# Patient Record
Sex: MALE | Race: BLACK OR AFRICAN AMERICAN | NOT HISPANIC OR LATINO | Employment: FULL TIME | URBAN - METROPOLITAN AREA
[De-identification: names, ages, dates, MRNs, and addresses within clinical notes are randomized per-mention and may not be internally consistent; named-entity substitution may affect disease eponyms.]

---

## 2017-04-18 ENCOUNTER — ALLSCRIPTS OFFICE VISIT (OUTPATIENT)
Dept: OTHER | Facility: OTHER | Age: 37
End: 2017-04-18

## 2017-04-18 DIAGNOSIS — I10 ESSENTIAL (PRIMARY) HYPERTENSION: ICD-10-CM

## 2017-04-18 DIAGNOSIS — E66.9 OBESITY: ICD-10-CM

## 2017-08-12 ENCOUNTER — APPOINTMENT (OUTPATIENT)
Dept: LAB | Facility: CLINIC | Age: 37
End: 2017-08-12
Payer: COMMERCIAL

## 2017-08-12 ENCOUNTER — TRANSCRIBE ORDERS (OUTPATIENT)
Dept: LAB | Facility: CLINIC | Age: 37
End: 2017-08-12

## 2017-08-12 DIAGNOSIS — E66.9 OBESITY: ICD-10-CM

## 2017-08-12 DIAGNOSIS — I10 ESSENTIAL (PRIMARY) HYPERTENSION: ICD-10-CM

## 2017-08-12 LAB
ALBUMIN SERPL BCP-MCNC: 3.5 G/DL (ref 3.5–5)
ALP SERPL-CCNC: 71 U/L (ref 46–116)
ALT SERPL W P-5'-P-CCNC: 37 U/L (ref 12–78)
ANION GAP SERPL CALCULATED.3IONS-SCNC: 7 MMOL/L (ref 4–13)
AST SERPL W P-5'-P-CCNC: 24 U/L (ref 5–45)
BILIRUB SERPL-MCNC: 0.6 MG/DL (ref 0.2–1)
BUN SERPL-MCNC: 15 MG/DL (ref 5–25)
CALCIUM SERPL-MCNC: 9 MG/DL (ref 8.3–10.1)
CHLORIDE SERPL-SCNC: 99 MMOL/L (ref 100–108)
CHOLEST SERPL-MCNC: 166 MG/DL (ref 50–200)
CO2 SERPL-SCNC: 31 MMOL/L (ref 21–32)
CREAT SERPL-MCNC: 1.36 MG/DL (ref 0.6–1.3)
EST. AVERAGE GLUCOSE BLD GHB EST-MCNC: 126 MG/DL
GFR SERPL CREATININE-BSD FRML MDRD: 76 ML/MIN/1.73SQ M
GLUCOSE P FAST SERPL-MCNC: 95 MG/DL (ref 65–99)
HBA1C MFR BLD: 6 % (ref 4.2–6.3)
HDLC SERPL-MCNC: 38 MG/DL (ref 40–60)
LDLC SERPL CALC-MCNC: 107 MG/DL (ref 0–100)
POTASSIUM SERPL-SCNC: 3.8 MMOL/L (ref 3.5–5.3)
PROT SERPL-MCNC: 8.1 G/DL (ref 6.4–8.2)
SODIUM SERPL-SCNC: 137 MMOL/L (ref 136–145)
TRIGL SERPL-MCNC: 107 MG/DL
TSH SERPL DL<=0.05 MIU/L-ACNC: 1.35 UIU/ML (ref 0.36–3.74)

## 2017-08-12 PROCEDURE — 84443 ASSAY THYROID STIM HORMONE: CPT

## 2017-08-12 PROCEDURE — 83036 HEMOGLOBIN GLYCOSYLATED A1C: CPT

## 2017-08-12 PROCEDURE — 80053 COMPREHEN METABOLIC PANEL: CPT

## 2017-08-12 PROCEDURE — 80061 LIPID PANEL: CPT

## 2017-08-12 PROCEDURE — 36415 COLL VENOUS BLD VENIPUNCTURE: CPT
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
## 2018-01-13 NOTE — RESULT NOTES
Verified Results  (1) COMPREHENSIVE METABOLIC PANEL [id number] 55:56RM [employer]     Test Name Result Flag Reference   GLUCOSE 87 mg/dL  65-99   Fasting reference interval   UREA NITROGEN (BUN) 13 mg/dL  7-25   CREATININE 1 14 mg/dL  0 60-1 35   eGFR NON-AFR   AMERICAN 82 mL/min/1 73m2  > OR = 60   eGFR AFRICAN AMERICAN 95 mL/min/1 73m2  > OR = 60   BUN/CREATININE RATIO   2-01   NOT APPLICABLE (calc)   SODIUM 139 mmol/L  135-146   POTASSIUM 4 0 mmol/L  3 5-5 3   CHLORIDE 102 mmol/L     CARBON DIOXIDE 30 mmol/L  19-30   CALCIUM 9 5 mg/dL  8 6-10 3   PROTEIN, TOTAL 7 6 g/dL  6 1-8 1   ALBUMIN 4 2 g/dL  3 6-5 1   GLOBULIN 3 4 g/dL (calc)  1 9-3 7   ALBUMIN/GLOBULIN RATIO 1 2 (calc)  1 0-2 5   BILIRUBIN, TOTAL 0 8 mg/dL  0 2-1 2   ALKALINE PHOSPHATASE 61 U/L     AST 17 U/L  10-40   ALT 17 U/L  9-46     (1) CBC/PLT/DIFF [id number] 10:44AM [employer]     Test Name Result Flag Reference   WHITE BLOOD CELL COUNT 7 7 Thousand/uL  3 8-10 8   RED BLOOD CELL COUNT 5 47 Million/uL  4 20-5 80   HEMOGLOBIN 14 9 g/dL  13 2-17 1   HEMATOCRIT 46 6 %  38 5-50 0   MCV 85 2 fL  80 0-100 0   MCH 27 3 pg  27 0-33 0   MCHC 32 0 g/dL  32 0-36 0   RDW 14 9 %  11 0-15 0   PLATELET COUNT 694 Thousand/uL  140-400   MPV 9 2 fL  7 5-11 5   ABSOLUTE NEUTROPHILS 4127 cells/uL  7833-6199   ABSOLUTE LYMPHOCYTES 3011 cells/uL  850-3900   ABSOLUTE MONOCYTES 424 cells/uL  200-950   ABSOLUTE EOSINOPHILS 100 cells/uL     ABSOLUTE BASOPHILS 39 cells/uL  0-200   NEUTROPHILS 53 6 %     LYMPHOCYTES 39 1 %     MONOCYTES 5 5 %     EOSINOPHILS 1 3 %     BASOPHILS 0 5 %       (Q) LIPID PANEL WITH REFLEX TO DIRECT LDL [id number] 10:44AM [employer]     Test Name Result Flag Reference   CHOLESTEROL, TOTAL 177 mg/dL  125-200   HDL CHOLESTEROL 43 mg/dL  > OR = 40   TRIGLICERIDES 205 mg/dL  <150   LDL-CHOLESTEROL 104 mg/dL (calc)  <130   Desirable range <100 mg/dL for patients with CHD or  diabetes and <70 mg/dL for diabetic patients with  known heart disease  CHOL/HDLC RATIO 4 1 (calc)  < OR = 5 0   NON HDL CHOLESTEROL 134 mg/dL (calc)     Target for non-HDL cholesterol is 30 mg/dL higher than   LDL cholesterol target       (Q) TSH, 3RD GENERATION W/REFLEX TO FT4 31RNY1367 10:44AM Monika Alex   REPORT COMMENT:  FASTING:YES     Test Name Result Flag Reference   TSH W/REFLEX TO FT4 1 50 mIU/L  0 40-4 50       Discussion/Summary   OVERALL OK      [facility]

## 2018-01-14 VITALS
DIASTOLIC BLOOD PRESSURE: 98 MMHG | RESPIRATION RATE: 18 BRPM | BODY MASS INDEX: 46.76 KG/M2 | WEIGHT: 315 LBS | HEART RATE: 72 BPM | SYSTOLIC BLOOD PRESSURE: 152 MMHG

## 2018-01-16 NOTE — RESULT NOTES
Verified Results  US ABDOMEN LIMITED [id number] 07:02AM Tyesha Kerrie Order Number: [id number]   Performing Comments: LUMP IN UPPER LEFT ABDOMEN- NEAR SCAR   - Patient Instructions: To schedule this appointment, please contact Central Scheduling at [phone]  Test Name Result Flag Reference   US ABDOMEN LIMITED (Report)     Left upper quadrant ULTRASOUND     INDICATION: Palpable abnormality in the left upper quadrant  COMPARISON: None  TECHNIQUE:  Real-time ultrasound of the left upper quadrant in the area of palpable abnormality was performed with a linear transducer with both volumetric sweeps and still imaging techniques  FINDINGS: Palpable abnormality corresponds to a subcutaneous echogenic structure measuring 1 mm with shadowing  IMPRESSION:     Palpable abnormality corresponds to a subcutaneous echogenic structure measuring 1 mm with shadowing  Nonspecific could represent a small foreign body versus dystrophic calcium  Clinical correlation recommended  Workstation performed: [id number]     Signed by: Magaly Jacob MD   2/15/16     * XR SPINE LUMBAR 2 OR 3 VIEWS [id number] 07:01AM Tyesha Cohens Order Number: [id number]     Test Name Result Flag Reference   XR SPINE LUMBAR 2 OR 3 VIEWS (Report)     LUMBAR SPINE     INDICATION: Left-sided low back pain     COMPARISON: None     VIEWS: AP, lateral and coned-down projections; 3 images     FINDINGS:     Alignment is unremarkable  There is no radiographic evidence of acute fracture or destructive osseous lesion  No significant lumbar degenerative change noted  Visualized soft tissues appear unremarkable  IMPRESSION:     No acute bony abnormality, lumbar spine         Workstation performed: [id number]     Signed by:   Burt Paulino MD   2/15/16

## 2018-02-02 DIAGNOSIS — K21.9 GASTROESOPHAGEAL REFLUX DISEASE WITHOUT ESOPHAGITIS: ICD-10-CM

## 2018-02-02 DIAGNOSIS — I10 ESSENTIAL HYPERTENSION: Primary | ICD-10-CM

## 2018-02-02 RX ORDER — LOSARTAN POTASSIUM AND HYDROCHLOROTHIAZIDE 25; 100 MG/1; MG/1
TABLET ORAL
Qty: 90 TABLET | Refills: 0 | Status: SHIPPED | OUTPATIENT
Start: 2018-02-02 | End: 2018-02-06 | Stop reason: SDUPTHER

## 2018-02-02 RX ORDER — OMEPRAZOLE 40 MG/1
CAPSULE, DELAYED RELEASE ORAL
Qty: 90 CAPSULE | Refills: 0 | Status: SHIPPED | OUTPATIENT
Start: 2018-02-02 | End: 2018-02-06 | Stop reason: SDUPTHER

## 2018-02-06 ENCOUNTER — OFFICE VISIT (OUTPATIENT)
Dept: URGENT CARE | Age: 38
End: 2018-02-06
Payer: COMMERCIAL

## 2018-02-06 VITALS
RESPIRATION RATE: 18 BRPM | BODY MASS INDEX: 42.66 KG/M2 | TEMPERATURE: 98.4 F | HEART RATE: 90 BPM | HEIGHT: 72 IN | DIASTOLIC BLOOD PRESSURE: 90 MMHG | WEIGHT: 315 LBS | SYSTOLIC BLOOD PRESSURE: 142 MMHG | OXYGEN SATURATION: 96 %

## 2018-02-06 DIAGNOSIS — J11.1 INFLUENZA-LIKE ILLNESS: Primary | ICD-10-CM

## 2018-02-06 PROCEDURE — 99203 OFFICE O/P NEW LOW 30 MIN: CPT | Performed by: FAMILY MEDICINE

## 2018-02-06 PROCEDURE — S9088 SERVICES PROVIDED IN URGENT: HCPCS | Performed by: FAMILY MEDICINE

## 2018-02-06 RX ORDER — OMEPRAZOLE 40 MG/1
1 CAPSULE, DELAYED RELEASE ORAL DAILY
COMMUNITY
Start: 2011-12-01 | End: 2018-04-24 | Stop reason: SDUPTHER

## 2018-02-06 RX ORDER — LOSARTAN POTASSIUM AND HYDROCHLOROTHIAZIDE 25; 100 MG/1; MG/1
1 TABLET ORAL DAILY
COMMUNITY
Start: 2012-09-20 | End: 2018-04-24 | Stop reason: SDUPTHER

## 2018-02-06 RX ORDER — OSELTAMIVIR PHOSPHATE 75 MG/1
75 CAPSULE ORAL EVERY 12 HOURS SCHEDULED
Qty: 10 CAPSULE | Refills: 0 | Status: SHIPPED | OUTPATIENT
Start: 2018-02-06 | End: 2018-02-11

## 2018-02-06 NOTE — LETTER
February 6, 2018     Patient: Diaz Champagne   YOB: 1980   Date of Visit: 2/6/2018       To Whom It May Concern:    Patient seen in office today for acute illness  Please excuse from work until flu like symptoms have resolved           Sincerely,        Siomara Jung PA-C    CC: No Recipients

## 2018-02-07 NOTE — PROGRESS NOTES
[facility]        NAME: Jennifer Weathers is a 44 y o  male  : 1980    MRN: [id number]  DATE: 2018  TIME: 8:01 PM    Assessment and Plan   Influenza-like illness [R69]  1  Influenza-like illness  oseltamivir (TAMIFLU) 75 mg capsule         Patient Instructions     Patient Instructions     Influenza, Ambulatory Care   GENERAL INFORMATION:   Influenza (the flu) is an infection caused by the influenza virus  The flu is easily spread when an infected person coughs, sneezes, or has close contact with others  You may be able to spread the flu to others for 1 week or longer after signs or symptoms appear  Common symptoms include the following:   · Fever and chills    · Headaches, body aches, and muscle or joint pain    · Cough, runny nose, and sore throat    · Loss of appetite, nausea, vomiting, or diarrhea    · Tiredness    · Trouble breathing  Seek immediate care for the following symptoms:   · Dizziness    · Urinating less or not at all    · A seizure    · A headache, stiff neck, and confusion    · Trouble breathing with blue or purple lips    · New pain or pressure in your chest  Treatment for influenza  may include any of the following:  · Acetaminophen  decreases pain and fever  It is available without a doctor's order  Ask your healthcare provider how much to take and how often to take it  Follow directions  Acetaminophen can cause liver damage if not taken correctly  · NSAIDs  help decrease swelling and pain or fever  This medicine is available with or without a doctor's order  NSAIDs can cause stomach bleeding or kidney problems in certain people  If you take blood thinner medicine, always ask your healthcare provider if NSAIDs are safe for you  Always read the medicine label and follow directions  · Antivirals  are given to fight an infection caused by a virus  Manage your symptoms:   · Get more rest and sleep  to help you get better faster when you have the flu       · Drink more liquids as directed  Ask your healthcare provider how much liquid to drink each day and which liquids are best for you  Drinking liquids helps prevent dehydration  Prevent the spread of influenza:   · Wash your hands often  Use soap and water  Use gel hand cleanser when there is no soap and water available  Do not touch your eyes, nose, or mouth unless you have washed your hands first            · Cover your mouth and nose with a tissue when you sneeze or cough  Throw the tissue in the trash right away  · Clean shared items  Clean table surfaces, doorknobs, and light switches with a germ-killing   Do not share towels, silverware, or dishes with people who are sick  Wash bed sheets, towels, silverware, and dishes with soap and water  · Wear a face mask  over your mouth and nose if you have the flu or are near anyone who has the flu  Ask where to buy single-use masks  · Stay home if you are sick  Stay away from others as much as possible while you recover  · Get an influenza vaccine  to help prevent the flu  Everyone older than age 7 months should get a yearly influenza vaccine  Get the vaccine as soon as it is available, usually in October or November each year  Follow up with your healthcare provider as directed:  Write down your questions so you remember to ask them during your visits  CARE AGREEMENT:   You have the right to help plan your care  Learn about your health condition and how it may be treated  Discuss treatment options with your caregivers to decide what care you want to receive  You always have the right to refuse treatment  The above information is an  only  It is not intended as medical advice for individual conditions or treatments  Talk to your doctor, nurse or pharmacist before following any medical regimen to see if it is safe and effective for you    © 2014 [address] [employer] is for End User's use only and may not be sold, redistributed or otherwise used for commercial purposes  All illustrations and images included in CareNotes® are the copyrighted property of A D A M [employer] Inc  or [employer]  Chief Complaint     Chief Complaint   Patient presents with    Cold Like Symptoms     Yesterday - sore throat, congested cough, body aches and sl  nasal congestion  No Flu vaccine    Cough    Sore Throat         History of Present Illness   Dudley Duffy presents to the clinic c/o    39-year-old male that started feeling poorly yesterday  Sore throat, postnasal drainage and cough with slight nasal congestion  Body aches and pains across his shoulders and in his low back  No nausea vomiting or diarrhea  Took some Motrin this afternoon without much relief  Denies chest pain or shortness of breath at this time  Did not have flu vaccine this year  No history of asthma  Review of Systems   Review of Systems   Constitutional: Positive for activity change, appetite change and fatigue  Negative for chills and fever  HENT: Positive for rhinorrhea and sore throat  Negative for congestion, ear discharge, ear pain and postnasal drip  Eyes: Negative  Respiratory: Positive for cough  Negative for chest tightness, shortness of breath and wheezing  Cardiovascular: Negative  Gastrointestinal: Negative  Musculoskeletal: Positive for arthralgias and myalgias  Skin: Negative for rash  Neurological: Negative for dizziness  Hematological: Negative for adenopathy           Current Medications     Long-Term Prescriptions   Medication Sig Dispense Refill    losartan-hydrochlorothiazide (HYZAAR) 100-25 MG per tablet Take 1 tablet by mouth daily      omeprazole (PriLOSEC) 40 MG capsule Take 1 capsule by mouth daily      [DISCONTINUED] losartan-hydrochlorothiazide (HYZAAR) 100-25 MG per tablet TAKE 1 TABLET BY MOUTH EVERY MORNING 90 tablet 0    [DISCONTINUED] omeprazole (PriLOSEC) 40 MG capsule TAKE ONE CAPSULE BY MOUTH DAILY 90 capsule 0       Current Allergies     Allergies as of 02/06/2018 - Reviewed 02/06/2018   Allergen Reaction Noted    Ace inhibitors Cough 09/20/2012            The following portions of the patient's history were reviewed and updated as appropriate: allergies, current medications, past family history, past medical history, past social history, past surgical history and problem list     Objective   /90 (BP Location: Right arm, Patient Position: Sitting, Cuff Size: Large)   Pulse 90   Temp 98 4 °F (36 9 °C) (Oral)   Resp 18   Ht 6' (1 829 m)   Wt (!) 151 kg (333 lb 9 6 oz)   SpO2 96%   BMI 45 24 kg/m²        Physical Exam     Physical Exam   Constitutional: He is oriented to person, place, and time  He appears well-developed and well-nourished  No distress  HENT:   Right Ear: External ear normal    Left Ear: External ear normal    Mouth/Throat: No oropharyngeal exudate  Mild redness posterior pharynx without exudate or fetid breath   Eyes: Conjunctivae are normal  Pupils are equal, round, and reactive to light  Right eye exhibits no discharge  Left eye exhibits no discharge  No scleral icterus  Neck: Normal range of motion  Neck supple  Cardiovascular: Normal rate, regular rhythm and normal heart sounds  Exam reveals no gallop and no friction rub  No murmur heard  Pulmonary/Chest: Effort normal and breath sounds normal  No respiratory distress  He has no wheezes  He has no rales  Lymphadenopathy:     He has no cervical adenopathy  Neurological: He is alert and oriented to person, place, and time  Skin: Skin is warm and dry  No rash noted  Psychiatric: He has a normal mood and affect

## 2018-02-07 NOTE — PATIENT INSTRUCTIONS
Influenza, Ambulatory Care   GENERAL INFORMATION:   Influenza (the flu) is an infection caused by the influenza virus  The flu is easily spread when an infected person coughs, sneezes, or has close contact with others  You may be able to spread the flu to others for 1 week or longer after signs or symptoms appear  Common symptoms include the following:   · Fever and chills    · Headaches, body aches, and muscle or joint pain    · Cough, runny nose, and sore throat    · Loss of appetite, nausea, vomiting, or diarrhea    · Tiredness    · Trouble breathing  Seek immediate care for the following symptoms:   · Dizziness    · Urinating less or not at all    · A seizure    · A headache, stiff neck, and confusion    · Trouble breathing with blue or purple lips    · New pain or pressure in your chest  Treatment for influenza  may include any of the following:  · Acetaminophen  decreases pain and fever  It is available without a doctor's order  Ask your healthcare provider how much to take and how often to take it  Follow directions  Acetaminophen can cause liver damage if not taken correctly  · NSAIDs  help decrease swelling and pain or fever  This medicine is available with or without a doctor's order  NSAIDs can cause stomach bleeding or kidney problems in certain people  If you take blood thinner medicine, always ask your healthcare provider if NSAIDs are safe for you  Always read the medicine label and follow directions  · Antivirals  are given to fight an infection caused by a virus  Manage your symptoms:   · Get more rest and sleep  to help you get better faster when you have the flu  · Drink more liquids as directed  Ask your healthcare provider how much liquid to drink each day and which liquids are best for you  Drinking liquids helps prevent dehydration  Prevent the spread of influenza:   · Wash your hands often  Use soap and water  Use gel hand cleanser when there is no soap and water available   Do not touch your eyes, nose, or mouth unless you have washed your hands first            · Cover your mouth and nose with a tissue when you sneeze or cough  Throw the tissue in the trash right away  · Clean shared items  Clean table surfaces, doorknobs, and light switches with a germ-killing   Do not share towels, silverware, or dishes with people who are sick  Wash bed sheets, towels, silverware, and dishes with soap and water  · Wear a face mask  over your mouth and nose if you have the flu or are near anyone who has the flu  Ask where to buy single-use masks  · Stay home if you are sick  Stay away from others as much as possible while you recover  · Get an influenza vaccine  to help prevent the flu  Everyone older than age 7 months should get a yearly influenza vaccine  Get the vaccine as soon as it is available, usually in October or November each year  Follow up with your healthcare provider as directed:  Write down your questions so you remember to ask them during your visits  CARE AGREEMENT:   You have the right to help plan your care  Learn about your health condition and how it may be treated  Discuss treatment options with your caregivers to decide what care you want to receive  You always have the right to refuse treatment  The above information is an  only  It is not intended as medical advice for individual conditions or treatments  Talk to your doctor, nurse or pharmacist before following any medical regimen to see if it is safe and effective for you  © 2014 [address] is for End User's use only and may not be sold, redistributed or otherwise used for commercial purposes  All illustrations and images included in CareNotes® are the copyrighted property of [employer]  or [employer]

## 2018-04-24 DIAGNOSIS — I10 ESSENTIAL HYPERTENSION: Primary | ICD-10-CM

## 2018-04-24 DIAGNOSIS — K21.9 GERD WITHOUT ESOPHAGITIS: ICD-10-CM

## 2018-04-24 RX ORDER — LOSARTAN POTASSIUM AND HYDROCHLOROTHIAZIDE 25; 100 MG/1; MG/1
TABLET ORAL
Qty: 90 TABLET | Refills: 0 | Status: SHIPPED | OUTPATIENT
Start: 2018-04-24 | End: 2018-05-01 | Stop reason: SDUPTHER

## 2018-04-24 RX ORDER — OMEPRAZOLE 40 MG/1
CAPSULE, DELAYED RELEASE ORAL
Qty: 90 CAPSULE | Refills: 0 | Status: SHIPPED | OUTPATIENT
Start: 2018-04-24 | End: 2018-05-01 | Stop reason: SDUPTHER

## 2018-05-01 ENCOUNTER — OFFICE VISIT (OUTPATIENT)
Dept: FAMILY MEDICINE CLINIC | Facility: CLINIC | Age: 38
End: 2018-05-01
Payer: COMMERCIAL

## 2018-05-01 VITALS
WEIGHT: 315 LBS | HEART RATE: 84 BPM | HEIGHT: 71 IN | BODY MASS INDEX: 44.1 KG/M2 | SYSTOLIC BLOOD PRESSURE: 144 MMHG | DIASTOLIC BLOOD PRESSURE: 90 MMHG | RESPIRATION RATE: 16 BRPM

## 2018-05-01 DIAGNOSIS — E66.01 CLASS 3 SEVERE OBESITY DUE TO EXCESS CALORIES WITHOUT SERIOUS COMORBIDITY WITH BODY MASS INDEX (BMI) OF 45.0 TO 49.9 IN ADULT (HCC): ICD-10-CM

## 2018-05-01 DIAGNOSIS — G47.33 OBSTRUCTIVE SLEEP APNEA: ICD-10-CM

## 2018-05-01 DIAGNOSIS — Z00.00 WELL ADULT EXAM: Primary | ICD-10-CM

## 2018-05-01 DIAGNOSIS — I10 ESSENTIAL HYPERTENSION: ICD-10-CM

## 2018-05-01 DIAGNOSIS — Z13.1 SCREENING FOR DIABETES MELLITUS: ICD-10-CM

## 2018-05-01 DIAGNOSIS — K21.9 GERD WITHOUT ESOPHAGITIS: ICD-10-CM

## 2018-05-01 PROCEDURE — 99395 PREV VISIT EST AGE 18-39: CPT | Performed by: FAMILY MEDICINE

## 2018-05-01 RX ORDER — OMEPRAZOLE 40 MG/1
40 CAPSULE, DELAYED RELEASE ORAL DAILY
Qty: 90 CAPSULE | Refills: 3 | Status: SHIPPED | OUTPATIENT
Start: 2018-05-01 | End: 2019-08-08 | Stop reason: SDUPTHER

## 2018-05-01 RX ORDER — LOSARTAN POTASSIUM AND HYDROCHLOROTHIAZIDE 25; 100 MG/1; MG/1
1 TABLET ORAL EVERY MORNING
Qty: 90 TABLET | Refills: 3 | Status: SHIPPED | OUTPATIENT
Start: 2018-05-01 | End: 2019-08-08 | Stop reason: SDUPTHER

## 2018-05-01 RX ORDER — AMLODIPINE BESYLATE 5 MG/1
5 TABLET ORAL DAILY
Qty: 90 TABLET | Refills: 3 | Status: SHIPPED | OUTPATIENT
Start: 2018-05-01 | End: 2018-11-01

## 2018-05-01 NOTE — PROGRESS NOTES
FAMILY PRACTICE HEALTH MAINTENANCE OFFICE VISIT  [facility] - [facility]    NAME: Kristi Pedroza  AGE: 44 y o  SEX: male  : 1980     DATE: 2018    Assessment and Plan     Problem List Items Addressed This Visit     GERD without esophagitis    Relevant Medications    omeprazole (PriLOSEC) 40 MG capsule    Hypertension    Relevant Medications    amLODIPine (NORVASC) 5 mg tablet    losartan-hydrochlorothiazide (HYZAAR) 100-25 MG per tablet    Other Relevant Orders    Comprehensive metabolic panel    Lipid Panel with Direct LDL reflex    TSH, 3rd generation with T4 reflex    Obesity     Diet and exercise discussed         Obstructive sleep apnea     Using cpap         Well adult exam - Primary      Other Visit Diagnoses     Screening for diabetes mellitus        Relevant Orders    HEMOGLOBIN A1C W/ EAG ESTIMATION            · Patient Counseling:   · Nutrition: Stressed importance of a well balanced diet, moderation of sodium/saturated fat, caloric balance and sufficient intake of fiber  · Exercise: Stressed the importance of regular exercise with a goal of 150 minutes per week  · Dental Health: Discussed daily flossing and brushing and regular dental visits   · Alcohol Use:  Recommended moderation of alcohol intake  · Injury Prevention: Discussed Safety Belts, Safety Helmets, and Smoke Detectors    · Immunizations reviewed  Up to date  · Discussed benefits of screening screening up to date  · Discussed the patient's BMI with him  The BMI is above average; no BMI management plan is appropriate     Return in 1 year (on 2019)          Chief Complaint     Chief Complaint   Patient presents with    Well Check     Patient here for Annual wellness exam        History of Present Illness     Here for wellness        Well Adult Physical   Patient here for a comprehensive physical exam       Diet and Physical Activity  Diet: poor diet  Weight concerns: Patient has class 3 obesity (BMI >40)  Exercise: never      Depression Screen  PHQ-9 Depression Screening    PHQ-9:    Frequency of the following problems over the past two weeks:               General Health  Hearing: Normal:  bilateral  Vision: wears glasses and contacts  Dental: regular dental visits and brushes teeth twice daily    Reproductive Health  Up to date      The following portions of the patient's history were reviewed and updated as appropriate: allergies, current medications, past family history, past medical history, past social history, past surgical history and problem list     Review of Systems     Review of Systems   Constitutional: Negative  HENT: Negative  Eyes: Negative  Respiratory: Negative  Cardiovascular: Negative  Gastrointestinal: Negative  Endocrine: Negative  Genitourinary: Negative  Musculoskeletal: Negative  Skin: Negative  Allergic/Immunologic: Negative  Neurological: Positive for headaches  Hematological: Negative  Psychiatric/Behavioral: Negative          Past Medical History     Past Medical History:   Diagnosis Date    Chronic low back pain     Last Assessed: 2/10/2016     GERD (gastroesophageal reflux disease)     Hypertension     Lump of skin     Last Assessed: 2/10/2016     Obesity 5/22/2013    Somatic dysfunction of lumbar region     Last Assessed: 2/18/2016     Whiplash injury to neck     Last Assessed: 2/18/2016        Past Surgical History     Past Surgical History:   Procedure Laterality Date    APPENDECTOMY      REDUCTION OF TORSION OF TESTIS  1994    SURGERY SCROTAL / TESTICULAR         Social History     Social History     Social History    Marital status: /Civil Union     Spouse name: N/A    Number of children: N/A    Years of education: N/A     Social History Main Topics    Smoking status: Current Some Day Smoker     Types: Cigars    Smokeless tobacco: Never Used      Comment: 1 cigar weekly/ Per allscripts: occasional tobacco smoker     Alcohol use No      Comment: Per allscripts: Social alcohol use     Drug use: No    Sexual activity: Not on file     Other Topics Concern    Not on file     Social History Narrative    No narrative on file       Family History     Family History   Problem Relation Age of Onset   Vena Fabiola Glaucoma Mother     Hypertension Mother     Hypertension Father        Current Medications       Current Outpatient Prescriptions:     losartan-hydrochlorothiazide (HYZAAR) 100-25 MG per tablet, Take 1 tablet by mouth every morning, Disp: 90 tablet, Rfl: 3    omeprazole (PriLOSEC) 40 MG capsule, Take 1 capsule (40 mg total) by mouth daily, Disp: 90 capsule, Rfl: 3    amLODIPine (NORVASC) 5 mg tablet, Take 1 tablet (5 mg total) by mouth daily, Disp: 90 tablet, Rfl: 3     Allergies     Allergies   Allergen Reactions    Ace Inhibitors Cough       Objective     /90   Pulse 84   Resp 16   Ht 5' 10 83" (1 799 m)   Wt (!) 150 kg (331 lb 9 6 oz)   BMI 46 48 kg/m²      Physical Exam   Constitutional: He is oriented to person, place, and time  Vital signs are normal  He appears well-developed and well-nourished  HENT:   Head: Normocephalic and atraumatic  Right Ear: External ear normal    Left Ear: External ear normal    Nose: Nose normal    Mouth/Throat: Oropharynx is clear and moist    Eyes: Conjunctivae, EOM and lids are normal  Pupils are equal, round, and reactive to light  Neck: Normal range of motion  Neck supple  Cardiovascular: Normal rate, regular rhythm, S1 normal, S2 normal, normal heart sounds, intact distal pulses and normal pulses  Pulmonary/Chest: Effort normal and breath sounds normal    Abdominal: Soft  Normal appearance and bowel sounds are normal    Musculoskeletal: Normal range of motion  Neurological: He is alert and oriented to person, place, and time  He has normal strength and normal reflexes  Skin: Skin is warm and dry  Psychiatric: He has a normal mood and affect   His speech is normal and behavior is normal  Judgment and thought content normal  Cognition and memory are normal    Nursing note and vitals reviewed          No exam data present    Health Maintenance     Health Maintenance   Topic Date Due    HIV SCREENING  1980    PNEUMOCOCCAL POLYSACCHARIDE VACCINE AGE 2-64 HIGH RISK  07/06/1981    INFLUENZA VACCINE  09/01/2018    Depression Screening PHQ-9  02/06/2019    DTaP,Tdap,and Td Vaccines (2 - Td) 09/20/2022     Immunization History   Administered Date(s) Administered    Influenza Quadrivalent Preservative Free 3 years and older IM 09/22/2014    Influenza Quadrivalent, 6-35 Months IM 09/25/2015    Influenza TIV (IM) 01/21/2013    Tdap 09/20/2012       DO [facility]

## 2018-05-02 ENCOUNTER — TRANSCRIBE ORDERS (OUTPATIENT)
Dept: LAB | Facility: CLINIC | Age: 38
End: 2018-05-02

## 2018-05-02 ENCOUNTER — APPOINTMENT (OUTPATIENT)
Dept: LAB | Facility: CLINIC | Age: 38
End: 2018-05-02
Payer: COMMERCIAL

## 2018-05-02 DIAGNOSIS — I10 ESSENTIAL HYPERTENSION: ICD-10-CM

## 2018-05-02 DIAGNOSIS — Z13.1 SCREENING FOR DIABETES MELLITUS: ICD-10-CM

## 2018-05-02 LAB
ALBUMIN SERPL BCP-MCNC: 3.6 G/DL (ref 3.5–5)
ALP SERPL-CCNC: 69 U/L (ref 46–116)
ALT SERPL W P-5'-P-CCNC: 38 U/L (ref 12–78)
ANION GAP SERPL CALCULATED.3IONS-SCNC: 7 MMOL/L (ref 4–13)
AST SERPL W P-5'-P-CCNC: 19 U/L (ref 5–45)
BILIRUB SERPL-MCNC: 0.5 MG/DL (ref 0.2–1)
BUN SERPL-MCNC: 12 MG/DL (ref 5–25)
CALCIUM SERPL-MCNC: 9.1 MG/DL (ref 8.3–10.1)
CHLORIDE SERPL-SCNC: 100 MMOL/L (ref 100–108)
CHOLEST SERPL-MCNC: 184 MG/DL (ref 50–200)
CO2 SERPL-SCNC: 30 MMOL/L (ref 21–32)
CREAT SERPL-MCNC: 1.32 MG/DL (ref 0.6–1.3)
EST. AVERAGE GLUCOSE BLD GHB EST-MCNC: 123 MG/DL
GFR SERPL CREATININE-BSD FRML MDRD: 79 ML/MIN/1.73SQ M
GLUCOSE P FAST SERPL-MCNC: 100 MG/DL (ref 65–99)
HBA1C MFR BLD: 5.9 % (ref 4.2–6.3)
HDLC SERPL-MCNC: 34 MG/DL (ref 40–60)
LDLC SERPL CALC-MCNC: 122 MG/DL (ref 0–100)
POTASSIUM SERPL-SCNC: 3.5 MMOL/L (ref 3.5–5.3)
PROT SERPL-MCNC: 8.1 G/DL (ref 6.4–8.2)
SODIUM SERPL-SCNC: 137 MMOL/L (ref 136–145)
TRIGL SERPL-MCNC: 139 MG/DL
TSH SERPL DL<=0.05 MIU/L-ACNC: 1.61 UIU/ML (ref 0.36–3.74)

## 2018-05-02 PROCEDURE — 80053 COMPREHEN METABOLIC PANEL: CPT

## 2018-05-02 PROCEDURE — 83036 HEMOGLOBIN GLYCOSYLATED A1C: CPT

## 2018-05-02 PROCEDURE — 36415 COLL VENOUS BLD VENIPUNCTURE: CPT

## 2018-05-02 PROCEDURE — 84443 ASSAY THYROID STIM HORMONE: CPT

## 2018-05-02 PROCEDURE — 80061 LIPID PANEL: CPT

## 2018-11-01 ENCOUNTER — OFFICE VISIT (OUTPATIENT)
Dept: FAMILY MEDICINE CLINIC | Facility: CLINIC | Age: 38
End: 2018-11-01
Payer: COMMERCIAL

## 2018-11-01 VITALS
DIASTOLIC BLOOD PRESSURE: 90 MMHG | TEMPERATURE: 97.3 F | RESPIRATION RATE: 20 BRPM | OXYGEN SATURATION: 96 % | HEART RATE: 81 BPM | WEIGHT: 310.6 LBS | SYSTOLIC BLOOD PRESSURE: 126 MMHG | BODY MASS INDEX: 43.48 KG/M2 | HEIGHT: 71 IN

## 2018-11-01 DIAGNOSIS — Z23 NEED FOR INFLUENZA VACCINATION: ICD-10-CM

## 2018-11-01 DIAGNOSIS — R73.01 IFG (IMPAIRED FASTING GLUCOSE): ICD-10-CM

## 2018-11-01 DIAGNOSIS — E66.01 CLASS 3 SEVERE OBESITY DUE TO EXCESS CALORIES WITH SERIOUS COMORBIDITY AND BODY MASS INDEX (BMI) OF 40.0 TO 44.9 IN ADULT (HCC): ICD-10-CM

## 2018-11-01 DIAGNOSIS — I10 ESSENTIAL HYPERTENSION: Primary | ICD-10-CM

## 2018-11-01 DIAGNOSIS — K21.9 GERD WITHOUT ESOPHAGITIS: ICD-10-CM

## 2018-11-01 PROCEDURE — 90471 IMMUNIZATION ADMIN: CPT

## 2018-11-01 PROCEDURE — 3008F BODY MASS INDEX DOCD: CPT | Performed by: FAMILY MEDICINE

## 2018-11-01 PROCEDURE — 90686 IIV4 VACC NO PRSV 0.5 ML IM: CPT

## 2018-11-01 PROCEDURE — 99214 OFFICE O/P EST MOD 30 MIN: CPT | Performed by: FAMILY MEDICINE

## 2018-11-01 RX ORDER — MULTIVIT-MIN/IRON FUM/FOLIC AC 7.5 MG-4
1 TABLET ORAL DAILY
COMMUNITY
End: 2020-06-09 | Stop reason: HOSPADM

## 2018-11-01 NOTE — PROGRESS NOTES
Assessment/Plan:    Problem List Items Addressed This Visit     GERD without esophagitis     Improved slightly with weight loss         Hypertension - Primary     Stable on hyzaar         Relevant Orders    Comprehensive metabolic panel    Lipid Panel with Direct LDL reflex    TSH, 3rd generation with Free T4 reflex    Class 3 severe obesity due to excess calories with serious comorbidity and body mass index (BMI) of 40 0 to 44 9 in adult (HCC)     Started diet plan  Minimal exercise  Lost 50 pounds  Started weight loss program with chiropractor         IFG (impaired fasting glucose)     Check labs         Relevant Orders    Hemoglobin A1C      Other Visit Diagnoses     Need for influenza vaccination        Relevant Orders    SYRINGE/SINGLE-DOSE VIAL: influenza vaccine, [id number], quadrivalent, 0 5 mL, preservative-free, for patients 3+ yr (FLUZONE)          There are no Patient Instructions on file for this visit  No Follow-up on file  Subjective:      Patient ID: Evelia Graham is a 43 y o  male  Chief Complaint   Patient presents with    Follow-up     Patient here for 6 month follow up on Hypertension, Obesity        Here for follow up  Started with weigh loss program  Lost total 50 pounds      Hypertension   This is a chronic problem  The current episode started more than 1 year ago  The problem is unchanged  The problem is controlled  Pertinent negatives include no anxiety, blurred vision, chest pain, headaches, malaise/fatigue, neck pain, orthopnea, palpitations, peripheral edema, PND, shortness of breath or sweats  There are no associated agents to hypertension  Risk factors for coronary artery disease include obesity and male gender  Past treatments include angiotensin blockers  The current treatment provides moderate improvement  There are no compliance problems          The following portions of the patient's history were reviewed and updated as appropriate: allergies, current medications, past family history, past medical history, past social history, past surgical history and problem list     Review of Systems   Constitutional: Negative  Negative for malaise/fatigue  HENT: Negative  Eyes: Negative  Negative for blurred vision  Respiratory: Negative for shortness of breath  Cardiovascular: Negative for chest pain, palpitations, orthopnea and PND  Gastrointestinal: Negative  Endocrine: Negative  Genitourinary: Negative  Musculoskeletal: Negative for neck pain  Skin: Negative  Allergic/Immunologic: Negative  Neurological: Negative for headaches  Hematological: Negative  Psychiatric/Behavioral: Negative  Current Outpatient Prescriptions   Medication Sig Dispense Refill    losartan-hydrochlorothiazide (HYZAAR) 100-25 MG per tablet Take 1 tablet by mouth every morning 90 tablet 3    Multiple Vitamins-Minerals (MULTIVITAMIN WITH MINERALS) tablet Take 1 tablet by mouth daily      omeprazole (PriLOSEC) 40 MG capsule Take 1 capsule (40 mg total) by mouth daily 90 capsule 3     No current facility-administered medications for this visit  Objective:    /90   Pulse 81   Temp (!) 97 3 °F (36 3 °C)   Resp 20   Ht 5' 10 83" (1 799 m)   Wt (!) 141 kg (310 lb 9 6 oz)   SpO2 96%   BMI 43 53 kg/m²        Physical Exam   Constitutional: He appears well-developed and well-nourished  HENT:   Head: Normocephalic and atraumatic  Eyes: Pupils are equal, round, and reactive to light  EOM are normal    Neck: Normal range of motion  Neck supple  Cardiovascular: Normal rate, regular rhythm, normal heart sounds and intact distal pulses  Pulmonary/Chest: Effort normal and breath sounds normal    Abdominal: Soft  Bowel sounds are normal    Musculoskeletal: He exhibits tenderness (left post scapular area)  Neurological: He is alert  Skin: Skin is warm and dry  Psychiatric: He has a normal mood and affect   His behavior is normal  Judgment and thought content normal    Nursing note and vitals reviewed               Hoa Brooke DO

## 2019-04-01 ENCOUNTER — TELEPHONE (OUTPATIENT)
Dept: SLEEP CENTER | Facility: CLINIC | Age: 39
End: 2019-04-01

## 2019-04-01 ENCOUNTER — OFFICE VISIT (OUTPATIENT)
Dept: SLEEP CENTER | Facility: CLINIC | Age: 39
End: 2019-04-01
Payer: COMMERCIAL

## 2019-04-01 VITALS
WEIGHT: 315 LBS | SYSTOLIC BLOOD PRESSURE: 118 MMHG | BODY MASS INDEX: 44.1 KG/M2 | DIASTOLIC BLOOD PRESSURE: 86 MMHG | HEIGHT: 71 IN

## 2019-04-01 DIAGNOSIS — K21.9 GERD WITHOUT ESOPHAGITIS: ICD-10-CM

## 2019-04-01 DIAGNOSIS — I10 ESSENTIAL HYPERTENSION: ICD-10-CM

## 2019-04-01 DIAGNOSIS — E66.01 MORBID OBESITY WITH BMI OF 40.0-44.9, ADULT (HCC): ICD-10-CM

## 2019-04-01 DIAGNOSIS — G47.33 OBSTRUCTIVE SLEEP APNEA: Primary | ICD-10-CM

## 2019-04-01 PROCEDURE — 99204 OFFICE O/P NEW MOD 45 MIN: CPT | Performed by: INTERNAL MEDICINE

## 2019-08-08 DIAGNOSIS — I10 ESSENTIAL HYPERTENSION: ICD-10-CM

## 2019-08-08 DIAGNOSIS — K21.9 GERD WITHOUT ESOPHAGITIS: ICD-10-CM

## 2019-08-08 RX ORDER — OMEPRAZOLE 40 MG/1
CAPSULE, DELAYED RELEASE ORAL
Qty: 90 CAPSULE | Refills: 3 | Status: SHIPPED | OUTPATIENT
Start: 2019-08-08 | End: 2020-03-11 | Stop reason: SDUPTHER

## 2019-08-08 RX ORDER — LOSARTAN POTASSIUM AND HYDROCHLOROTHIAZIDE 25; 100 MG/1; MG/1
TABLET ORAL
Qty: 90 TABLET | Refills: 3 | Status: SHIPPED | OUTPATIENT
Start: 2019-08-08 | End: 2020-03-11 | Stop reason: SDUPTHER

## 2019-10-22 ENCOUNTER — TELEPHONE (OUTPATIENT)
Dept: FAMILY MEDICINE CLINIC | Facility: CLINIC | Age: 39
End: 2019-10-22

## 2019-10-22 NOTE — TELEPHONE ENCOUNTER
Patient called to schedule his physical that was cancelled in May  He is scheduled for 1/23/20, however, work needs him to have it done by 11/15/19  Is there anywhere he can be placed? Please advise

## 2019-10-24 ENCOUNTER — OFFICE VISIT (OUTPATIENT)
Dept: FAMILY MEDICINE CLINIC | Facility: CLINIC | Age: 39
End: 2019-10-24
Payer: COMMERCIAL

## 2019-10-24 VITALS
SYSTOLIC BLOOD PRESSURE: 126 MMHG | DIASTOLIC BLOOD PRESSURE: 80 MMHG | OXYGEN SATURATION: 96 % | HEIGHT: 71 IN | HEART RATE: 70 BPM | WEIGHT: 315 LBS | BODY MASS INDEX: 44.1 KG/M2 | RESPIRATION RATE: 18 BRPM

## 2019-10-24 DIAGNOSIS — D22.9 ATYPICAL NEVI: ICD-10-CM

## 2019-10-24 DIAGNOSIS — E66.01 CLASS 3 SEVERE OBESITY DUE TO EXCESS CALORIES WITH SERIOUS COMORBIDITY AND BODY MASS INDEX (BMI) OF 40.0 TO 44.9 IN ADULT (HCC): ICD-10-CM

## 2019-10-24 DIAGNOSIS — Z00.00 ANNUAL PHYSICAL EXAM: Primary | ICD-10-CM

## 2019-10-24 DIAGNOSIS — Z23 ENCOUNTER FOR IMMUNIZATION: ICD-10-CM

## 2019-10-24 DIAGNOSIS — R73.01 IFG (IMPAIRED FASTING GLUCOSE): ICD-10-CM

## 2019-10-24 DIAGNOSIS — I10 ESSENTIAL HYPERTENSION: ICD-10-CM

## 2019-10-24 PROCEDURE — 99396 PREV VISIT EST AGE 40-64: CPT | Performed by: FAMILY MEDICINE

## 2019-10-24 PROCEDURE — 90471 IMMUNIZATION ADMIN: CPT

## 2019-10-24 PROCEDURE — 90686 IIV4 VACC NO PRSV 0.5 ML IM: CPT

## 2019-10-24 NOTE — PATIENT INSTRUCTIONS

## 2019-10-24 NOTE — PROGRESS NOTES
[address]    NAME: Remigio Johansen  AGE: 35 y o  SEX: male  : 1980     DATE: 10/24/2019     Assessment and Plan:     Problem List Items Addressed This Visit        Endocrine    IFG (impaired fasting glucose)    Relevant Orders    Hemoglobin A1C       Cardiovascular and Mediastinum    Hypertension    Relevant Orders    CBC    Comprehensive metabolic panel    Lipid Panel with Direct LDL reflex    TSH, 3rd generation with Free T4 reflex       Other    Class 3 severe obesity due to excess calories with serious comorbidity and body mass index (BMI) of 40 0 to 44 9 in adult [facility])     Back to exercise         Annual physical exam - Primary     Flu shot today         Encounter for immunization    Relevant Orders    influenza vaccine, [id number], quadrivalent, 0 5 mL, preservative-free, for adult and pediatric patients 6 mos+ (AFLURIA, FLUARIX, FLULAVAL, FLUZONE)      Other Visit Diagnoses     Atypical nevi        Relevant Orders    Ambulatory referral to Dermatology        BMI Counseling: Body mass index is 45 64 kg/m²  The BMI is above normal  Nutrition recommendations include reducing portion sizes and 3-5 servings of fruits/vegetables daily  Exercise recommendations include exercising 3-5 times per week  Immunizations and preventive care screenings were discussed with patient today  Appropriate education was printed on patient's after visit summary  Counseling:  · Dental Health: discussed importance of regular tooth brushing, flossing, and dental visits  BMI Counseling: Body mass index is 45 64 kg/m²  The BMI is above normal  Nutrition recommendations include decreasing portion sizes and encouraging healthy choices of fruits and vegetables  Exercise recommendations include exercising 3-5 times per week  No pharmacotherapy was ordered  Return in 1 year (on 10/24/2020)       Chief Complaint:     Chief Complaint   Patient presents with    Physical Exam     Annual Physical Exam      History of Present Illness:     Adult Annual Physical   Patient here for a comprehensive physical exam  The patient reports no problems  Diet and Physical Activity  · Diet/Nutrition: well balanced diet  · Exercise: 3-4 times a week on average  Depression Screening  PHQ-9 Depression Screening    PHQ-9:    Frequency of the following problems over the past two weeks:       Little interest or pleasure in doing things:  0 - not at all  Feeling down, depressed, or hopeless:  0 - not at all  PHQ-2 Score:  0       General Health  · Sleep: sleeps well  · Hearing: normal - bilateral   · Vision: no vision problems and most recent eye exam <1 year ago  · Dental: regular dental visits and brushes teeth twice daily   Health  · Symptoms include: none     Review of Systems:     Review of Systems   Constitutional: Negative  HENT: Negative  Eyes: Negative  Respiratory: Negative  Cardiovascular: Negative  Gastrointestinal: Negative  Endocrine: Negative  Genitourinary: Negative  Musculoskeletal: Negative  Skin: Negative  Allergic/Immunologic: Negative  Neurological: Negative  Hematological: Negative  Psychiatric/Behavioral: Negative         Past Medical History:     Past Medical History:   Diagnosis Date    Chronic low back pain     Last Assessed: 2/10/2016     GERD (gastroesophageal reflux disease)     Hypertension     Lump of skin     Last Assessed: 2/10/2016     Obesity 5/22/2013    Somatic dysfunction of lumbar region     Last Assessed: 2/18/2016     Whiplash injury to neck     Last Assessed: 2/18/2016       Past Surgical History:     Past Surgical History:   Procedure Laterality Date    APPENDECTOMY      REDUCTION OF TORSION OF TESTIS  1994    SURGERY SCROTAL / TESTICULAR        Family History:     Family History   Problem Relation Age of Onset    Glaucoma Mother     Hypertension Mother    [facility] Hypertension Father       Social History:     Social History     Socioeconomic History    Marital status: /Civil Union     Spouse name: None    Number of children: None    Years of education: None    Highest education level: None   Occupational History    None   Social Needs    Financial resource strain: None    Food insecurity:     Worry: None     Inability: None    Transportation needs:     Medical: None     Non-medical: None   Tobacco Use    Smoking status: Current Some Day Smoker     Types: Cigars    Smokeless tobacco: Current User    Tobacco comment: 1 cigar weekly/ Per allscripts: occasional tobacco smoker    Substance and Sexual Activity    Alcohol use: No     Comment: Per allscripts: Social alcohol use     Drug use: No    Sexual activity: Yes     Partners: Male   Lifestyle    Physical activity:     Days per week: None     Minutes per session: None    Stress: None   Relationships    Social connections:     Talks on phone: None     Gets together: None     Attends Muslim service: None     Active member of club or organization: None     Attends meetings of clubs or organizations: None     Relationship status: None    Intimate partner violence:     Fear of current or ex partner: None     Emotionally abused: None     Physically abused: None     Forced sexual activity: None   Other Topics Concern    None   Social History Narrative    None      Current Medications:     Current Outpatient Medications   Medication Sig Dispense Refill    losartan-hydrochlorothiazide (HYZAAR) 100-25 MG per tablet TAKE ONE TABLET BY MOUTH EVERY MORNING 90 tablet 3    Multiple Vitamins-Minerals (MULTIVITAMIN WITH MINERALS) tablet Take 1 tablet by mouth daily      omeprazole (PriLOSEC) 40 MG capsule TAKE ONE CAPSULE BY MOUTH EVERY DAY 90 capsule 3     No current facility-administered medications for this visit  Allergies:      Allergies   Allergen Reactions    Ace Inhibitors Cough      Physical Exam: /80 (BP Location: Left arm, Patient Position: Sitting, Cuff Size: Large)   Pulse 70   Resp 18   Ht 5' 11" (1 803 m)   Wt (!) 148 kg (327 lb 3 2 oz)   SpO2 96%   BMI 45 64 kg/m²     Physical Exam   Constitutional: He is oriented to person, place, and time  Vital signs are normal  He appears well-developed and well-nourished  HENT:   Head: Normocephalic and atraumatic  Right Ear: External ear normal    Left Ear: External ear normal    Nose: Nose normal    Mouth/Throat: Oropharynx is clear and moist    Eyes: Pupils are equal, round, and reactive to light  Conjunctivae, EOM and lids are normal    Neck: Normal range of motion  Neck supple  Cardiovascular: Normal rate, regular rhythm, S1 normal, S2 normal, normal heart sounds, intact distal pulses and normal pulses  Pulmonary/Chest: Effort normal and breath sounds normal    Abdominal: Soft  Normal appearance and bowel sounds are normal    Musculoskeletal: Normal range of motion  Neurological: He is alert and oriented to person, place, and time  He has normal strength and normal reflexes  Skin: Skin is warm and dry  Psychiatric: He has a normal mood and affect  His speech is normal and behavior is normal  Judgment and thought content normal  Cognition and memory are normal    Nursing note and vitals reviewed        Penny Santos DO  [address] [facility]

## 2019-12-03 DIAGNOSIS — I10 ESSENTIAL HYPERTENSION: Primary | ICD-10-CM

## 2019-12-03 RX ORDER — HYDROCHLOROTHIAZIDE 25 MG/1
25 TABLET ORAL DAILY
Qty: 90 TABLET | Refills: 3 | Status: SHIPPED | OUTPATIENT
Start: 2019-12-03 | End: 2020-06-09 | Stop reason: SDUPTHER

## 2019-12-03 RX ORDER — LOSARTAN POTASSIUM 100 MG/1
100 TABLET ORAL DAILY
Qty: 90 TABLET | Refills: 3 | Status: SHIPPED | OUTPATIENT
Start: 2019-12-03 | End: 2020-06-09 | Stop reason: SDUPTHER

## 2019-12-03 NOTE — TELEPHONE ENCOUNTER
PHARMACY CALLED AND THE LOSARTAN HCTZ 100-25 IS ON BACK ORDER HOWEVER THEY CAN DO THEM SEPARATE   PLS ADVISE

## 2019-12-23 ENCOUNTER — TELEPHONE (OUTPATIENT)
Dept: SLEEP CENTER | Facility: CLINIC | Age: 39
End: 2019-12-23

## 2020-02-17 ENCOUNTER — OFFICE VISIT (OUTPATIENT)
Dept: SLEEP CENTER | Facility: CLINIC | Age: 40
End: 2020-02-17
Payer: COMMERCIAL

## 2020-02-17 VITALS
DIASTOLIC BLOOD PRESSURE: 92 MMHG | WEIGHT: 315 LBS | SYSTOLIC BLOOD PRESSURE: 160 MMHG | HEIGHT: 71 IN | BODY MASS INDEX: 44.1 KG/M2 | HEART RATE: 78 BPM

## 2020-02-17 DIAGNOSIS — K21.9 GERD WITHOUT ESOPHAGITIS: ICD-10-CM

## 2020-02-17 DIAGNOSIS — E66.01 MORBID OBESITY WITH BMI OF 40.0-44.9, ADULT (HCC): ICD-10-CM

## 2020-02-17 DIAGNOSIS — G47.33 OBSTRUCTIVE SLEEP APNEA: Primary | ICD-10-CM

## 2020-02-17 DIAGNOSIS — I10 ESSENTIAL HYPERTENSION: ICD-10-CM

## 2020-02-17 PROCEDURE — 99214 OFFICE O/P EST MOD 30 MIN: CPT | Performed by: INTERNAL MEDICINE

## 2020-02-17 PROCEDURE — 3008F BODY MASS INDEX DOCD: CPT | Performed by: INTERNAL MEDICINE

## 2020-02-17 PROCEDURE — 3077F SYST BP >= 140 MM HG: CPT | Performed by: INTERNAL MEDICINE

## 2020-02-17 PROCEDURE — 3080F DIAST BP >= 90 MM HG: CPT | Performed by: INTERNAL MEDICINE

## 2020-02-17 NOTE — PROGRESS NOTES
Review of Systems      Genitourinary none   Cardiology none   Gastrointestinal none   Neurology awaken with headache   Constitutional weight change   Integumentary none   Psychiatry none   Musculoskeletal none   Pulmonary none   ENT none   Endocrine none   Hematological none
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
## 2020-02-17 NOTE — PATIENT INSTRUCTIONS

## 2020-02-18 ENCOUNTER — TELEPHONE (OUTPATIENT)
Dept: SLEEP CENTER | Facility: CLINIC | Age: 40
End: 2020-02-18

## 2020-03-05 PROBLEM — R73.03 PREDIABETES: Status: ACTIVE | Noted: 2018-11-01

## 2020-03-05 NOTE — ASSESSMENT & PLAN NOTE
-last A1c 5 9 %  -may improve with weight loss and dietary changes  -can consider metformin or Saxenda  -Recommend the patient follow a low fat, low cholesterol diet, limiting refined carbohydrates like white bread, white rice, white pasta, chips/pretzels, sweets/desserts, and sugary beverages  Increase fruits/vegetables  Increase exercise as tolerated
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
## 2020-03-05 NOTE — ASSESSMENT & PLAN NOTE
-Discussed options of HealthyCORE-Intensive Lifestyle Intervention Program, Very Low Calorie Diet-VLCD, Conservative Program, Jack-En-Y Gastric Bypass and Vertical Sleeve Gastrectomy and the role of weight loss medications   -Initial weight loss goal of 5-10% weight loss for improved health  -Screening labs: reviewed  -Patient is interested in pursuing Very Low Calorie Diet-VLCD  Samples provided     Contraindications: no  Labs ordered: reviewed  HTN meds addressed: no adjustments   Monitor blood pressure and notify the provider if consistently around 100/60 or you have symptoms of low blood pressure (lightheadedness/dizziness)  DM2 meds addressed: n/a  VLCD time restriction based on BMI: no

## 2020-03-05 NOTE — ASSESSMENT & PLAN NOTE
-taking losartan-HCTZ  -may improve with weight loss and dietary changes  -Recommend f/u with PCP due to elevated BP  -Risks of untreated/uncontrolled BP reviewed with the pt, including MI, CVA, damage to the blood vessels, eyes/kidneys  -Recommended 911 eval with CP, SOB, headache, blurred vision, weakness, or numbness, or any new sx

## 2020-03-07 ENCOUNTER — APPOINTMENT (OUTPATIENT)
Dept: LAB | Facility: CLINIC | Age: 40
End: 2020-03-07
Payer: COMMERCIAL

## 2020-03-07 ENCOUNTER — TRANSCRIBE ORDERS (OUTPATIENT)
Dept: LAB | Facility: CLINIC | Age: 40
End: 2020-03-07

## 2020-03-07 DIAGNOSIS — R73.01 IFG (IMPAIRED FASTING GLUCOSE): ICD-10-CM

## 2020-03-07 DIAGNOSIS — I10 ESSENTIAL HYPERTENSION: ICD-10-CM

## 2020-03-07 LAB
ALBUMIN SERPL BCP-MCNC: 3.3 G/DL (ref 3.5–5)
ALP SERPL-CCNC: 64 U/L (ref 46–116)
ALT SERPL W P-5'-P-CCNC: 45 U/L (ref 12–78)
ANION GAP SERPL CALCULATED.3IONS-SCNC: 7 MMOL/L (ref 4–13)
AST SERPL W P-5'-P-CCNC: 35 U/L (ref 5–45)
BILIRUB SERPL-MCNC: 0.57 MG/DL (ref 0.2–1)
BUN SERPL-MCNC: 12 MG/DL (ref 5–25)
CALCIUM SERPL-MCNC: 8.9 MG/DL (ref 8.3–10.1)
CHLORIDE SERPL-SCNC: 101 MMOL/L (ref 100–108)
CHOLEST SERPL-MCNC: 184 MG/DL (ref 50–200)
CO2 SERPL-SCNC: 29 MMOL/L (ref 21–32)
CREAT SERPL-MCNC: 1.14 MG/DL (ref 0.6–1.3)
ERYTHROCYTE [DISTWIDTH] IN BLOOD BY AUTOMATED COUNT: 14.6 % (ref 11.6–15.1)
EST. AVERAGE GLUCOSE BLD GHB EST-MCNC: 123 MG/DL
GFR SERPL CREATININE-BSD FRML MDRD: 92 ML/MIN/1.73SQ M
GLUCOSE P FAST SERPL-MCNC: 99 MG/DL (ref 65–99)
HBA1C MFR BLD: 5.9 %
HCT VFR BLD AUTO: 47 % (ref 36.5–49.3)
HDLC SERPL-MCNC: 41 MG/DL
HGB BLD-MCNC: 15.2 G/DL (ref 12–17)
LDLC SERPL CALC-MCNC: 116 MG/DL (ref 0–100)
MCH RBC QN AUTO: 27.3 PG (ref 26.8–34.3)
MCHC RBC AUTO-ENTMCNC: 32.3 G/DL (ref 31.4–37.4)
MCV RBC AUTO: 84 FL (ref 82–98)
PLATELET # BLD AUTO: 249 THOUSANDS/UL (ref 149–390)
PMV BLD AUTO: 10.4 FL (ref 8.9–12.7)
POTASSIUM SERPL-SCNC: 4.1 MMOL/L (ref 3.5–5.3)
PROT SERPL-MCNC: 7.9 G/DL (ref 6.4–8.2)
RBC # BLD AUTO: 5.57 MILLION/UL (ref 3.88–5.62)
SODIUM SERPL-SCNC: 137 MMOL/L (ref 136–145)
TRIGL SERPL-MCNC: 134 MG/DL
TSH SERPL DL<=0.05 MIU/L-ACNC: 1.02 UIU/ML (ref 0.36–3.74)
WBC # BLD AUTO: 7.81 THOUSAND/UL (ref 4.31–10.16)

## 2020-03-07 PROCEDURE — 80061 LIPID PANEL: CPT

## 2020-03-07 PROCEDURE — 36415 COLL VENOUS BLD VENIPUNCTURE: CPT

## 2020-03-07 PROCEDURE — 84443 ASSAY THYROID STIM HORMONE: CPT

## 2020-03-07 PROCEDURE — 83036 HEMOGLOBIN GLYCOSYLATED A1C: CPT

## 2020-03-07 PROCEDURE — 85027 COMPLETE CBC AUTOMATED: CPT

## 2020-03-07 PROCEDURE — 80053 COMPREHEN METABOLIC PANEL: CPT

## 2020-03-10 ENCOUNTER — OFFICE VISIT (OUTPATIENT)
Dept: BARIATRICS | Facility: CLINIC | Age: 40
End: 2020-03-10
Payer: COMMERCIAL

## 2020-03-10 VITALS
WEIGHT: 315 LBS | DIASTOLIC BLOOD PRESSURE: 104 MMHG | HEART RATE: 84 BPM | SYSTOLIC BLOOD PRESSURE: 166 MMHG | BODY MASS INDEX: 44.1 KG/M2 | HEIGHT: 71 IN | TEMPERATURE: 96.9 F

## 2020-03-10 DIAGNOSIS — R73.03 PREDIABETES: ICD-10-CM

## 2020-03-10 DIAGNOSIS — K21.9 GASTROESOPHAGEAL REFLUX DISEASE, ESOPHAGITIS PRESENCE NOT SPECIFIED: ICD-10-CM

## 2020-03-10 DIAGNOSIS — E66.01 MORBID OBESITY WITH BMI OF 45.0-49.9, ADULT (HCC): Primary | ICD-10-CM

## 2020-03-10 DIAGNOSIS — I10 ESSENTIAL HYPERTENSION: ICD-10-CM

## 2020-03-10 DIAGNOSIS — G47.33 OBSTRUCTIVE SLEEP APNEA: ICD-10-CM

## 2020-03-10 PROCEDURE — 99204 OFFICE O/P NEW MOD 45 MIN: CPT | Performed by: PHYSICIAN ASSISTANT

## 2020-03-10 NOTE — PATIENT INSTRUCTIONS
Goals: Food log (ie ) www myfitnesspal com,sparkpeople  com,loseit com,calorieking  com,etc  baritastic  No sugary beverages  At least 64oz of water daily  80 oz while on VLCD   Recommend purchasing a wrist cuff and monitor blood pressure  Notify PCP if persistently elevated (140/90 or higher)  Monitor blood pressure for VLCD and notify the provider if consistently around 100/60 or you have symptoms of low blood pressure (lightheadedness/dizziness)  Recommend the patient follow a low fat, low cholesterol diet, limiting refined carbohydrates like white bread, white rice, white pasta, chips/pretzels, sweets/desserts, and sugary beverages  Increase fruits/vegetables

## 2020-03-11 ENCOUNTER — OFFICE VISIT (OUTPATIENT)
Dept: BARIATRICS | Facility: CLINIC | Age: 40
End: 2020-03-11

## 2020-03-11 VITALS — HEIGHT: 71 IN | BODY MASS INDEX: 44.1 KG/M2 | WEIGHT: 315 LBS

## 2020-03-11 DIAGNOSIS — R63.5 ABNORMAL WEIGHT GAIN: ICD-10-CM

## 2020-03-11 DIAGNOSIS — K21.9 GERD WITHOUT ESOPHAGITIS: ICD-10-CM

## 2020-03-11 DIAGNOSIS — I10 ESSENTIAL HYPERTENSION: ICD-10-CM

## 2020-03-11 PROCEDURE — RECHECK

## 2020-03-11 PROCEDURE — VLCD

## 2020-03-11 RX ORDER — OMEPRAZOLE 40 MG/1
40 CAPSULE, DELAYED RELEASE ORAL DAILY
Qty: 90 CAPSULE | Refills: 3 | Status: SHIPPED | OUTPATIENT
Start: 2020-03-11 | End: 2021-01-25 | Stop reason: SDUPTHER

## 2020-03-11 RX ORDER — LOSARTAN POTASSIUM AND HYDROCHLOROTHIAZIDE 25; 100 MG/1; MG/1
1 TABLET ORAL DAILY
Qty: 90 TABLET | Refills: 3 | Status: SHIPPED | OUTPATIENT
Start: 2020-03-11 | End: 2021-01-25 | Stop reason: SDUPTHER

## 2020-03-12 NOTE — PROGRESS NOTES
Initial RD session for VLCD completed  Components of diet discussed including ketosis, hydration, possible side effects  2 weeks of product ordered and received  Will f/u 3/18/20 via email

## 2020-03-18 ENCOUNTER — TELEPHONE (OUTPATIENT)
Dept: BARIATRICS | Facility: CLINIC | Age: 40
End: 2020-03-18

## 2020-03-30 ENCOUNTER — OFFICE VISIT (OUTPATIENT)
Dept: BARIATRICS | Facility: CLINIC | Age: 40
End: 2020-03-30

## 2020-03-30 VITALS — HEIGHT: 71 IN | BODY MASS INDEX: 44.1 KG/M2 | WEIGHT: 315 LBS

## 2020-03-30 DIAGNOSIS — R63.5 ABNORMAL WEIGHT GAIN: ICD-10-CM

## 2020-03-30 PROCEDURE — VLCD

## 2020-03-30 PROCEDURE — RECHECK

## 2020-03-30 NOTE — PROGRESS NOTES
Weight Management Medical Nutrition Assessment  Anjali Gee is here for 2 wk VLCD f/u with transition to partial replacement keto diet due to COVID  Current wt 326 3 lbs, loss of 14 4 lbs x 2 wks  Meal plan and resources for low calorie keto diet provided  Reports some difficulty now that he is working from home  Trying to keep with normal routine  Options for f/u discussed  He will f/u in 2 wks  Patient seen by Medical Provider in past 6 months:  yes  Requested to schedule appointment with Medical Provider: No      Anthropometric Measurements  Start Weight (#): 340 7 lbs  Current Weight (#): 326 3 lbs  TBW % Change from start weight: 4 4%  Ideal Body Weight (#): 169 lbs  Goal Weight (#): -235 lbs    Weight Loss History  Previous weight loss attempts: Counseling with  MD  Exercise  Self Created Diets (Portion Control, Healthy Food Choices, etc )    Food and Nutrition Related History  Wake up: 6:00   Bed Time: 10:30    Food Recall  Breakfast:replacement   Snack: bar or skip  Lunch: replacement  Snack: bar if didn't have in the morning  Dinner: replacement  Snack: skip    Beverages: water and coffee/tea  Volume of beverage intake: 80 oz+    Weekends: Same  Cravings:  n/a  Trouble area of day: dinner currently due to having to cook for family    Frequency of Eating out: none  Food restrictions:carbs <50 gm while on VLCD  Cooking: self   Food Shopping: self    Physical Activity Intake  Activity:none currently  Frequency:n/a  Physical limitations/barriers to exercise: n/a    Estimated Needs  Energy  [employer] [place] [employer] Needs: BMR : 9803   1-2# loss weekly sedentary:  5282-5803             1-2# loss weekly lightly active: 2938-8755  Protein:  gm     (1 2-1 5g/kg IBW)  Fluid: 90 oz    (35mL/kg IBW)    Nutrition Diagnosis  Yes;     Overweight/obesity  related to Excess energy intake as evidenced by  BMI more than normative standard for age and sex (obesity-grade III 36+)       Nutrition Intervention    Nutrition Prescription  Calories:4590-5848  Protein: 121-131 gm  Carb: 43-49 gm net     Meal Plan (Jace/Pro/Carb)  Breakfast: 200, 27, 10  Snack: 150, 5-10, 0-3  Lunch: 200, 27, 10  Snack: 160, 15, 13  Dinner: 450-600, 42, 10  Snack: 150, 5-10, 0-3    Nutrition Education:    Calorie controlled keto menu  Lean protein food choices  Healthy low carb snack options  Food journaling tips  Keto recipes    Nutrition Counseling:  Strategies:as above    Monitoring and Evaluation:  Evaluation criteria:  Energy Intake  Meet protein needs  Maintain adequate hydration  Monitor weekly weight  Meal planning/preparation  Food journal   Decreased portions at mealtimes and snacks  Physical activity     Barriers to learning:none  Readiness to change: Action:  (Changing behavior)  Comprehension: very good  Expected Compliance: very good

## 2020-06-09 ENCOUNTER — OFFICE VISIT (OUTPATIENT)
Dept: FAMILY MEDICINE CLINIC | Facility: CLINIC | Age: 40
End: 2020-06-09
Payer: COMMERCIAL

## 2020-06-09 VITALS
HEART RATE: 82 BPM | RESPIRATION RATE: 18 BRPM | DIASTOLIC BLOOD PRESSURE: 80 MMHG | OXYGEN SATURATION: 97 % | SYSTOLIC BLOOD PRESSURE: 144 MMHG | BODY MASS INDEX: 42.22 KG/M2 | HEIGHT: 71 IN | TEMPERATURE: 98.7 F | WEIGHT: 301.6 LBS

## 2020-06-09 DIAGNOSIS — I10 ESSENTIAL HYPERTENSION: ICD-10-CM

## 2020-06-09 DIAGNOSIS — M62.838 MUSCLE SPASM OF LEFT SHOULDER: Primary | ICD-10-CM

## 2020-06-09 DIAGNOSIS — E66.01 MORBID OBESITY WITH BMI OF 45.0-49.9, ADULT (HCC): ICD-10-CM

## 2020-06-09 PROCEDURE — 3077F SYST BP >= 140 MM HG: CPT | Performed by: FAMILY MEDICINE

## 2020-06-09 PROCEDURE — 99213 OFFICE O/P EST LOW 20 MIN: CPT | Performed by: FAMILY MEDICINE

## 2020-06-09 PROCEDURE — 3079F DIAST BP 80-89 MM HG: CPT | Performed by: FAMILY MEDICINE

## 2020-06-09 PROCEDURE — 3008F BODY MASS INDEX DOCD: CPT | Performed by: FAMILY MEDICINE

## 2020-06-09 RX ORDER — METHOCARBAMOL 500 MG/1
500 TABLET, FILM COATED ORAL 3 TIMES DAILY
Qty: 30 TABLET | Refills: 0 | Status: SHIPPED | OUTPATIENT
Start: 2020-06-09 | End: 2021-01-25 | Stop reason: ALTCHOICE

## 2020-06-23 ENCOUNTER — EVALUATION (OUTPATIENT)
Dept: PHYSICAL THERAPY | Facility: REHABILITATION | Age: 40
End: 2020-06-23
Payer: COMMERCIAL

## 2020-06-23 DIAGNOSIS — M62.838 MUSCLE SPASM OF LEFT SHOULDER: ICD-10-CM

## 2020-06-23 PROCEDURE — 97110 THERAPEUTIC EXERCISES: CPT | Performed by: PHYSICAL THERAPIST

## 2020-06-23 PROCEDURE — 97161 PT EVAL LOW COMPLEX 20 MIN: CPT | Performed by: PHYSICAL THERAPIST

## 2020-06-23 PROCEDURE — 97140 MANUAL THERAPY 1/> REGIONS: CPT | Performed by: PHYSICAL THERAPIST

## 2020-06-30 ENCOUNTER — OFFICE VISIT (OUTPATIENT)
Dept: PHYSICAL THERAPY | Facility: REHABILITATION | Age: 40
End: 2020-06-30
Payer: COMMERCIAL

## 2020-06-30 DIAGNOSIS — M62.838 MUSCLE SPASM OF LEFT SHOULDER: Primary | ICD-10-CM

## 2020-06-30 PROCEDURE — 97112 NEUROMUSCULAR REEDUCATION: CPT | Performed by: PHYSICAL THERAPIST

## 2020-06-30 PROCEDURE — 97140 MANUAL THERAPY 1/> REGIONS: CPT | Performed by: PHYSICAL THERAPIST

## 2020-06-30 PROCEDURE — 97110 THERAPEUTIC EXERCISES: CPT | Performed by: PHYSICAL THERAPIST

## 2020-07-08 ENCOUNTER — APPOINTMENT (OUTPATIENT)
Dept: PHYSICAL THERAPY | Facility: REHABILITATION | Age: 40
End: 2020-07-08
Payer: COMMERCIAL

## 2020-07-13 ENCOUNTER — OFFICE VISIT (OUTPATIENT)
Dept: PHYSICAL THERAPY | Facility: REHABILITATION | Age: 40
End: 2020-07-13
Payer: COMMERCIAL

## 2020-07-13 DIAGNOSIS — M62.838 MUSCLE SPASM OF LEFT SHOULDER: Primary | ICD-10-CM

## 2020-07-13 PROCEDURE — 97112 NEUROMUSCULAR REEDUCATION: CPT | Performed by: PHYSICAL THERAPIST

## 2020-07-13 PROCEDURE — 97140 MANUAL THERAPY 1/> REGIONS: CPT | Performed by: PHYSICAL THERAPIST

## 2020-07-13 PROCEDURE — 97110 THERAPEUTIC EXERCISES: CPT | Performed by: PHYSICAL THERAPIST

## 2020-07-13 NOTE — PROGRESS NOTES
Daily Note     Today's date: 2020  Patient name: Glendy Villa  : 1980  MRN: [id number]  Referring provider: Linda De La Fuente DO  Dx:   Encounter Diagnosis     ICD-10-CM    1  Muscle spasm of left shoulder M62 838                   Subjective: Reports overall improvement, still does have pain at times, but not as intense as previously       Objective: See treatment diary below      Assessment: Tolerated treatment well  Patient would benefit from continued PT, will monitor symptoms and return in 2 weeks, no complaints throughout today's session  Plan: Continue per plan of care        Precautions: HTN      Manuals            Foam roll posterolateral shoulder CW 5'  CW 5'  CW 5'           IASTM lateral scapular border CW 5'  CW 5'  CW 5'                                     Neuro Re-Ed             Supine scap punches  2x15x5" 5# 2x20x5" 5#           Prone I,Y,T  20x ea 3# 20x ea 3#           Prone shoulder extensions   2x15 30x5"                                                               Ther Ex             UBE   3/3'  3/3'           Sleeper stretch   3x30"  3x30"           TB rows, extensions   2x15 ea MTB np                                     Ther Activity                                       Gait Training                                       Modalities

## 2020-07-27 ENCOUNTER — OFFICE VISIT (OUTPATIENT)
Dept: PHYSICAL THERAPY | Facility: REHABILITATION | Age: 40
End: 2020-07-27
Payer: COMMERCIAL

## 2020-07-27 DIAGNOSIS — M62.838 MUSCLE SPASM OF LEFT SHOULDER: Primary | ICD-10-CM

## 2020-07-27 PROCEDURE — 97530 THERAPEUTIC ACTIVITIES: CPT | Performed by: PHYSICAL THERAPIST

## 2020-07-27 PROCEDURE — 97112 NEUROMUSCULAR REEDUCATION: CPT | Performed by: PHYSICAL THERAPIST

## 2020-07-27 PROCEDURE — 97110 THERAPEUTIC EXERCISES: CPT | Performed by: PHYSICAL THERAPIST

## 2020-07-27 NOTE — PROGRESS NOTES
PT Discharge     Today's date: 2020  Patient name: Samantha Hunt  : 1980  MRN: [id number]  Referring provider: Suze Umana DO  Dx:   Encounter Diagnosis     ICD-10-CM    1  Muscle spasm of left shoulder M62 [id number]                   Assessment  Assessment details:  Patient reports decreased complaints of posterior L shoulder pain  Reports his pain is less constant, but still has some moments of discomfort with sitting or sleeping in certain positions  Patient will be D/C to HEP  Given patient education in proper lifting and carrying mechanics  Patient will be D/C to HEP  Impairments: abnormal or restricted ROM, activity intolerance, impaired physical strength, lacks appropriate home exercise program and pain with function     Prognosis: good    Goals  ST- demonstrate compliancy with HEP in 1 week  Met   Decrease reported pain to 4/10 at worst and with activity, to improve functional capacity, within 3 weeks met   Decrease complaints of posterolateral shoulder point tenderness, within 3 weeks met     LT- Improve FOTO score to specified value to improve patients perceived benefit of therapy, in 8 weeks met   Complete full day of desk work with no complaints of pain, within 8 weeks partially met     Plan  D/C to HEP focusing on improving L shoulder range of motion and strength         Subjective Evaluation    History of Present Illness  Date of onset: 3/2/2020  Mechanism of injury: Chronic onset   Pain  Current pain ratin  At best pain ratin  At worst pain ratin  Location: L posterolateral shoulder   Quality: sharp  Relieving factors: heat and medications    Treatments  Current treatment: medication  Patient Goals  Patient goals for therapy: decreased pain and independence with ADLs/IADLs        Objective     General Comments:      Shoulder Comments   rom  Shoulder flexion WFL B/L  Shoulder abduction WFL B/L  Shoulder ER scratch T4 B/L  Shoulder IR scratch T7 B/L    mmt  Shoulder shrug  B/L  Shoulder flexion 5/5 B/L  Shoulder abduction 5/5 B/L  Shoulder ER 5/5 B/L  Shoulder IR 5/5 B/L       Precautions: HTN      Manuals 6/23 6/30 7/13 7/27         Foam roll posterolateral shoulder CW 5'  CW 5'  CW 5'  np         IASTM lateral scapular border CW 5'  CW 5'  CW 5'  np                                   Neuro Re-Ed             Supine scap punches  2x15x5" 5# 2x20x5" 5#  np         Prone I,Y,T  20x ea 3# 20x ea 3#  20x ea 3#          Prone shoulder extensions   2x15 30x5"  20x5"                                                              Ther Ex             UBE   3/3'  3/3'  3/3'          Sleeper stretch   3x30"  3x30"           TB rows, extensions   2x15 ea MTB np  np                                   Ther Activity                                       Gait Training                                       Modalities

## 2021-01-25 ENCOUNTER — OFFICE VISIT (OUTPATIENT)
Dept: FAMILY MEDICINE CLINIC | Facility: CLINIC | Age: 41
End: 2021-01-25
Payer: COMMERCIAL

## 2021-01-25 VITALS
DIASTOLIC BLOOD PRESSURE: 90 MMHG | HEART RATE: 71 BPM | TEMPERATURE: 97.9 F | OXYGEN SATURATION: 100 % | WEIGHT: 315 LBS | RESPIRATION RATE: 16 BRPM | SYSTOLIC BLOOD PRESSURE: 150 MMHG | HEIGHT: 71 IN | BODY MASS INDEX: 44.1 KG/M2

## 2021-01-25 DIAGNOSIS — R73.03 PREDIABETES: ICD-10-CM

## 2021-01-25 DIAGNOSIS — E66.01 MORBID OBESITY WITH BMI OF 45.0-49.9, ADULT (HCC): ICD-10-CM

## 2021-01-25 DIAGNOSIS — Z23 ENCOUNTER FOR IMMUNIZATION: ICD-10-CM

## 2021-01-25 DIAGNOSIS — I10 ESSENTIAL HYPERTENSION: ICD-10-CM

## 2021-01-25 DIAGNOSIS — K21.9 GERD WITHOUT ESOPHAGITIS: ICD-10-CM

## 2021-01-25 DIAGNOSIS — Z00.00 ANNUAL PHYSICAL EXAM: Primary | ICD-10-CM

## 2021-01-25 PROCEDURE — 3725F SCREEN DEPRESSION PERFORMED: CPT | Performed by: FAMILY MEDICINE

## 2021-01-25 PROCEDURE — 3080F DIAST BP >= 90 MM HG: CPT | Performed by: FAMILY MEDICINE

## 2021-01-25 PROCEDURE — 99396 PREV VISIT EST AGE 40-64: CPT | Performed by: FAMILY MEDICINE

## 2021-01-25 PROCEDURE — 3008F BODY MASS INDEX DOCD: CPT | Performed by: FAMILY MEDICINE

## 2021-01-25 PROCEDURE — 90471 IMMUNIZATION ADMIN: CPT

## 2021-01-25 PROCEDURE — 90686 IIV4 VACC NO PRSV 0.5 ML IM: CPT

## 2021-01-25 PROCEDURE — 3077F SYST BP >= 140 MM HG: CPT | Performed by: FAMILY MEDICINE

## 2021-01-25 RX ORDER — OMEPRAZOLE 40 MG/1
40 CAPSULE, DELAYED RELEASE ORAL DAILY
Qty: 90 CAPSULE | Refills: 3 | Status: SHIPPED | OUTPATIENT
Start: 2021-01-25 | End: 2022-03-25

## 2021-01-25 RX ORDER — MULTIVIT-MIN/IRON FUM/FOLIC AC 7.5 MG-4
1 TABLET ORAL DAILY
COMMUNITY

## 2021-01-25 RX ORDER — AMLODIPINE BESYLATE 5 MG/1
5 TABLET ORAL DAILY
Qty: 90 TABLET | Refills: 3 | Status: SHIPPED | OUTPATIENT
Start: 2021-01-25 | End: 2021-08-11 | Stop reason: SDUPTHER

## 2021-01-25 RX ORDER — LOSARTAN POTASSIUM AND HYDROCHLOROTHIAZIDE 25; 100 MG/1; MG/1
1 TABLET ORAL DAILY
Qty: 90 TABLET | Refills: 3 | Status: SHIPPED | OUTPATIENT
Start: 2021-01-25 | End: 2021-08-25

## 2021-01-25 NOTE — PROGRESS NOTES
[address] [facility] [address]    NAME: Martha Mitchell  AGE: 38 y o  SEX: male  : 1980     DATE: 2021     Assessment and Plan:     Problem List Items Addressed This Visit        Cardiovascular and Mediastinum    Hypertension    Relevant Medications    amLODIPine (NORVASC) 5 mg tablet    losartan-hydrochlorothiazide (HYZAAR) 100-25 MG per tablet    Other Relevant Orders    CBC    Comprehensive metabolic panel    Lipid Panel with Direct LDL reflex    TSH, 3rd generation with Free T4 reflex       Other    Morbid obesity with BMI of 45 0-49 9, adult ([facility])     Diet and exercised discussed         Prediabetes    Relevant Orders    Hemoglobin A1C    Annual physical exam - Primary     Labs ordered  Flu shot today         Encounter for immunization    Relevant Orders    influenza vaccine, quadrivalent, 0 5 mL, preservative-free, for adult and pediatric patients 6 mos+ (AFLURIA, FLUARIX, FLULAVAL, FLUZONE)      Other Visit Diagnoses     GERD without esophagitis        Relevant Medications    omeprazole (PriLOSEC) 40 MG capsule          Immunizations and preventive care screenings were discussed with patient today  Appropriate education was printed on patient's after visit summary  Counseling:  Dental Health: discussed importance of regular tooth brushing, flossing, and dental visits  · Exercise: the importance of regular exercise/physical activity was discussed  Recommend exercise 3-5 times per week for at least 30 minutes  BMI Counseling: Body mass index is 44 61 kg/m²  The BMI is above normal  Nutrition recommendations include encouraging healthy choices of fruits and vegetables  Exercise recommendations include exercising 3-5 times per week  No pharmacotherapy was ordered  Return in 1 year (on 2022)       Chief Complaint:     Chief Complaint   Patient presents with    Annual Exam     Patient here for annual wellness exam History of Present Illness:     Adult Annual Physical   Patient here for a comprehensive physical exam  The patient reports problems - feeling bp is up, weight has gone up about 20 pounds  Diet and Physical Activity  · Diet/Nutrition: poor diet  · Exercise: was exercising and then quit, and restarted  Depression Screening  PHQ-9 Depression Screening    PHQ-9:   Frequency of the following problems over the past two weeks:      Little interest or pleasure in doing things: 0 - not at all  Feeling down, depressed, or hopeless: 0 - not at all  PHQ-2 Score: 0       General Health  · Sleep: sleeps well  · Hearing: normal - bilateral   · Vision: no vision problems  · Dental: regular dental visits and brushes teeth twice daily   Health  · Symptoms include: none     Review of Systems:     Review of Systems   Constitutional: Negative  HENT: Negative  Eyes: Negative  Respiratory: Negative  Cardiovascular: Negative  Gastrointestinal: Negative  Endocrine: Negative  Genitourinary: Negative  Musculoskeletal: Negative  Allergic/Immunologic: Negative  Neurological: Positive for headaches  Hematological: Negative  Psychiatric/Behavioral: Negative         Past Medical History:     Past Medical History:   Diagnosis Date    Chronic low back pain     Last Assessed: 2/10/2016     GERD (gastroesophageal reflux disease)     Hypertension     Lump of skin     Last Assessed: 2/10/2016     Obesity 5/22/2013    Sleep apnea     Somatic dysfunction of lumbar region     Last Assessed: 2/18/2016     Whiplash injury to neck     Last Assessed: 2/18/2016       Past Surgical History:     Past Surgical History:   Procedure Laterality Date    APPENDECTOMY      REDUCTION OF TORSION OF TESTIS  1994    SURGERY SCROTAL / TESTICULAR      WISDOM TOOTH EXTRACTION        Family History:     Family History   Problem Relation Age of Onset    Glaucoma Mother     Hypertension Mother    Nathaly Levy Hypertension Father     Stroke Maternal Grandmother     Heart disease Neg Hx     Diabetes Neg Hx     Thyroid disease Neg Hx     Cancer Neg Hx       Social History:        Social History     Socioeconomic History    Marital status: /Civil Union     Spouse name: None    Number of children: None    Years of education: None    Highest education level: None   Occupational History    None   Social Needs    Financial resource strain: None    Food insecurity     Worry: None     Inability: None    Transportation needs     Medical: None     Non-medical: None   Tobacco Use    Smoking status: Current Some Day Smoker     Types: Cigars    Smokeless tobacco: Current User    Tobacco comment: 1 cigar weekly/ Per allscripts: occasional tobacco smoker    Substance and Sexual Activity    Alcohol use: Yes     Comment: Per allscripts: Social alcohol use     Drug use: No    Sexual activity: Yes     Partners: Male   Lifestyle    Physical activity     Days per week: None     Minutes per session: None    Stress: None   Relationships    Social connections     Talks on phone: None     Gets together: None     Attends Taoist service: None     Active member of club or organization: None     Attends meetings of clubs or organizations: None     Relationship status: None    Intimate partner violence     Fear of current or ex partner: None     Emotionally abused: None     Physically abused: None     Forced sexual activity: None   Other Topics Concern    None   Social History Narrative    None      Current Medications:     Current Outpatient Medications   Medication Sig Dispense Refill    losartan-hydrochlorothiazide (HYZAAR) 100-25 MG per tablet Take 1 tablet by mouth daily 90 tablet 3    Multiple Vitamins-Minerals (multivitamin with minerals) tablet Take 1 tablet by mouth daily      omeprazole (PriLOSEC) 40 MG capsule Take 1 capsule (40 mg total) by mouth daily 90 capsule 3    amLODIPine (NORVASC) 5 mg tablet Take 1 tablet (5 mg total) by mouth daily 90 tablet 3     No current facility-administered medications for this visit  Allergies: Allergies   Allergen Reactions    Ace Inhibitors Cough      Physical Exam:     /90   Pulse 71   Temp 97 9 °F (36 6 °C)   Resp 16   Ht 5' 11 1" (1 806 m)   Wt (!) 146 kg (320 lb 12 8 oz)   SpO2 100%   BMI 44 61 kg/m²     Physical Exam  Vitals signs and nursing note reviewed  Constitutional:       Appearance: He is well-developed  HENT:      Head: Normocephalic and atraumatic  Right Ear: Tympanic membrane, ear canal and external ear normal       Left Ear: Tympanic membrane, ear canal and external ear normal       Nose: Nose normal    Eyes:      Extraocular Movements: Extraocular movements intact  Conjunctiva/sclera: Conjunctivae normal       Pupils: Pupils are equal, round, and reactive to light  Neck:      Musculoskeletal: Normal range of motion and neck supple  Cardiovascular:      Rate and Rhythm: Normal rate and regular rhythm  Pulses: Normal pulses  Heart sounds: Normal heart sounds  Pulmonary:      Effort: Pulmonary effort is normal       Breath sounds: Normal breath sounds  Abdominal:      General: Abdomen is flat  Bowel sounds are normal       Palpations: Abdomen is soft  Musculoskeletal: Normal range of motion  Skin:     General: Skin is warm and dry  Capillary Refill: Capillary refill takes less than 2 seconds  Neurological:      General: No focal deficit present  Mental Status: He is alert and oriented to person, place, and time  Psychiatric:         Mood and Affect: Mood normal          Behavior: Behavior normal          Thought Content:  Thought content normal          Judgment: Judgment normal           Johana Decree, DO  [address] [facility]

## 2021-01-25 NOTE — PATIENT INSTRUCTIONS

## 2021-02-17 ENCOUNTER — OFFICE VISIT (OUTPATIENT)
Dept: SLEEP CENTER | Facility: CLINIC | Age: 41
End: 2021-02-17
Payer: COMMERCIAL

## 2021-02-17 VITALS
WEIGHT: 315 LBS | SYSTOLIC BLOOD PRESSURE: 140 MMHG | HEIGHT: 71 IN | DIASTOLIC BLOOD PRESSURE: 90 MMHG | BODY MASS INDEX: 44.1 KG/M2

## 2021-02-17 DIAGNOSIS — E66.01 MORBID OBESITY WITH BMI OF 40.0-44.9, ADULT (HCC): ICD-10-CM

## 2021-02-17 DIAGNOSIS — Z72.821 INADEQUATE SLEEP HYGIENE: ICD-10-CM

## 2021-02-17 DIAGNOSIS — G47.33 OBSTRUCTIVE SLEEP APNEA: Primary | ICD-10-CM

## 2021-02-17 DIAGNOSIS — I10 ESSENTIAL HYPERTENSION: ICD-10-CM

## 2021-02-17 DIAGNOSIS — K21.9 GERD WITHOUT ESOPHAGITIS: ICD-10-CM

## 2021-02-17 PROCEDURE — 3080F DIAST BP >= 90 MM HG: CPT | Performed by: INTERNAL MEDICINE

## 2021-02-17 PROCEDURE — 3008F BODY MASS INDEX DOCD: CPT | Performed by: INTERNAL MEDICINE

## 2021-02-17 PROCEDURE — 3077F SYST BP >= 140 MM HG: CPT | Performed by: INTERNAL MEDICINE

## 2021-02-17 PROCEDURE — 99214 OFFICE O/P EST MOD 30 MIN: CPT | Performed by: INTERNAL MEDICINE

## 2021-02-17 NOTE — PATIENT INSTRUCTIONS

## 2021-02-17 NOTE — PROGRESS NOTES
Follow-Up Note - Sleep Center   Susanne Doran  38 y o  male  :1980  YYH:[id number]    CC: I saw this patient for follow-up in clinic today for Sleep disordered breathing, Coexisting Sleep and Medical Problems  The diagnostic study demonstrated an AHI of 87 per hour, considerably higher while supine  Minimum oxygen saturation was 77% and he spent 65 4% of the study with saturations less than 90%  During the subsequent therapeutic study, he required CPAP at 7 cm H2O to remediate his sleep disordered breathing     PFSH, Problem List, Medications & Allergies were reviewed in EMR  Interval changes: none reported  He  has a past medical history of Chronic low back pain, GERD (gastroesophageal reflux disease), Hypertension, Lump of skin, Obesity (2013), Sleep apnea, Somatic dysfunction of lumbar region, and Whiplash injury to neck  He has a current medication list which includes the following prescription(s): amlodipine, losartan-hydrochlorothiazide, omeprazole, and multivitamin with minerals  ROS: as attached  Significant for weight reduction  He reported no nasal, respiratory or cardiac symptoms  Acid reflux is controlled     PHYSIOLOGICAL DATA REVIEW AND INTERPRETATION:   using PAP > 4 hours/night 80%  Estimated RADHA 0 4/hour at @ 90th percentile  pressure of 9cm H2O   Compliance: Very good; Sleep disordered breathing:stable    SUBJECTIVE: Regarding use of PAP, Dudley reports:   · He is experiencing no  adverse effects:   · He is benefiting from use: no longer snoring / having breathing difficulties   Sleep Routine: He reports getting 6 5 hrs sleep  ; he no difficulty initiating, but reports diffculty maintaining sleep   He awakens spontaneously and feels refreshed  Delphine Boas denies EDS:  He  rated himself at Total score: 2 /24 on the [place] sleepiness scale ]         Habits: reports that he has been smoking cigars   He uses smokeless tobacco ,  reports current alcohol use ,  reports no history of drug use , Caffeine use: excessive until around 8:00 p m , Exercise routine: regular that he started around 2 weeks ago   EXAM: /90   Ht 5' 11" (1 803 m)   Wt (!) 145 kg (319 lb 3 2 oz)   BMI 44 52 kg/m²     Patient is well groomed; well appearing  Skin/Extrem: col & hydration normal; no edema  Psych: cooperativeand in no distress  Mental state:appears normal   CNS: Alert, orientated, clear & coherent speech  H&N: EOMI; NC/AT:no facial pressure marks, no rashes  Physical findings otherwise essentially unchanged from previous  IMPRESSION: Diagnoses, Problem List Items & Comorbidities Addressed this Visit    1  Obstructive sleep apnea  PAP DME Resupply/Reorder   2  Inadequate sleep hygiene     3  GERD without esophagitis     4  Essential hypertension     5  Morbid obesity with BMI of 40 0-44 9, adult ([facility] [address] )       PLAN:  1  I reviewed results of prior studies and physiologic data with the patient  I discussed treatment options with risks and benefits  2  Treatment with  PAP is medically necessary and Dudley is agreable to continue use  3  Care of equipment, methods to improve comfort using PAP and importance of compliance with therapy were discussed  4  Pressure setting: continue 7-10 cmH2O     5  Rx provided to replace supplies and Care coordinated with DME provider  6  Cognitive behavioral therapy was initiated, Sleep Hygiene and behavioral techniques to manage Insomnia were discussed  Specifically, avoiding caffeine use after 3:00 p m , continue regular exercise and allow sufficient opportunity for sleep  7  Discussed  strategies for weight reduction  8  Follow-up is advised in 1 year or sooner if needed to monitor progress, compliance and to adjust therapy  Thank you for allowing me to participate in the care of this patient      Sincerely,    Authenticated electronically by Michael Nevarez MD on 97/97/63   Board Certified Specialist

## 2021-02-18 ENCOUNTER — TELEPHONE (OUTPATIENT)
Dept: SLEEP CENTER | Facility: CLINIC | Age: 41
End: 2021-02-18

## 2021-02-28 ENCOUNTER — LAB (OUTPATIENT)
Dept: LAB | Facility: CLINIC | Age: 41
End: 2021-02-28
Payer: COMMERCIAL

## 2021-02-28 DIAGNOSIS — I10 ESSENTIAL HYPERTENSION: ICD-10-CM

## 2021-02-28 DIAGNOSIS — R73.03 PREDIABETES: ICD-10-CM

## 2021-02-28 LAB
ALBUMIN SERPL BCP-MCNC: 3.8 G/DL (ref 3.5–5)
ALP SERPL-CCNC: 51 U/L (ref 46–116)
ALT SERPL W P-5'-P-CCNC: 44 U/L (ref 12–78)
ANION GAP SERPL CALCULATED.3IONS-SCNC: 6 MMOL/L (ref 4–13)
AST SERPL W P-5'-P-CCNC: 22 U/L (ref 5–45)
BILIRUB SERPL-MCNC: 0.52 MG/DL (ref 0.2–1)
BUN SERPL-MCNC: 16 MG/DL (ref 5–25)
CALCIUM SERPL-MCNC: 9.2 MG/DL (ref 8.3–10.1)
CHLORIDE SERPL-SCNC: 105 MMOL/L (ref 100–108)
CHOLEST SERPL-MCNC: 221 MG/DL (ref 50–200)
CO2 SERPL-SCNC: 30 MMOL/L (ref 21–32)
CREAT SERPL-MCNC: 1.32 MG/DL (ref 0.6–1.3)
ERYTHROCYTE [DISTWIDTH] IN BLOOD BY AUTOMATED COUNT: 14.4 % (ref 11.6–15.1)
EST. AVERAGE GLUCOSE BLD GHB EST-MCNC: 111 MG/DL
GFR SERPL CREATININE-BSD FRML MDRD: 77 ML/MIN/1.73SQ M
GLUCOSE P FAST SERPL-MCNC: 99 MG/DL (ref 65–99)
HBA1C MFR BLD: 5.5 %
HCT VFR BLD AUTO: 47.2 % (ref 36.5–49.3)
HDLC SERPL-MCNC: 53 MG/DL
HGB BLD-MCNC: 15.1 G/DL (ref 12–17)
LDLC SERPL CALC-MCNC: 148 MG/DL (ref 0–100)
MCH RBC QN AUTO: 27.4 PG (ref 26.8–34.3)
MCHC RBC AUTO-ENTMCNC: 32 G/DL (ref 31.4–37.4)
MCV RBC AUTO: 86 FL (ref 82–98)
PLATELET # BLD AUTO: 251 THOUSANDS/UL (ref 149–390)
PMV BLD AUTO: 10.5 FL (ref 8.9–12.7)
POTASSIUM SERPL-SCNC: 4.1 MMOL/L (ref 3.5–5.3)
PROT SERPL-MCNC: 7.9 G/DL (ref 6.4–8.2)
RBC # BLD AUTO: 5.51 MILLION/UL (ref 3.88–5.62)
SODIUM SERPL-SCNC: 141 MMOL/L (ref 136–145)
TRIGL SERPL-MCNC: 100 MG/DL
TSH SERPL DL<=0.05 MIU/L-ACNC: 1.34 UIU/ML (ref 0.36–3.74)
WBC # BLD AUTO: 6.96 THOUSAND/UL (ref 4.31–10.16)

## 2021-02-28 PROCEDURE — 80061 LIPID PANEL: CPT

## 2021-02-28 PROCEDURE — 83036 HEMOGLOBIN GLYCOSYLATED A1C: CPT

## 2021-02-28 PROCEDURE — 84443 ASSAY THYROID STIM HORMONE: CPT

## 2021-02-28 PROCEDURE — 85027 COMPLETE CBC AUTOMATED: CPT

## 2021-02-28 PROCEDURE — 36415 COLL VENOUS BLD VENIPUNCTURE: CPT

## 2021-02-28 PROCEDURE — 80053 COMPREHEN METABOLIC PANEL: CPT

## 2021-03-10 DIAGNOSIS — Z23 ENCOUNTER FOR IMMUNIZATION: ICD-10-CM

## 2021-03-14 ENCOUNTER — IMMUNIZATIONS (OUTPATIENT)
Dept: FAMILY MEDICINE CLINIC | Facility: HOSPITAL | Age: 41
End: 2021-03-14

## 2021-03-14 DIAGNOSIS — Z23 ENCOUNTER FOR IMMUNIZATION: Primary | ICD-10-CM

## 2021-03-14 PROCEDURE — 91300 SARS-COV-2 / COVID-19 MRNA VACCINE (PFIZER-BIONTECH) 30 MCG: CPT

## 2021-03-14 PROCEDURE — 0001A SARS-COV-2 / COVID-19 MRNA VACCINE (PFIZER-BIONTECH) 30 MCG: CPT

## 2021-03-18 ENCOUNTER — TELEPHONE (OUTPATIENT)
Dept: UROLOGY | Facility: CLINIC | Age: 41
End: 2021-03-18

## 2021-03-18 NOTE — TELEPHONE ENCOUNTER
R/S letter mailed to patient, informed patient provider had a change in schedule and he can reschedule for next available any provider

## 2021-04-05 ENCOUNTER — IMMUNIZATIONS (OUTPATIENT)
Dept: FAMILY MEDICINE CLINIC | Facility: HOSPITAL | Age: 41
End: 2021-04-05

## 2021-04-05 DIAGNOSIS — Z23 ENCOUNTER FOR IMMUNIZATION: Primary | ICD-10-CM

## 2021-04-05 PROCEDURE — 0002A SARS-COV-2 / COVID-19 MRNA VACCINE (PFIZER-BIONTECH) 30 MCG: CPT

## 2021-04-05 PROCEDURE — 91300 SARS-COV-2 / COVID-19 MRNA VACCINE (PFIZER-BIONTECH) 30 MCG: CPT

## 2021-04-26 ENCOUNTER — OFFICE VISIT (OUTPATIENT)
Dept: PHYSICAL THERAPY | Facility: REHABILITATION | Age: 41
End: 2021-04-26
Payer: COMMERCIAL

## 2021-04-26 DIAGNOSIS — G89.29 CHRONIC LEFT SHOULDER PAIN: Primary | ICD-10-CM

## 2021-04-26 DIAGNOSIS — M25.512 CHRONIC LEFT SHOULDER PAIN: Primary | ICD-10-CM

## 2021-04-26 PROCEDURE — 97112 NEUROMUSCULAR REEDUCATION: CPT | Performed by: PHYSICAL THERAPIST

## 2021-04-26 PROCEDURE — 97110 THERAPEUTIC EXERCISES: CPT | Performed by: PHYSICAL THERAPIST

## 2021-04-26 PROCEDURE — 97161 PT EVAL LOW COMPLEX 20 MIN: CPT | Performed by: PHYSICAL THERAPIST

## 2021-04-26 NOTE — PROGRESS NOTES
PT Evaluation     Today's date: 2021  Patient name: Alicia Gonzalez  : 1980  MRN: [id number]  Referring provider: Jane Ludwig DO  Dx:   Encounter Diagnosis     ICD-10-CM    1  Chronic left shoulder pain  M25 512 PT plan of care cert/re-cert    [id number]        Start Time: 0811  Stop Time: 1128  Total time in clinic (min): 39 minutes    Assessment  Assessment details: Patient presents complaining of L shoulder pain, which has been ongoing for the past 3-4 months, he reports no increase in activity or trauma prior to the pain beginning  He reports the pain is worse when lifting his arms up while putting dishes away, as well as in self dressing tasks, he reports feeling pain during his lifting activities as well    He locates his pain to the anterior aspect of his upper arm and shoulder, reports no pain in his posterior shoulder or L sided neck pain, he reports no numbness and tingling currently     He has no x-rays currently     He is currently working completing mostly desk work, reports no pain with these tasks     Patient presents with limitations in L shoulder range of motion and strength consistent with potential RTC pathology, no complete RTC compromise evident  Patient would benefit from skilled physical therapy to address limitations and deficits  Patient provided with HEP  Patient made aware of condition as well as the proposed treatment plan, including risks, benefits and alternatives  Impairments: abnormal or restricted ROM, activity intolerance, impaired physical strength, lacks appropriate home exercise program and pain with function     Prognosis: good    Goals  ST- demonstrate compliancy with HEP in 1 week     Decrease reported pain to 4/10 at worst and with activity, to improve functional capacity, within 3 weeks   Improve L shoulder range of motion to normal with no complaints of pain , within 3 weeks     LT- Improve FOTO score to specified value to improve patients perceived benefit of therapy, in 8 weeks   Improve strength to 4+ in all planes at shoulder, with no complaints of pain, within 8 weeks   Be able to complete self dressing tasks at home with no complaints of L shoulder pain, within 8 weeks     Plan  Plan details: Physical therapy with focus on there ex and manual therapy to improve ability to complete tasks around the house and complete functional activities, use of modalities as needed     Patient would benefit from: skilled physical therapy  Referral necessary: No  Planned modality interventions: cryotherapy, TENS and thermotherapy: hydrocollator packs  Planned therapy interventions: ADL training, balance, balance/weight bearing training, gait training, manual therapy, joint mobilization, neuromuscular re-education, strengthening, stretching, therapeutic activities and therapeutic exercise  Frequency: 2x week  Duration in weeks: 8  Plan of Care beginning date: 2021  Plan of Care expiration date: 2021  Treatment plan discussed with: patient        Subjective Evaluation    History of Present Illness  Date of onset: 2021  Mechanism of injury: Chronic onset   Pain  Current pain ratin  At best pain ratin  At worst pain ratin  Location: L anterior shoulder, upper arm   Quality: sharp  Relieving factors: relaxation and rest  Aggravating factors: overhead activity  Progression: worsening    Treatments  Previous treatment: physical therapy  Patient Goals  Patient goals for therapy: decreased pain, independence with ADLs/IADLs and increased motion          Objective     General Comments:      Shoulder Comments   No ttp noted in  [address] joint, AC joint or distally down anterior UE     rom  Shoulder flexion L=WFL R=WFL  Shoulder abduction L=WFL R=WFL  Shoulder ER scratch L=T1* R=T3  Shoulder IR scratch L=T9 R=T9    mmt  Shoulder shrug L=4+ R=4+  Shoulder flexion L=4-* R=4+  Shoulder abduction L=4 R=4+  Shoulder ER L=4-* R=4+  Shoulder IR L=4+ R=4+  Elbow flexion L=4+ R=4+  Elbow extension L=4+ R=4+    Special tests  Painful arc (+)   Drop arm (-)   ER lag  Dontrente [place]in[place] (-)     Joint play  Post hypomobile on L   Inf hypomobile w some p!  On L             Precautions: none       Manuals             L shoulder ROM                                                    Neuro Re-Ed             SL flex/ abd                                                                                            Ther Ex             UBE             Wall slides flex             Supine cane flex/ abd                                                                               Ther Activity                                       Gait Training                                       Modalities

## 2021-06-15 ENCOUNTER — OFFICE VISIT (OUTPATIENT)
Dept: UROLOGY | Facility: CLINIC | Age: 41
End: 2021-06-15
Payer: COMMERCIAL

## 2021-06-15 VITALS
SYSTOLIC BLOOD PRESSURE: 162 MMHG | BODY MASS INDEX: 44.1 KG/M2 | DIASTOLIC BLOOD PRESSURE: 90 MMHG | HEART RATE: 88 BPM | WEIGHT: 315 LBS | HEIGHT: 71 IN

## 2021-06-15 DIAGNOSIS — Z30.2 ENCOUNTER FOR STERILIZATION: ICD-10-CM

## 2021-06-15 DIAGNOSIS — Z87.438 HISTORY OF TORSION OF TESTIS: Primary | ICD-10-CM

## 2021-06-15 PROCEDURE — 3008F BODY MASS INDEX DOCD: CPT | Performed by: UROLOGY

## 2021-06-15 PROCEDURE — 3080F DIAST BP >= 90 MM HG: CPT | Performed by: UROLOGY

## 2021-06-15 PROCEDURE — 99204 OFFICE O/P NEW MOD 45 MIN: CPT | Performed by: UROLOGY

## 2021-06-15 PROCEDURE — 3077F SYST BP >= 140 MM HG: CPT | Performed by: UROLOGY

## 2021-06-15 NOTE — PROGRESS NOTES
Referring Physician: Stan See DO  A copy of this consultation note was communicated to the referring physician  Diagnoses and all orders for this visit:    History of torsion of testis  -     Case request operating room: VASECTOMY; Standing  -     Case request operating room: VASECTOMY    Encounter for sterilization    Other orders  -     Diet NPO; Sips with meds; Standing  -     Place sequential compression device; Standing  -     ceFAZolin (ANCEF) 3,000 mg in dextrose 5 % 100 mL IVPB            Assessment and plan:       We had a long discussion regarding the options for birth control  I told the patient that vasectomy is considered to be a permanent surgical sterilization procedure  We spoke about other options including the possibility of vasectomy reversal at a later time  He understands that vasectomy confers no immunity to STDs  I also told him that according to our present knowledge, there is no causal relationship between vasectomy and subsequent development of prostate cancer  We spoke about the potential complications  The most common one in the short term is scrotal hematoma and infection, which rarely requires re-operation  Additionally, he can react to the anesthetic, develop scrotal swelling, have pain or skin bruising  We spoke about post procedure care to try to minimize this complication  I also asked him to refrain from aspirin products and alcohol prior to the procedure  The long-term complications include but are not limited to vasectomy failure by recanalization, chronic epididymal discomfort, pain, among other possibilities  Finally, he understands that following vasectomy, hell need to use other means of birth control until hes had semen analyses that demonstrate the absence of sperm  He understands it will be his responsibility to submit these semen specimens and call our office for the results   I told him again that recanalization is a small but real possibility, and if he is ever concerned about it he can submit another semen specimen for analysis  After discussing the risks, benefits, possible complications and alternatives, informed consents were obtained  due to the patient's body habitus, 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, have recommended performing his procedure in the [facility]ulatory [facility] under IV sedation  He is amenable to this plan  Informed consent was obtained and surgical be arranged at his convenience      Chief Complaint     Desire for vasectomy      History of Present Illness     James Guzman is a 38 y o  male referred for evaluation of vasectomy  He has 2 biological children  He states that he and his partner have come to the mutual decision they do not desire any additional children  He has no prior past medical, past surgical, or past urologic history    Detailed Urologic History     - please refer to HPI    Review of Systems     Review of Systems   Constitutional: Negative for activity change and fatigue  HENT: Negative for congestion  Eyes: Negative for visual disturbance  Respiratory: Negative for shortness of breath and wheezing  Cardiovascular: Negative for chest pain and leg swelling  Gastrointestinal: Negative for abdominal pain  Endocrine: Negative for polyuria  Genitourinary: Negative for dysuria, flank pain, hematuria and urgency  Musculoskeletal: Negative for back pain  Allergic/Immunologic: Negative for immunocompromised state  Neurological: Negative for dizziness and numbness  Psychiatric/Behavioral: Negative for dysphoric mood  All other systems reviewed and are negative  Allergies     Allergies   Allergen Reactions    Ace Inhibitors Cough       Physical Exam     Physical Exam   Constitutional: He is oriented to person, place, and time  He appears well-developed and well-nourished  No distress  HENT:   Head: Normocephalic and atraumatic  Eyes: EOM are normal    Neck: Normal range of motion  Cardiovascular:   Negative lower extremity edema   Pulmonary/Chest: Effort normal and breath sounds normal    Abdominal: Soft  Genitourinary:   Genitourinary Comments: Vas deferens are palpable bilaterally  the left testes is markedly atrophic  The vas deferens is palpable but is also atrophic  There is a epididymal head cyst as well  Musculoskeletal: Normal range of motion  Neurological: He is alert and oriented to person, place, and time  Skin: Skin is warm  Psychiatric: He has a normal mood and affect   His behavior is normal          Vital Signs  Vitals:    06/15/21 1103   BP: 162/90   Pulse: 88   Weight: (!) 146 kg (321 lb)   Height: 5' 11" (1 803 m)         Current Medications       Current Outpatient Medications:     amLODIPine (NORVASC) 5 mg tablet, Take 1 tablet (5 mg total) by mouth daily, Disp: 90 tablet, Rfl: 3    losartan-hydrochlorothiazide (HYZAAR) 100-25 MG per tablet, Take 1 tablet by mouth daily, Disp: 90 tablet, Rfl: 3    Multiple Vitamins-Minerals (multivitamin with minerals) tablet, Take 1 tablet by mouth daily, Disp: , Rfl:     omeprazole (PriLOSEC) 40 MG capsule, Take 1 capsule (40 mg total) by mouth daily, Disp: 90 capsule, Rfl: 3      Active Problems     Patient Active Problem List   Diagnosis    GERD (gastroesophageal reflux disease)    Hiatal hernia    Hypertension    Morbid obesity with BMI of 45 0-49 9, adult [facility]    Obstructive sleep apnea    Well adult exam    Prediabetes    Annual physical exam    Encounter for sterilization    Muscle spasm of left shoulder    History of torsion of testis         Past Medical History     Past Medical History:   Diagnosis Date    Chronic low back pain     Last Assessed: 2/10/2016     GERD (gastroesophageal reflux disease)     Hypertension     Lump of skin     Last Assessed: 2/10/2016     Obesity 5/22/2013    Sleep apnea     Somatic dysfunction of lumbar region     Last Assessed: 2/18/2016     Whiplash injury to neck     Last Assessed: 2/18/2016          Surgical History     Past Surgical History:   Procedure Laterality Date    APPENDECTOMY      REDUCTION OF TORSION OF TESTIS  1994    SURGERY SCROTAL / TESTICULAR      WISDOM TOOTH EXTRACTION           Family History     Family History   Problem Relation Age of Onset   [facility] Glaucoma Mother     Hypertension Mother     Hypertension Father     Stroke Maternal Grandmother     Heart disease Neg Hx     Diabetes Neg Hx     Thyroid disease Neg Hx     Cancer Neg Hx          Social History     Social History     Social History     Tobacco Use   Smoking Status Current Some Day Smoker    Types: Cigars   Smokeless Tobacco Current User   Tobacco Comment    1 cigar weekly/ Per allscripts: occasional tobacco smoker        Portions of the record may have been created with voice recognition software  Occasional wrong word or "sound a like" substitutions may have occurred due to the inherent limitations of voice recognition software  Read the chart carefully and recognize, using context, where substitutions have occurred

## 2021-06-16 ENCOUNTER — TELEPHONE (OUTPATIENT)
Dept: UROLOGY | Facility: CLINIC | Age: 41
End: 2021-06-16

## 2021-06-29 NOTE — TELEPHONE ENCOUNTER
Made last attempt to sched patient  Advised when he is ready to pls call  Attempt to contact letter being mailed

## 2021-08-11 ENCOUNTER — OFFICE VISIT (OUTPATIENT)
Dept: FAMILY MEDICINE CLINIC | Facility: CLINIC | Age: 41
End: 2021-08-11
Payer: COMMERCIAL

## 2021-08-11 VITALS
SYSTOLIC BLOOD PRESSURE: 144 MMHG | OXYGEN SATURATION: 98 % | TEMPERATURE: 97.9 F | HEIGHT: 71 IN | HEART RATE: 75 BPM | DIASTOLIC BLOOD PRESSURE: 102 MMHG | WEIGHT: 315 LBS | RESPIRATION RATE: 20 BRPM | BODY MASS INDEX: 44.1 KG/M2

## 2021-08-11 DIAGNOSIS — E66.01 MORBID OBESITY WITH BMI OF 45.0-49.9, ADULT (HCC): ICD-10-CM

## 2021-08-11 DIAGNOSIS — E78.2 MIXED HYPERLIPIDEMIA: ICD-10-CM

## 2021-08-11 DIAGNOSIS — I10 ESSENTIAL HYPERTENSION: Primary | ICD-10-CM

## 2021-08-11 DIAGNOSIS — K21.9 GASTROESOPHAGEAL REFLUX DISEASE, UNSPECIFIED WHETHER ESOPHAGITIS PRESENT: ICD-10-CM

## 2021-08-11 DIAGNOSIS — R73.03 PREDIABETES: ICD-10-CM

## 2021-08-11 PROCEDURE — 99214 OFFICE O/P EST MOD 30 MIN: CPT | Performed by: FAMILY MEDICINE

## 2021-08-11 PROCEDURE — 3077F SYST BP >= 140 MM HG: CPT | Performed by: FAMILY MEDICINE

## 2021-08-11 PROCEDURE — 1036F TOBACCO NON-USER: CPT | Performed by: FAMILY MEDICINE

## 2021-08-11 PROCEDURE — 3080F DIAST BP >= 90 MM HG: CPT | Performed by: FAMILY MEDICINE

## 2021-08-11 PROCEDURE — 3008F BODY MASS INDEX DOCD: CPT | Performed by: FAMILY MEDICINE

## 2021-08-11 RX ORDER — AMLODIPINE BESYLATE 10 MG/1
10 TABLET ORAL DAILY
Qty: 90 TABLET | Refills: 3 | Status: SHIPPED | OUTPATIENT
Start: 2021-08-11 | End: 2021-09-08 | Stop reason: SDUPTHER

## 2021-08-11 NOTE — PROGRESS NOTES
Assessment/Plan:    1  Essential hypertension  Assessment & Plan:  Increase norvasc  Recheck in to 10mg    Orders:  -     amLODIPine (NORVASC) 10 mg tablet; Take 1 tablet (10 mg total) by mouth daily  -     Comprehensive metabolic panel; Future; Expected date: 08/11/2021  -     TSH, 3rd generation with Free T4 reflex; Future; Expected date: 08/11/2021    2  Morbid obesity with BMI of 45 0-49 9, adult [facility])  Assessment & Plan:  Diet and exercise      3  Gastroesophageal reflux disease, unspecified whether esophagitis present  Assessment & Plan: On omeprazole      4  Prediabetes  -     Hemoglobin A1C; Future; Expected date: 08/11/2021    5  Mixed hyperlipidemia  -     Lipid Panel with Direct LDL reflex; Future; Expected date: 08/11/2021  -     TSH, 3rd generation with Free T4 reflex; Future; Expected date: 08/11/2021          There are no Patient Instructions on file for this visit  Return in about 4 months (around 12/11/2021)  Subjective:      Patient ID: Azucena Son is a 42 y o  male  Chief Complaint   Patient presents with    Follow-up     6 month f/u       Here for follow up  Not exercising  Weight  Up 20 pounds  bp up    Hypertension  This is a chronic problem  The current episode started more than 1 year ago  The problem is unchanged  The problem is controlled  There are no associated agents to hypertension  Risk factors for coronary artery disease include dyslipidemia  Past treatments include angiotensin blockers and calcium channel blockers  The current treatment provides mild improvement  There are no compliance problems  The following portions of the patient's history were reviewed and updated as appropriate: allergies, current medications, past family history, past medical history, past social history, past surgical history and problem list     Review of Systems   Constitutional: Negative  HENT: Negative  Eyes: Negative  Respiratory: Negative  Cardiovascular: Negative  Gastrointestinal: Negative  Endocrine: Negative  Genitourinary: Negative  Musculoskeletal: Negative  Skin: Positive for rash (elbows peeling)  Allergic/Immunologic: Negative  Neurological: Negative  Hematological: Negative  Psychiatric/Behavioral: Negative  Current Outpatient Medications   Medication Sig Dispense Refill    amLODIPine (NORVASC) 10 mg tablet Take 1 tablet (10 mg total) by mouth daily 90 tablet 3    losartan-hydrochlorothiazide (HYZAAR) 100-25 MG per tablet Take 1 tablet by mouth daily 90 tablet 3    Multiple Vitamins-Minerals (multivitamin with minerals) tablet Take 1 tablet by mouth daily      omeprazole (PriLOSEC) 40 MG capsule Take 1 capsule (40 mg total) by mouth daily 90 capsule 3     No current facility-administered medications for this visit  Objective:    BP (!) 144/102   Pulse 75   Temp 97 9 °F (36 6 °C) (Tympanic)   Resp 20   Ht 5' 11" (1 803 m)   Wt (!) 152 kg (334 lb 6 4 oz)   SpO2 98%   BMI 46 64 kg/m²        Physical Exam  Vitals and nursing note reviewed  Constitutional:       Appearance: Normal appearance  HENT:      Head: Normocephalic and atraumatic  Eyes:      Extraocular Movements: Extraocular movements intact  Pupils: Pupils are equal, round, and reactive to light  Cardiovascular:      Rate and Rhythm: Normal rate and regular rhythm  Pulses: Normal pulses  Heart sounds: Normal heart sounds  Pulmonary:      Effort: Pulmonary effort is normal       Breath sounds: Normal breath sounds  Abdominal:      General: Abdomen is flat  Palpations: Abdomen is soft  Musculoskeletal:         General: Normal range of motion  Cervical back: Normal range of motion and neck supple  Skin:     Capillary Refill: Capillary refill takes less than 2 seconds  Neurological:      General: No focal deficit present  Mental Status: He is alert and oriented to person, place, and time     Psychiatric:         Mood and Affect: Mood normal          Behavior: Behavior normal          Thought Content:  Thought content normal          Judgment: Judgment normal                 Carine Kerr,

## 2021-08-25 ENCOUNTER — CLINICAL SUPPORT (OUTPATIENT)
Dept: FAMILY MEDICINE CLINIC | Facility: CLINIC | Age: 41
End: 2021-08-25

## 2021-08-25 VITALS — SYSTOLIC BLOOD PRESSURE: 152 MMHG | DIASTOLIC BLOOD PRESSURE: 98 MMHG

## 2021-08-25 DIAGNOSIS — I10 ESSENTIAL HYPERTENSION: Primary | ICD-10-CM

## 2021-08-25 PROCEDURE — RECHECK

## 2021-08-25 NOTE — PROGRESS NOTES
Assessment/Plan:    No problem-specific Assessment & Plan notes found for this encounter  Diagnoses and all orders for this visit:    Essential hypertension          Subjective:      Patient ID: Sharla Gee is a 42 y o  male      HPI        Review of Systems      Objective:      /98 (BP Location: Left arm)          Physical Exam

## 2021-09-08 ENCOUNTER — CLINICAL SUPPORT (OUTPATIENT)
Dept: FAMILY MEDICINE CLINIC | Facility: CLINIC | Age: 41
End: 2021-09-08

## 2021-09-08 VITALS — SYSTOLIC BLOOD PRESSURE: 136 MMHG | DIASTOLIC BLOOD PRESSURE: 98 MMHG

## 2021-09-08 DIAGNOSIS — Z01.30 BP CHECK: Primary | ICD-10-CM

## 2021-09-08 DIAGNOSIS — I10 ESSENTIAL HYPERTENSION: ICD-10-CM

## 2021-09-08 PROCEDURE — RECHECK

## 2021-09-08 RX ORDER — VALSARTAN AND HYDROCHLOROTHIAZIDE 160; 12.5 MG/1; MG/1
2 TABLET, FILM COATED ORAL DAILY
Qty: 30 TABLET | Refills: 1 | Status: SHIPPED | OUTPATIENT
Start: 2021-09-08 | End: 2021-10-11

## 2021-09-08 RX ORDER — AMLODIPINE BESYLATE 10 MG/1
5 TABLET ORAL DAILY
Qty: 90 TABLET | Refills: 3 | Status: SHIPPED | OUTPATIENT
Start: 2021-09-08 | End: 2022-01-06

## 2021-09-08 NOTE — PROGRESS NOTES
Assessment/Plan:    No problem-specific Assessment & Plan notes found for this encounter  Diagnoses and all orders for this visit:    BP check          Subjective:      Patient ID: Samuel Shetty is a 42 y o  male  HPI        Review of Systems      Objective:      /98      PT BP was 136/98  He stated that his ankles and legs were getting swollen,      Spoke with Dr Sheree Hooks, she doubled his Diovan-HCT  Also he lowered his Amlodpine from 10 mg to 5 mg      Dr Sheree Hooks would like to have him come back in 2 weeks for a new BP check              Physical Exam

## 2021-09-23 ENCOUNTER — CLINICAL SUPPORT (OUTPATIENT)
Dept: FAMILY MEDICINE CLINIC | Facility: CLINIC | Age: 41
End: 2021-09-23

## 2021-09-23 VITALS — DIASTOLIC BLOOD PRESSURE: 86 MMHG | SYSTOLIC BLOOD PRESSURE: 130 MMHG

## 2021-09-23 DIAGNOSIS — Z01.30 BP CHECK: Primary | ICD-10-CM

## 2021-09-23 NOTE — PROGRESS NOTES
Assessment/Plan:    No problem-specific Assessment & Plan notes found for this encounter  There are no diagnoses linked to this encounter  Subjective:      Patient ID: Coretta Tabor is a 42 y o  male  HPI        Review of Systems      Objective:      /86      BP is good to f/u with Dr Naomi Valentino in Dec  Stay on same medication and if any changes please call            Physical Exam

## 2021-11-22 DIAGNOSIS — I10 ESSENTIAL HYPERTENSION: ICD-10-CM

## 2021-11-22 RX ORDER — VALSARTAN AND HYDROCHLOROTHIAZIDE 160; 12.5 MG/1; MG/1
1 TABLET, FILM COATED ORAL DAILY
Qty: 90 TABLET | Refills: 3 | Status: SHIPPED | OUTPATIENT
Start: 2021-11-22 | End: 2022-01-06

## 2021-12-14 ENCOUNTER — OFFICE VISIT (OUTPATIENT)
Dept: FAMILY MEDICINE CLINIC | Facility: CLINIC | Age: 41
End: 2021-12-14
Payer: COMMERCIAL

## 2021-12-14 ENCOUNTER — APPOINTMENT (OUTPATIENT)
Dept: LAB | Facility: CLINIC | Age: 41
End: 2021-12-14
Payer: COMMERCIAL

## 2021-12-14 VITALS
WEIGHT: 315 LBS | DIASTOLIC BLOOD PRESSURE: 100 MMHG | HEIGHT: 71 IN | TEMPERATURE: 97.8 F | HEART RATE: 91 BPM | BODY MASS INDEX: 44.1 KG/M2 | OXYGEN SATURATION: 98 % | RESPIRATION RATE: 18 BRPM | SYSTOLIC BLOOD PRESSURE: 152 MMHG

## 2021-12-14 DIAGNOSIS — I10 PRIMARY HYPERTENSION: Primary | ICD-10-CM

## 2021-12-14 DIAGNOSIS — R73.03 PREDIABETES: ICD-10-CM

## 2021-12-14 DIAGNOSIS — K21.9 GASTROESOPHAGEAL REFLUX DISEASE, UNSPECIFIED WHETHER ESOPHAGITIS PRESENT: ICD-10-CM

## 2021-12-14 DIAGNOSIS — E78.2 MIXED HYPERLIPIDEMIA: ICD-10-CM

## 2021-12-14 DIAGNOSIS — Z23 NEED FOR VACCINATION: ICD-10-CM

## 2021-12-14 DIAGNOSIS — G47.33 OBSTRUCTIVE SLEEP APNEA: ICD-10-CM

## 2021-12-14 DIAGNOSIS — E66.01 MORBID OBESITY WITH BMI OF 45.0-49.9, ADULT (HCC): ICD-10-CM

## 2021-12-14 DIAGNOSIS — I10 ESSENTIAL HYPERTENSION: ICD-10-CM

## 2021-12-14 LAB
ALBUMIN SERPL BCP-MCNC: 3.4 G/DL (ref 3.5–5)
ALP SERPL-CCNC: 66 U/L (ref 46–116)
ALT SERPL W P-5'-P-CCNC: 32 U/L (ref 12–78)
ANION GAP SERPL CALCULATED.3IONS-SCNC: 9 MMOL/L (ref 4–13)
AST SERPL W P-5'-P-CCNC: 18 U/L (ref 5–45)
BILIRUB SERPL-MCNC: 0.48 MG/DL (ref 0.2–1)
BUN SERPL-MCNC: 15 MG/DL (ref 5–25)
CALCIUM ALBUM COR SERPL-MCNC: 9.5 MG/DL (ref 8.3–10.1)
CALCIUM SERPL-MCNC: 9 MG/DL (ref 8.3–10.1)
CHLORIDE SERPL-SCNC: 103 MMOL/L (ref 100–108)
CHOLEST SERPL-MCNC: 201 MG/DL
CO2 SERPL-SCNC: 29 MMOL/L (ref 21–32)
CREAT SERPL-MCNC: 1.29 MG/DL (ref 0.6–1.3)
EST. AVERAGE GLUCOSE BLD GHB EST-MCNC: 120 MG/DL
GFR SERPL CREATININE-BSD FRML MDRD: 79 ML/MIN/1.73SQ M
GLUCOSE P FAST SERPL-MCNC: 110 MG/DL (ref 65–99)
HBA1C MFR BLD: 5.8 %
HDLC SERPL-MCNC: 42 MG/DL
LDLC SERPL CALC-MCNC: 123 MG/DL (ref 0–100)
POTASSIUM SERPL-SCNC: 3.9 MMOL/L (ref 3.5–5.3)
PROT SERPL-MCNC: 7.6 G/DL (ref 6.4–8.2)
SODIUM SERPL-SCNC: 141 MMOL/L (ref 136–145)
TRIGL SERPL-MCNC: 180 MG/DL
TSH SERPL DL<=0.05 MIU/L-ACNC: 1.38 UIU/ML (ref 0.36–3.74)

## 2021-12-14 PROCEDURE — 3080F DIAST BP >= 90 MM HG: CPT | Performed by: FAMILY MEDICINE

## 2021-12-14 PROCEDURE — 99214 OFFICE O/P EST MOD 30 MIN: CPT | Performed by: FAMILY MEDICINE

## 2021-12-14 PROCEDURE — 3725F SCREEN DEPRESSION PERFORMED: CPT | Performed by: FAMILY MEDICINE

## 2021-12-14 PROCEDURE — 1036F TOBACCO NON-USER: CPT | Performed by: FAMILY MEDICINE

## 2021-12-14 PROCEDURE — 3008F BODY MASS INDEX DOCD: CPT | Performed by: FAMILY MEDICINE

## 2021-12-14 PROCEDURE — 80053 COMPREHEN METABOLIC PANEL: CPT

## 2021-12-14 PROCEDURE — 90471 IMMUNIZATION ADMIN: CPT

## 2021-12-14 PROCEDURE — 83036 HEMOGLOBIN GLYCOSYLATED A1C: CPT

## 2021-12-14 PROCEDURE — 80061 LIPID PANEL: CPT

## 2021-12-14 PROCEDURE — 84443 ASSAY THYROID STIM HORMONE: CPT

## 2021-12-14 PROCEDURE — 90686 IIV4 VACC NO PRSV 0.5 ML IM: CPT

## 2021-12-14 PROCEDURE — 3077F SYST BP >= 140 MM HG: CPT | Performed by: FAMILY MEDICINE

## 2021-12-14 PROCEDURE — 36415 COLL VENOUS BLD VENIPUNCTURE: CPT

## 2021-12-16 ENCOUNTER — IMMUNIZATIONS (OUTPATIENT)
Dept: FAMILY MEDICINE CLINIC | Facility: HOSPITAL | Age: 41
End: 2021-12-16

## 2021-12-16 DIAGNOSIS — Z23 ENCOUNTER FOR IMMUNIZATION: Primary | ICD-10-CM

## 2021-12-16 PROCEDURE — 0001A COVID-19 PFIZER VACC 0.3 ML: CPT

## 2021-12-16 PROCEDURE — 91300 COVID-19 PFIZER VACC 0.3 ML: CPT

## 2022-01-06 ENCOUNTER — OFFICE VISIT (OUTPATIENT)
Dept: FAMILY MEDICINE CLINIC | Facility: CLINIC | Age: 42
End: 2022-01-06
Payer: COMMERCIAL

## 2022-01-06 VITALS
HEART RATE: 89 BPM | WEIGHT: 315 LBS | OXYGEN SATURATION: 98 % | HEIGHT: 71 IN | DIASTOLIC BLOOD PRESSURE: 100 MMHG | RESPIRATION RATE: 14 BRPM | SYSTOLIC BLOOD PRESSURE: 160 MMHG | TEMPERATURE: 97.6 F | BODY MASS INDEX: 44.1 KG/M2

## 2022-01-06 DIAGNOSIS — I10 ESSENTIAL HYPERTENSION: ICD-10-CM

## 2022-01-06 DIAGNOSIS — K21.9 GASTROESOPHAGEAL REFLUX DISEASE, UNSPECIFIED WHETHER ESOPHAGITIS PRESENT: Primary | ICD-10-CM

## 2022-01-06 PROCEDURE — 99213 OFFICE O/P EST LOW 20 MIN: CPT | Performed by: FAMILY MEDICINE

## 2022-01-06 PROCEDURE — 3008F BODY MASS INDEX DOCD: CPT | Performed by: EMERGENCY MEDICINE

## 2022-01-06 RX ORDER — AMLODIPINE BESYLATE 5 MG/1
5 TABLET ORAL DAILY
Qty: 90 TABLET | Refills: 3
Start: 2022-01-06 | End: 2022-03-17

## 2022-01-06 RX ORDER — VALSARTAN AND HYDROCHLOROTHIAZIDE 320; 25 MG/1; MG/1
1 TABLET, FILM COATED ORAL DAILY
Qty: 90 TABLET | Refills: 3 | Status: SHIPPED | OUTPATIENT
Start: 2022-01-06

## 2022-01-06 NOTE — PROGRESS NOTES
Assessment/Plan:    1  Gastroesophageal reflux disease, unspecified whether esophagitis present  Assessment & Plan:  Last edg was 2011  Should have repeat    Orders:  -     Ambulatory referral to Gastroenterology; Future    2  Essential hypertension  -     valsartan-hydrochlorothiazide (DIOVAN-HCT) 320-25 MG per tablet; Take 1 tablet by mouth daily  -     amLODIPine (NORVASC) 5 mg tablet; Take 1 tablet (5 mg total) by mouth daily    BMI Counseling: Body mass index is 48 37 kg/m²  The BMI is above normal  Nutrition recommendations include encouraging healthy choices of fruits and vegetables  Exercise recommendations include exercising 3-5 times per week  No pharmacotherapy was ordered  Rationale for BMI follow-up plan is due to patient being overweight or obese  There are no Patient Instructions on file for this visit  No follow-ups on file  Subjective:      Patient ID: Devora Stock is a 42 y o  male  Chief Complaint   Patient presents with    Hypertension     patient being seen for hypertension       Here for follow up bp  Not taking his BP at home  Trying to change diet    Hypertension  This is a chronic problem  The current episode started more than 1 year ago  The problem is unchanged  The problem is uncontrolled  Pertinent negatives include no anxiety, blurred vision or chest pain  There are no associated agents to hypertension  Past treatments include angiotensin blockers  The current treatment provides significant improvement  There are no compliance problems  The following portions of the patient's history were reviewed and updated as appropriate: allergies, current medications, past family history, past medical history, past social history, past surgical history and problem list     Review of Systems   Constitutional: Negative  HENT: Negative  Eyes: Negative  Negative for blurred vision  Respiratory: Negative  Cardiovascular: Negative for chest pain  Gastrointestinal: Negative  Endocrine: Negative  Genitourinary: Negative  Musculoskeletal: Negative  Allergic/Immunologic: Negative  Neurological: Negative  Hematological: Negative  Psychiatric/Behavioral: Negative  Current Outpatient Medications   Medication Sig Dispense Refill    amLODIPine (NORVASC) 5 mg tablet Take 1 tablet (5 mg total) by mouth daily 90 tablet 3    Multiple Vitamins-Minerals (multivitamin with minerals) tablet Take 1 tablet by mouth daily      omeprazole (PriLOSEC) 40 MG capsule Take 1 capsule (40 mg total) by mouth daily 90 capsule 3    valsartan-hydrochlorothiazide (DIOVAN-HCT) 320-25 MG per tablet Take 1 tablet by mouth daily 90 tablet 3     No current facility-administered medications for this visit  Objective:    /100 (BP Location: Left arm, Patient Position: Sitting, Cuff Size: Large)   Pulse 89   Temp 97 6 °F (36 4 °C) (Tympanic)   Resp 14   Ht 5' 11" (1 803 m)   Wt (!) 157 kg (346 lb 12 8 oz)   SpO2 98%   BMI 48 37 kg/m²        Physical Exam  Vitals and nursing note reviewed  Constitutional:       Appearance: Normal appearance  HENT:      Head: Normocephalic and atraumatic  Eyes:      Extraocular Movements: Extraocular movements intact  Pupils: Pupils are equal, round, and reactive to light  Cardiovascular:      Rate and Rhythm: Normal rate and regular rhythm  Pulses: Normal pulses  Heart sounds: Normal heart sounds  Pulmonary:      Effort: Pulmonary effort is normal       Breath sounds: Normal breath sounds  Abdominal:      General: Abdomen is flat  Palpations: Abdomen is soft  Musculoskeletal:      Cervical back: Normal range of motion and neck supple  Skin:     General: Skin is warm  Capillary Refill: Capillary refill takes less than 2 seconds  Neurological:      General: No focal deficit present  Mental Status: He is alert and oriented to person, place, and time     Psychiatric: Mood and Affect: Mood normal          Behavior: Behavior normal          Thought Content:  Thought content normal          Judgment: Judgment normal                 Hoa Brooke, DO

## 2022-01-20 ENCOUNTER — CLINICAL SUPPORT (OUTPATIENT)
Dept: FAMILY MEDICINE CLINIC | Facility: CLINIC | Age: 42
End: 2022-01-20

## 2022-01-20 VITALS — DIASTOLIC BLOOD PRESSURE: 88 MMHG | SYSTOLIC BLOOD PRESSURE: 130 MMHG | TEMPERATURE: 96.5 F

## 2022-01-20 DIAGNOSIS — I10 HYPERTENSION, UNSPECIFIED TYPE: Primary | ICD-10-CM

## 2022-01-20 NOTE — PROGRESS NOTES
Assessment/Plan:    No problem-specific Assessment & Plan notes found for this encounter  There are no diagnoses linked to this encounter  Subjective:      Patient ID: Luis M Myers is a 42 y o  male  HPI    Patient here for BP Check    Review of Systems      Objective: There were no vitals taken for this visit           Physical Exam

## 2022-01-22 ENCOUNTER — APPOINTMENT (OUTPATIENT)
Dept: RADIOLOGY | Age: 42
End: 2022-01-22
Payer: COMMERCIAL

## 2022-01-22 ENCOUNTER — OFFICE VISIT (OUTPATIENT)
Dept: URGENT CARE | Age: 42
End: 2022-01-22
Payer: COMMERCIAL

## 2022-01-22 VITALS
BODY MASS INDEX: 44.1 KG/M2 | TEMPERATURE: 71 F | SYSTOLIC BLOOD PRESSURE: 134 MMHG | WEIGHT: 315 LBS | HEIGHT: 71 IN | DIASTOLIC BLOOD PRESSURE: 69 MMHG | HEART RATE: 71 BPM

## 2022-01-22 DIAGNOSIS — S99.911A ANKLE INJURY, RIGHT, INITIAL ENCOUNTER: ICD-10-CM

## 2022-01-22 DIAGNOSIS — S99.921A INJURY OF RIGHT FOOT, INITIAL ENCOUNTER: Primary | ICD-10-CM

## 2022-01-22 PROCEDURE — 73630 X-RAY EXAM OF FOOT: CPT

## 2022-01-22 PROCEDURE — G0382 LEV 3 HOSP TYPE B ED VISIT: HCPCS

## 2022-01-22 PROCEDURE — 73610 X-RAY EXAM OF ANKLE: CPT

## 2022-01-22 NOTE — PATIENT INSTRUCTIONS
Foot Fracture in Adults   AMBULATORY CARE:   A foot fracture  is a break in a bone in your foot  Common signs and symptoms:   · Tenderness over the injured area    · Foot pain that increases when you try to stand or walk    · Numbness in your foot or toes    · Cracking sounds when you move your foot    · Swelling, bruising, blistering, or open skin breaks    · Trouble moving your foot or walking    · Foot shape that is not normal    Call your local emergency number (911 in the [address]) if:   · You suddenly feel lightheaded and short of breath  · You have chest pain when you take a deep breath or cough  · You cough up blood  Seek care immediately if:   · The pain in your injured foot gets worse even after you rest and take pain medicine  · The skin or toes of your foot become numb, swollen, cold, white, or blue  · You have more pain or swelling than you did before a cast was put on  · Your leg feels warm, tender, and painful  It may look swollen and red  Call your doctor if:   · You have a fever  · You have new sores around your boot, cast, or splint  · You have new or worsening trouble moving your foot  · You notice a foul smell coming from under your cast     · Your boot, cast, or splint gets damaged  · You have questions or concerns about your condition or care  Treatment  depends on the kind of fracture you have and how bad it is  You may need any of the following:  · A boot, cast, or splint  may be put on your foot and lower leg to decrease your foot movement  These work to hold the broken bones in place, decrease pain, and prevent more damage to your foot  · Medicines  may be given to prevent or treat pain or a bacterial infection  You may also need a vaccine to prevent tetanus if bone broke through the skin  A tetanus shot is given if you have not had a booster in the past 5 to 10 years  · Surgery  may be used to put your bones back into the correct position   Wires, pins, plates, or screws may be used to keep the broken pieces lined up correctly and hold them together  Self-care:   · Rest  your foot and avoid activities that cause pain  · Apply ice  to decrease swelling and pain, and to prevent tissue damage  Use an ice pack, or put crushed ice in a plastic bag  Cover it with a towel before you apply it  Use ice for 15 to 20 minutes every hour or as directed  · Elevate your foot  above the level of your heart as often as you can  This will help decrease swelling and pain  Prop your foot on pillows or blankets to keep it elevated comfortably  · Physical therapy  may be needed when your foot has healed  A physical therapist can teach you exercises to help improve movement and strength, and to decrease pain  Cast or splint care:   · Ask when it is okay to take a bath or shower  Do not let your cast or splint get wet  Before you bathe, cover the cast or splint with a plastic bag  Tape the bag to your skin above the splint to seal out water  Keep your foot out of the water in case the bag leaks  · Check the skin around your splint daily for any redness or open areas  · Do not use a sharp or pointed object to scratch your skin under the splint  · Do not remove your splint unless your healthcare provider or orthopedic surgeon says it is okay  Assistive devices: You may be given a hard-soled shoe to wear while your foot is healing  You also may need to use crutches to help you walk while your foot heals  It is important to use your crutches correctly  Ask for more information about how to use crutches  Follow up with your doctor or bone specialist as directed: You may need to return to have your splint or stitches removed  You may also need to return for tests to make sure your foot is healing  Write down your questions so you remember to ask them during your visits    © Copyright [employer] 2021 Information is for End User's use only and may not be sold, redistributed or otherwise used for commercial purposes  All illustrations and images included in CareNotes® are the copyrighted property of A [employer] M [employer]  or Zena Singh  The above information is an  only  It is not intended as medical advice for individual conditions or treatments  Talk to your doctor, nurse or pharmacist before following any medical regimen to see if it is safe and effective for you

## 2022-01-22 NOTE — PROGRESS NOTES
[facility]        NAME: Rochelle Kay is a 42 y o  male  : 1980    MRN: [id number]  DATE: 2022  TIME: 6:19 PM    Assessment and Plan   Injury of right foot, initial encounter [T97 [id number]]  1  Injury of right foot, initial encounter  Ambulatory Referral to Orthopedic Surgery   2  Ankle injury, right, initial encounter  XR ankle 3+ vw right    XR foot 3+ vw right    CANCELED: XR foot 3+ vw right         Patient Instructions       Follow up with PCP in 3-5 days  Proceed to  ER if symptoms worsen  Chief Complaint     Chief Complaint   Patient presents with    Ankle Injury     Pt fell from the stairs          History of Present Illness       Patient presenting with right foot pain that started today  Patient states that approximately 230 this afternoon he fell down the steps  He noted that he immediately had foot pain and swelling  He denies any numbness tingling at this time  He states that he is able to bear weight on it, but is very painful  He denies any previous right ankle or foot injuries at this time  Review of Systems   Review of Systems   Constitutional: Negative for chills and fever  HENT: Negative for ear pain and sore throat  Eyes: Negative for pain and visual disturbance  Respiratory: Negative for cough and shortness of breath  Cardiovascular: Negative for chest pain and palpitations  Gastrointestinal: Negative for abdominal pain and vomiting  Genitourinary: Negative for dysuria and hematuria  Musculoskeletal: Positive for arthralgias and joint swelling  Negative for back pain  Skin: Negative for color change and rash  Neurological: Negative for seizures and syncope  All other systems reviewed and are negative          Current Medications       Current Outpatient Medications:     amLODIPine (NORVASC) 5 mg tablet, Take 1 tablet (5 mg total) by mouth daily, Disp: 90 tablet, Rfl: 3    Multiple Vitamins-Minerals (multivitamin with minerals) tablet, Take 1 tablet by mouth daily, Disp: , Rfl:     omeprazole (PriLOSEC) 40 MG capsule, Take 1 capsule (40 mg total) by mouth daily, Disp: 90 capsule, Rfl: 3    valsartan-hydrochlorothiazide (DIOVAN-HCT) 320-25 MG per tablet, Take 1 tablet by mouth daily, Disp: 90 tablet, Rfl: 3    Current Allergies     Allergies as of 01/22/2022 - Reviewed 01/06/2022   Allergen Reaction Noted    Ace inhibitors Cough 09/20/2012            The following portions of the patient's history were reviewed and updated as appropriate: allergies, current medications, past family history, past medical history, past social history, past surgical history and problem list      Past Medical History:   Diagnosis Date    Chronic low back pain     Last Assessed: 2/10/2016     GERD (gastroesophageal reflux disease)     Hypertension     Lump of skin     Last Assessed: 2/10/2016     Obesity 5/22/2013    Sleep apnea     Somatic dysfunction of lumbar region     Last Assessed: 2/18/2016     Whiplash injury to neck     Last Assessed: 2/18/2016        Past Surgical History:   Procedure Laterality Date    APPENDECTOMY      REDUCTION OF TORSION OF TESTIS  1994    SURGERY SCROTAL / TESTICULAR      WISDOM TOOTH EXTRACTION         Family History   Problem Relation Age of Onset   Kenroy Pert Glaucoma Mother     Hypertension Mother     Hypertension Father     Stroke Maternal Grandmother     Heart disease Neg Hx     Diabetes Neg Hx     Thyroid disease Neg Hx     Cancer Neg Hx          Medications have been verified  Objective   /69   Pulse 71   Temp (!) 71 °F (21 7 °C)   Ht 5' 11" (1 803 m)   Wt (!) 157 kg (346 lb)   BMI 48 26 kg/m²        Physical Exam     Physical Exam  Vitals and nursing note reviewed  Constitutional:       General: He is not in acute distress  Appearance: Normal appearance  He is not ill-appearing  HENT:      Head: Normocephalic and atraumatic        Right Ear: Tympanic membrane normal       Left Ear: Tympanic membrane normal       Nose: Nose normal  No congestion or rhinorrhea  Mouth/Throat:      Mouth: Mucous membranes are moist       Pharynx: Oropharynx is clear  No oropharyngeal exudate or posterior oropharyngeal erythema  Eyes:      Extraocular Movements: Extraocular movements intact  Conjunctiva/sclera: Conjunctivae normal       Pupils: Pupils are equal, round, and reactive to light  Cardiovascular:      Rate and Rhythm: Normal rate and regular rhythm  Pulses: Normal pulses  Heart sounds: Normal heart sounds  No murmur heard  No friction rub  No gallop  Pulmonary:      Effort: Pulmonary effort is normal  No respiratory distress  Breath sounds: Normal breath sounds  No stridor  No wheezing, rhonchi or rales  Abdominal:      General: Bowel sounds are normal       Palpations: Abdomen is soft  Tenderness: There is no abdominal tenderness  Musculoskeletal:         General: Swelling, tenderness and signs of injury present  Cervical back: Normal range of motion and neck supple  Right foot: Normal       Left foot: Decreased range of motion  Normal capillary refill  Swelling and bony tenderness present  Normal pulse  Legs:    Skin:     General: Skin is warm and dry  Capillary Refill: Capillary refill takes less than 2 seconds  Neurological:      General: No focal deficit present  Mental Status: He is alert and oriented to person, place, and time  Psychiatric:         Mood and Affect: Mood normal          Behavior: Behavior normal      X-ray of right foot and ankle reviewed  Possible abnormality noted to right talus  Awaiting final read per radiology department

## 2022-01-26 ENCOUNTER — OFFICE VISIT (OUTPATIENT)
Dept: OBGYN CLINIC | Facility: MEDICAL CENTER | Age: 42
End: 2022-01-26
Payer: COMMERCIAL

## 2022-01-26 DIAGNOSIS — S92.151A CLOSED DISPLACED AVULSION FRACTURE OF RIGHT TALUS, INITIAL ENCOUNTER: Primary | ICD-10-CM

## 2022-01-26 DIAGNOSIS — S99.921A INJURY OF RIGHT FOOT, INITIAL ENCOUNTER: ICD-10-CM

## 2022-01-26 DIAGNOSIS — S93.491A SPRAIN OF ANTERIOR TALOFIBULAR LIGAMENT OF RIGHT ANKLE, INITIAL ENCOUNTER: ICD-10-CM

## 2022-01-26 DIAGNOSIS — S92.251A CLOSED AVULSION FRACTURE OF NAVICULAR BONE OF RIGHT FOOT, INITIAL ENCOUNTER: ICD-10-CM

## 2022-01-26 PROCEDURE — 1036F TOBACCO NON-USER: CPT | Performed by: EMERGENCY MEDICINE

## 2022-01-26 PROCEDURE — 99244 OFF/OP CNSLTJ NEW/EST MOD 40: CPT | Performed by: EMERGENCY MEDICINE

## 2022-01-26 NOTE — PATIENT INSTRUCTIONS
You may weight bear as tolerated in the walking boot, and may take it off when resting or sleeping  You can wean from the boot as you improve with symptoms  You may use Advil (ibuprofen) 600mg every 6 hours OR Aleve (naproxen) 250-500mg every 12 hours as needed for pain and inflammation  You may also take Tylenol 500mg every 4-6 hours as needed  Check with your primary care physician to see if these medications are safe to take and to make sure they do not interfere with your other medications and medical issues  Ankle Sprain   AMBULATORY CARE:   An ankle sprain  happens when 1 or more ligaments in your ankle joint stretch or tear  Ligaments are tough tissues that connect bones  Ligaments support your joints and keep your bones in place  Common symptoms include the following:   · Trouble moving your ankle or foot    · Pain when you touch or put weight on your ankle    · Bruised, swollen, or misshapen ankle  Seek care immediately if:   · You have severe pain in your ankle  · Your foot or toes are cold or numb  · Your ankle becomes more weak or unstable (wobbly)  · You are unable to put any weight on your ankle or foot  · Your swelling has increased or returned  Contact your healthcare provider if:   · Your pain does not go away, even after treatment  · You have questions or concerns about your condition or care  Treatment:   · Medicines      ¨ NSAIDs , such as ibuprofen, help decrease swelling, pain, and fever  This medicine is available with or without a doctor's order  NSAIDs can cause stomach bleeding or kidney problems in certain people  If you take blood thinner medicine, always ask your healthcare provider if NSAIDs are safe for you  Always read the medicine label and follow directions  ¨ Acetaminophen  decreases pain  It is available without a doctor's order  Ask how much to take and how often to take it  Follow directions   Acetaminophen can cause liver damage if not taken correctly  ¨ Prescription pain medicine  may be given  Ask how to take this medicine safely  · Surgery  may be needed to repair or replace a torn ligament if your sprain does not heal with other treatments  Your healthcare provider may use screws to attach the bones in your ankle together  The screws may help support your ankle and make it stable  Ask your healthcare provider for more information about surgery to treat your ankle sprain  Self care:   · Use support devices,  such as a brace, cast, or splint, may be needed to limit your movement and protect your joint  You may need to use crutches to decrease your pain as you move around  · Go to physical therapy as directed  A physical therapist teaches you exercises to help improve movement and strength, and to decrease pain  · Rest  your ankle so that it can heal  Return to normal activities as directed  · Apply ice on your ankle for 15 to 20 minutes every hour or as directed  Use an ice pack, or put crushed ice in a plastic bag  Cover it with a towel  Ice helps prevent tissue damage and decreases swelling and pain  · Compress  your ankle  Ask if you should wrap an elastic bandage around your injured ligament  An elastic bandage provides support and helps decrease swelling and movement so your joint can heal  Wear as long as directed  · Elevate  your ankle above the level of your heart as often as you can  This will help decrease swelling and pain  Prop your ankle on pillows or blankets to keep it elevated comfortably  Prevent another ankle sprain:   · Let your ankle heal   Find out how long your ligament needs to heal  Do not do any physical activity until your healthcare provider says it is okay  If you start activity too soon, you may develop a more serious injury  · Always warm up and stretch  before you exercise or play sports  · Use the right equipment    Always wear shoes that fit well and are made for the activity that you are doing  You may also need ankle supports, elbow and knee pads, or braces  Follow up with your healthcare provider as directed:  Write down your questions so you remember to ask them during your visits  © 2017 [employer] Information is for End User's use only and may not be sold, redistributed or otherwise used for commercial purposes  All illustrations and images included in CareNotes® are the copyrighted property of [employer]  or [employer]  The above information is an  only  It is not intended as medical advice for individual conditions or treatments  Talk to your doctor, nurse or pharmacist before following any medical regimen to see if it is safe and effective for you  Ankle Exercises   AMBULATORY CARE:   What you need to know about ankle exercises: Ankle exercises help strengthen your ankle and improve its function after injury  These are beginning exercises  Ask your healthcare provider if you need to see a physical therapist for more advanced exercises  · Do these exercises 3 to 5 days a week , or as directed by your healthcare provider  Ask if you should perform the exercises on each ankle  · Do the exercises in the order that your healthcare provider recommends  This will help prevent swelling, chronic pain, and reinjury  Start with range of motion exercises  Then progress to strengthening exercises, and finally to balancing exercises  · Warm up before you do ankle exercises  Walk or ride a stationary bike for 5 to 10 minutes to prepare your ankle for movement  · Stop if you feel pain  It is normal to feel some discomfort at first  Regular exercise will help decrease your discomfort over time  How to perform range of motion exercises safely:  Begin with range of motion exercises to improve flexibility  Ask your healthcare provider when you can progress to strengthening exercises    · Ankle alphabet:  Sit on a chair so that your feet do not touch the floor  Use your big toe to write each letter of the alphabet  Use only your foot and ankle, and keep your movements small  Do 2 sets  · Calf stretches:      ¨ Sitting calf stretches with a towel:  Sit on the floor with both legs out straight in front of you  Loop a towel around the ball of your injured foot  Grasp the ends of the towel and pull it toward you  Keep your leg and back straight  Do not lean forward as you pull the towel  Hold for 30 seconds  Then relax for 30 seconds  Do 2 sets of 10  ¨ Standing calf stretches:  Stand facing a wall with the foot that is not injured forward and your knee slightly bent  Keep the leg with the injured foot straight and behind you with your toes pointed in slightly  With both heels flat on the floor, press your hips forward  Do not arch your back  Hold for 30 seconds, and then relax for 30 seconds  Do 2 sets of 10  Repeat with your leg bent  Do 2 sets of 10  How to perform strengthening exercises safely:  After you can perform range of motion exercises without pain, you may begin strengthening exercises  Ask your healthcare provider when you can progress to balancing exercises  · Ankle movement in 4 directions:  Sit on the floor with your legs straight in front of you  Keep your heels on the floor for support  ¨ Dorsiflexion:  Begin with your toes pointing straight up  Pull your toes toward your body  Slowly return to the starting position  Do 3 sets of 5      ¨ Plantar flexion:  Begin with your toes pointing straight up  Push your toes away from your body  Slowly return to the starting position  Do 3 sets of 5            ¨ Inversion:  Begin with your toes pointing straight up  Push your toes inward, toward each other  Slowly return to the starting position  Do 3 sets of 5      ¨ Eversion:  Begin with your toes pointing straight up  Push your toes outward, away from each other  Slowly return to the starting position   Do 3 sets of 5  · Toe curls with a towel:  Sit on a chair so that both of your feet are flat on the floor  Place a small towel on the floor in front of your injured foot  Grab the center of the towel with your toes and curl the towel toward you  Relax and repeat  Do 1 set of 5            · Mascot pick-ups:  Sit on a chair so that both of your feet are flat on the floor  Place 20 marbles on the floor in front of your injured foot  Use your toes to  one marble at a time and place it into a bowl  Repeat until you have picked up all the marbles  Do 1 set  · Heel raises:      ¨ Single leg heel raises:  Stand with your weight evenly on both feet  Hold on to a chair or a wall for balance  Lift the foot that is not injured off the floor so all your weight is placed on your injured foot  Raise the heel of your injured foot as high as you can  Slowly lower your heel to the floor  Do 1 set of 10  ¨ Double leg heel raises:  Stand with your weight evenly on both feet  Hold on to a chair or a wall for balance  Raise both of your heels as high as you can  Slowly lower your heels to the floor  Do 1 set of 10  · Heel and toe walks:      ¨ Heel walks:  Begin in a standing position  Lift your toes off the floor and walk on your heels  Keep your toes lifted as high as possible  Do 2 sets of 10  ¨ Toe walks:  Begin in a standing position  Lift your heels off the floor and walk on the balls and toes of your feet  Keep your heels lifted as high as possible  Do 2 sets of 10  How to perform a balance exercise safely:  After you can perform strengthening exercises without pain, you may do this beginning balancing exercise  Ask your healthcare provider for more advanced balance exercises  · Single leg stance:  Stand with your weight evenly on both feet, or hold on to a chair or a wall  Do not lean to the side  Lift the foot that is not injured off the floor so all your weight is placed on your injured foot  Balance on your injured foot  Ask your healthcare provider how long to hold this position  Contact your healthcare provider if:   · Your pain becomes worse  · You have new pain  · You have questions or concerns about your condition, care, or exercise program   © 2017 [employer] Information is for End User's use only and may not be sold, redistributed or otherwise used for commercial purposes  All illustrations and images included in CareNotes® are the copyrighted property of [employer]  or [employer]  The above information is an  only  It is not intended as medical advice for individual conditions or treatments  Talk to your doctor, nurse or pharmacist before following any medical regimen to see if it is safe and effective for you

## 2022-01-26 NOTE — PROGRESS NOTES
Assessment/Plan:    Diagnoses and all orders for this visit:    Closed displaced avulsion fracture of right talus, initial encounter    Closed avulsion fracture of navicular bone of right foot, initial encounter    Sprain of anterior talofibular ligament of right ankle, initial encounter    Injury of right foot, initial encounter  -     Ambulatory Referral to Orthopedic Surgery    Reviewed urgent care notes and Xrays  WBAT in boot, may wean as tolerated, provided ankle exercise instructions    Return for 2-3 weeks  Chief Complaint:   No chief complaint on file  Subjective:   Patient ID: Aurora Brown is a 42 y o  male  DOI 1/22/22  New patient presents for right ankle injury described as 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  Complains of lateral ankle pain was evaluated at the urgent care x-rays were obtained provided a Cam boot      Review of Systems    The following portions of the patient's chart were reviewed and updated as appropriate:    Allergy:    Allergies   Allergen Reactions    Ace Inhibitors Cough         Past Medical History:   Diagnosis Date    Chronic low back pain     Last Assessed: 2/10/2016     GERD (gastroesophageal reflux disease)     Hypertension     Lump of skin     Last Assessed: 2/10/2016     Obesity 5/22/2013    Sleep apnea     Somatic dysfunction of lumbar region     Last Assessed: 2/18/2016     Whiplash injury to neck     Last Assessed: 2/18/2016        Past Surgical History:   Procedure Laterality Date    APPENDECTOMY      REDUCTION OF TORSION OF TESTIS  1994    SURGERY SCROTAL / TESTICULAR      WISDOM TOOTH EXTRACTION         Social History     Socioeconomic History    Marital status: /Civil Union     Spouse name: Not on file    Number of children: Not on file    Years of education: Not on file    Highest education level: Not on file   Occupational History    Not on file   Tobacco Use    Smoking status: Never Smoker    Smokeless tobacco: Never Used    Tobacco comment: 1 cigar weekly/ Per allscripts: occasional tobacco smoker    Vaping Use    Vaping Use: Never used   Substance and Sexual Activity    Alcohol use: Yes     Comment: Per allscripts: Social alcohol use     Drug use: No    Sexual activity: Yes     Partners: Male   Other Topics Concern    Not on file   Social History Narrative    Not on file     Social Determinants of Health     Financial Resource Strain: Not on file   Food Insecurity: Not on file   Transportation Needs: Not on file   Physical Activity: Not on file   Stress: Not on file   Social Connections: Not on file   Intimate Partner Violence: Not on file   Housing Stability: Not on file       Family History   Problem Relation Age of Onset    Glaucoma Mother     Hypertension Mother     Hypertension Father     Stroke Maternal Grandmother     Heart disease Neg Hx     Diabetes Neg Hx     Thyroid disease Neg Hx     Cancer Neg Hx        Medications:    Current Outpatient Medications:     amLODIPine (NORVASC) 5 mg tablet, Take 1 tablet (5 mg total) by mouth daily, Disp: 90 tablet, Rfl: 3    Multiple Vitamins-Minerals (multivitamin with minerals) tablet, Take 1 tablet by mouth daily, Disp: , Rfl:     omeprazole (PriLOSEC) 40 MG capsule, Take 1 capsule (40 mg total) by mouth daily, Disp: 90 capsule, Rfl: 3    valsartan-hydrochlorothiazide (DIOVAN-HCT) 320-25 MG per tablet, Take 1 tablet by mouth daily, Disp: 90 tablet, Rfl: 3    Patient Active Problem List   Diagnosis    GERD (gastroesophageal reflux disease)    Hiatal hernia    Hypertension    Morbid obesity with BMI of 45 0-49 9, adult ([facility] [address] )    Obstructive sleep apnea    Well adult exam    Prediabetes    Annual physical exam    Encounter for sterilization    Muscle spasm of left shoulder    History of torsion of testis       Objective: There were no vitals taken for this visit      Right Ankle Exam     Tenderness   The patient is experiencing tenderness in the ATF (dorsal proximal foot with swelling)  Range of Motion   Dorsiflexion: abnormal   Eversion: abnormal   Inversion: abnormal     Tests   Varus tilt: positive    Other   Erythema: absent  Sensation: normal  Pulse: present     Comments: There is no tenderness to palpation of the lisfranc, and no plantar ecchymosis  There is no tenderness to palpation of the proximal and mid fibula  Physical Exam      Neurologic Exam    Procedures    I have personally reviewed pertinent films in PACS and my interpretation is   Xrays Right Foot and Ankle: There are small avulsion fractures of the dorsal talus and navicular bones with mild displacement  No OCD, ankle joint with no widening  Flattening of the 2nd and 3rd MT heads

## 2022-01-26 NOTE — LETTER
January 26, 2022     Lili Mitchell, [address] [place] [id number]    Patient: Minda Nicole   YOB: 1980   Date of Visit: 1/26/2022       Dear Dr Celina Lacy: Thank you for referring Kieradariana Gonzalez to me for evaluation  Below are my notes for this consultation  If you have questions, please do not hesitate to call me  I look forward to following your patient along with you  Sincerely,        Colby Willson MD        CC: No Recipients  Colby Willson MD  1/26/2022  9:05 AM  Signed      Assessment/Plan:    Diagnoses and all orders for this visit:    Closed displaced avulsion fracture of right talus, initial encounter    Closed avulsion fracture of navicular bone of right foot, initial encounter    Sprain of anterior talofibular ligament of right ankle, initial encounter    Injury of right foot, initial encounter  -     Ambulatory Referral to Orthopedic Surgery    Reviewed urgent care notes and Xrays  WBAT in boot, may wean as tolerated, provided ankle exercise instructions    Return for 2-3 weeks  Chief Complaint:   No chief complaint on file  Subjective:   Patient ID: Minda Nicole is a 42 y o  male  DOI 1/22/22  New patient presents for right ankle injury described as 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  Complains of lateral ankle pain was evaluated at the urgent care x-rays were obtained provided a Cam boot      Review of Systems    The following portions of the patient's chart were reviewed and updated as appropriate:    Allergy:    Allergies   Allergen Reactions    Ace Inhibitors Cough         Past Medical History:   Diagnosis Date    Chronic low back pain     Last Assessed: 2/10/2016     GERD (gastroesophageal reflux disease)     Hypertension     Lump of skin     Last Assessed: 2/10/2016     Obesity 5/22/2013    Sleep apnea     Somatic dysfunction of lumbar region     Last Assessed: 2/18/2016     Whiplash injury to neck     Last Assessed: 2/18/2016        Past Surgical History:   Procedure Laterality Date    APPENDECTOMY      REDUCTION OF TORSION OF TESTIS  1994    SURGERY SCROTAL / TESTICULAR      WISDOM TOOTH EXTRACTION         Social History     Socioeconomic History    Marital status: /Civil Union     Spouse name: Not on file    Number of children: Not on file    Years of education: Not on file    Highest education level: Not on file   Occupational History    Not on file   Tobacco Use    Smoking status: Never Smoker    Smokeless tobacco: Never Used    Tobacco comment: 1 cigar weekly/ Per allscripts: occasional tobacco smoker    Vaping Use    Vaping Use: Never used   Substance and Sexual Activity    Alcohol use: Yes     Comment: Per allscripts: Social alcohol use     Drug use: No    Sexual activity: Yes     Partners: Male   Other Topics Concern    Not on file   Social History Narrative    Not on file     Social Determinants of Health     Financial Resource Strain: Not on file   Food Insecurity: Not on file   Transportation Needs: Not on file   Physical Activity: Not on file   Stress: Not on file   Social Connections: Not on file   Intimate Partner Violence: Not on file   Housing Stability: Not on file       Family History   Problem Relation Age of Onset    Glaucoma Mother     Hypertension Mother     Hypertension Father     Stroke Maternal Grandmother     Heart disease Neg Hx     Diabetes Neg Hx     Thyroid disease Neg Hx     Cancer Neg Hx        Medications:    Current Outpatient Medications:     amLODIPine (NORVASC) 5 mg tablet, Take 1 tablet (5 mg total) by mouth daily, Disp: 90 tablet, Rfl: 3    Multiple Vitamins-Minerals (multivitamin with minerals) tablet, Take 1 tablet by mouth daily, Disp: , Rfl:     omeprazole (PriLOSEC) 40 MG capsule, Take 1 capsule (40 mg total) by mouth daily, Disp: 90 capsule, Rfl: 3    valsartan-hydrochlorothiazide (DIOVAN-HCT) 320-25 MG per tablet, Take 1 tablet by mouth daily, Disp: 90 tablet, Rfl: 3    Patient Active Problem List   Diagnosis    GERD (gastroesophageal reflux disease)    Hiatal hernia    Hypertension    Morbid obesity with BMI of 45 0-49 9, adult ([facility])    Obstructive sleep apnea    Well adult exam    Prediabetes    Annual physical exam    Encounter for sterilization    Muscle spasm of left shoulder    History of torsion of testis       Objective: There were no vitals taken for this visit  Right Ankle Exam     Tenderness   The patient is experiencing tenderness in the ATF (dorsal proximal foot with swelling)  Range of Motion   Dorsiflexion: abnormal   Eversion: abnormal   Inversion: abnormal     Tests   Varus tilt: positive    Other   Erythema: absent  Sensation: normal  Pulse: present     Comments: There is no tenderness to palpation of the lisfranc, and no plantar ecchymosis  There is no tenderness to palpation of the proximal and mid fibula  Physical Exam      Neurologic Exam    Procedures    I have personally reviewed pertinent films in PACS and my interpretation is   Xrays Right Foot and Ankle: There are small avulsion fractures of the dorsal talus and navicular bones with mild displacement  No OCD, ankle joint with no widening  Flattening of the 2nd and 3rd MT heads

## 2022-02-16 ENCOUNTER — OFFICE VISIT (OUTPATIENT)
Dept: OBGYN CLINIC | Facility: MEDICAL CENTER | Age: 42
End: 2022-02-16
Payer: COMMERCIAL

## 2022-02-16 VITALS
SYSTOLIC BLOOD PRESSURE: 138 MMHG | BODY MASS INDEX: 44.1 KG/M2 | HEIGHT: 71 IN | DIASTOLIC BLOOD PRESSURE: 88 MMHG | HEART RATE: 71 BPM | WEIGHT: 315 LBS

## 2022-02-16 DIAGNOSIS — S92.251D CLOSED AVULSION FRACTURE OF NAVICULAR BONE OF RIGHT FOOT WITH ROUTINE HEALING, SUBSEQUENT ENCOUNTER: Primary | ICD-10-CM

## 2022-02-16 DIAGNOSIS — S93.491D SPRAIN OF ANTERIOR TALOFIBULAR LIGAMENT OF RIGHT ANKLE, SUBSEQUENT ENCOUNTER: ICD-10-CM

## 2022-02-16 DIAGNOSIS — S92.151D CLOSED DISPLACED AVULSION FRACTURE OF RIGHT TALUS WITH ROUTINE HEALING, SUBSEQUENT ENCOUNTER: ICD-10-CM

## 2022-02-16 PROCEDURE — 3075F SYST BP GE 130 - 139MM HG: CPT | Performed by: EMERGENCY MEDICINE

## 2022-02-16 PROCEDURE — 3079F DIAST BP 80-89 MM HG: CPT | Performed by: EMERGENCY MEDICINE

## 2022-02-16 PROCEDURE — 99212 OFFICE O/P EST SF 10 MIN: CPT | Performed by: EMERGENCY MEDICINE

## 2022-02-16 NOTE — PATIENT INSTRUCTIONS
Ankle Sprain   AMBULATORY CARE:   An ankle sprain  happens when 1 or more ligaments in your ankle joint stretch or tear  Ligaments are tough tissues that connect bones  Ligaments support your joints and keep your bones in place  Common symptoms include the following:   · Trouble moving your ankle or foot    · Pain when you touch or put weight on your ankle    · Bruised, swollen, or misshapen ankle  Seek care immediately if:   · You have severe pain in your ankle  · Your foot or toes are cold or numb  · Your ankle becomes more weak or unstable (wobbly)  · You are unable to put any weight on your ankle or foot  · Your swelling has increased or returned  Contact your healthcare provider if:   · Your pain does not go away, even after treatment  · You have questions or concerns about your condition or care  Treatment:   · Medicines      ¨ NSAIDs , such as ibuprofen, help decrease swelling, pain, and fever  This medicine is available with or without a doctor's order  NSAIDs can cause stomach bleeding or kidney problems in certain people  If you take blood thinner medicine, always ask your healthcare provider if NSAIDs are safe for you  Always read the medicine label and follow directions  ¨ Acetaminophen  decreases pain  It is available without a doctor's order  Ask how much to take and how often to take it  Follow directions  Acetaminophen can cause liver damage if not taken correctly  ¨ Prescription pain medicine  may be given  Ask how to take this medicine safely  · Surgery  may be needed to repair or replace a torn ligament if your sprain does not heal with other treatments  Your healthcare provider may use screws to attach the bones in your ankle together  The screws may help support your ankle and make it stable  Ask your healthcare provider for more information about surgery to treat your ankle sprain    Self care:   · Use support devices,  such as a brace, cast, or splint, may be needed to limit your movement and protect your joint  You may need to use crutches to decrease your pain as you move around  · Go to physical therapy as directed  A physical therapist teaches you exercises to help improve movement and strength, and to decrease pain  · Rest  your ankle so that it can heal  Return to normal activities as directed  · Apply ice on your ankle for 15 to 20 minutes every hour or as directed  Use an ice pack, or put crushed ice in a plastic bag  Cover it with a towel  Ice helps prevent tissue damage and decreases swelling and pain  · Compress  your ankle  Ask if you should wrap an elastic bandage around your injured ligament  An elastic bandage provides support and helps decrease swelling and movement so your joint can heal  Wear as long as directed  · Elevate  your ankle above the level of your heart as often as you can  This will help decrease swelling and pain  Prop your ankle on pillows or blankets to keep it elevated comfortably  Prevent another ankle sprain:   · Let your ankle heal   Find out how long your ligament needs to heal  Do not do any physical activity until your healthcare provider says it is okay  If you start activity too soon, you may develop a more serious injury  · Always warm up and stretch  before you exercise or play sports  · Use the right equipment  Always wear shoes that fit well and are made for the activity that you are doing  You may also need ankle supports, elbow and knee pads, or braces  Follow up with your healthcare provider as directed:  Write down your questions so you remember to ask them during your visits  © 2017 [employer] Information is for End User's use only and may not be sold, redistributed or otherwise used for commercial purposes  All illustrations and images included in CareNotes® are the copyrighted property of [employer]  or [employer]    The above information is an  only  It is not intended as medical advice for individual conditions or treatments  Talk to your doctor, nurse or pharmacist before following any medical regimen to see if it is safe and effective for you  Ankle Exercises   AMBULATORY CARE:   What you need to know about ankle exercises: Ankle exercises help strengthen your ankle and improve its function after injury  These are beginning exercises  Ask your healthcare provider if you need to see a physical therapist for more advanced exercises  · Do these exercises 3 to 5 days a week , or as directed by your healthcare provider  Ask if you should perform the exercises on each ankle  · Do the exercises in the order that your healthcare provider recommends  This will help prevent swelling, chronic pain, and reinjury  Start with range of motion exercises  Then progress to strengthening exercises, and finally to balancing exercises  · Warm up before you do ankle exercises  Walk or ride a stationary bike for 5 to 10 minutes to prepare your ankle for movement  · Stop if you feel pain  It is normal to feel some discomfort at first  Regular exercise will help decrease your discomfort over time  How to perform range of motion exercises safely:  Begin with range of motion exercises to improve flexibility  Ask your healthcare provider when you can progress to strengthening exercises  · Ankle alphabet:  Sit on a chair so that your feet do not touch the floor  Use your big toe to write each letter of the alphabet  Use only your foot and ankle, and keep your movements small  Do 2 sets  · Calf stretches:      ¨ Sitting calf stretches with a towel:  Sit on the floor with both legs out straight in front of you  Loop a towel around the ball of your injured foot  Grasp the ends of the towel and pull it toward you  Keep your leg and back straight  Do not lean forward as you pull the towel  Hold for 30 seconds  Then relax for 30 seconds  Do 2 sets of 10  ¨ Standing calf stretches:  Stand facing a wall with the foot that is not injured forward and your knee slightly bent  Keep the leg with the injured foot straight and behind you with your toes pointed in slightly  With both heels flat on the floor, press your hips forward  Do not arch your back  Hold for 30 seconds, and then relax for 30 seconds  Do 2 sets of 10  Repeat with your leg bent  Do 2 sets of 10  How to perform strengthening exercises safely:  After you can perform range of motion exercises without pain, you may begin strengthening exercises  Ask your healthcare provider when you can progress to balancing exercises  · Ankle movement in 4 directions:  Sit on the floor with your legs straight in front of you  Keep your heels on the floor for support  ¨ Dorsiflexion:  Begin with your toes pointing straight up  Pull your toes toward your body  Slowly return to the starting position  Do 3 sets of 5      ¨ Plantar flexion:  Begin with your toes pointing straight up  Push your toes away from your body  Slowly return to the starting position  Do 3 sets of 5            ¨ Inversion:  Begin with your toes pointing straight up  Push your toes inward, toward each other  Slowly return to the starting position  Do 3 sets of 5      ¨ Eversion:  Begin with your toes pointing straight up  Push your toes outward, away from each other  Slowly return to the starting position  Do 3 sets of 5          · Toe curls with a towel:  Sit on a chair so that both of your feet are flat on the floor  Place a small towel on the floor in front of your injured foot  Grab the center of the towel with your toes and curl the towel toward you  Relax and repeat  Do 1 set of 5            · Witter pick-ups:  Sit on a chair so that both of your feet are flat on the floor  Place 20 marbles on the floor in front of your injured foot  Use your toes to  one marble at a time and place it into a bowl   Repeat until you have picked up all the marbles  Do 1 set  · Heel raises:      ¨ Single leg heel raises:  Stand with your weight evenly on both feet  Hold on to a chair or a wall for balance  Lift the foot that is not injured off the floor so all your weight is placed on your injured foot  Raise the heel of your injured foot as high as you can  Slowly lower your heel to the floor  Do 1 set of 10  ¨ Double leg heel raises:  Stand with your weight evenly on both feet  Hold on to a chair or a wall for balance  Raise both of your heels as high as you can  Slowly lower your heels to the floor  Do 1 set of 10  · Heel and toe walks:      ¨ Heel walks:  Begin in a standing position  Lift your toes off the floor and walk on your heels  Keep your toes lifted as high as possible  Do 2 sets of 10  ¨ Toe walks:  Begin in a standing position  Lift your heels off the floor and walk on the balls and toes of your feet  Keep your heels lifted as high as possible  Do 2 sets of 10  How to perform a balance exercise safely:  After you can perform strengthening exercises without pain, you may do this beginning balancing exercise  Ask your healthcare provider for more advanced balance exercises  · Single leg stance:  Stand with your weight evenly on both feet, or hold on to a chair or a wall  Do not lean to the side  Lift the foot that is not injured off the floor so all your weight is placed on your injured foot  Balance on your injured foot  Ask your healthcare provider how long to hold this position  Contact your healthcare provider if:   · Your pain becomes worse  · You have new pain  · You have questions or concerns about your condition, care, or exercise program   © 2017 [facility] Information is for End User's use only and may not be sold, redistributed or otherwise used for commercial purposes   All illustrations and images included in CareNotes® are the copyrighted property of [employer]  or Juan Manuel  The above information is an  only  It is not intended as medical advice for individual conditions or treatments  Talk to your doctor, nurse or pharmacist before following any medical regimen to see if it is safe and effective for you

## 2022-02-16 NOTE — PROGRESS NOTES
Assessment/Plan:    Diagnoses and all orders for this visit:    Closed avulsion fracture of navicular bone of right foot with routine healing, subsequent encounter    Closed displaced avulsion fracture of right talus with routine healing, subsequent encounter    Sprain of anterior talofibular ligament of right ankle, subsequent encounter    Continue home exercises, wean back into activity based on pain, would wait 6 weeks post injury to start deadlifting and make sure to wean back into this  Return if symptoms worsen or fail to improve  Chief Complaint:     Chief Complaint   Patient presents with    Right Foot - Follow-up       Subjective:   Patient ID: Dana Berumen is a 42 y o  male  DOI 1/22/22  Patient returns with improved symptoms he is hoping to get back to deadlifting, he has been able to ride stationary bike    Initial note:  New patient presents for right ankle injury described as inversion mechanism while caring [place] decorations down stairs  Complains of lateral ankle pain was evaluated at the urgent care x-rays were obtained provided a Cam boot      Review of Systems    The following portions of the patient's chart were reviewed and updated as appropriate:    Allergy:    Allergies   Allergen Reactions    Ace Inhibitors Cough         Past Medical History:   Diagnosis Date    Chronic low back pain     Last Assessed: 2/10/2016     GERD (gastroesophageal reflux disease)     Hypertension     Lump of skin     Last Assessed: 2/10/2016     Obesity 5/22/2013    Sleep apnea     Somatic dysfunction of lumbar region     Last Assessed: 2/18/2016     Whiplash injury to neck     Last Assessed: 2/18/2016        Past Surgical History:   Procedure Laterality Date    APPENDECTOMY      REDUCTION OF TORSION OF TESTIS  1994    SURGERY SCROTAL / TESTICULAR      WISDOM TOOTH EXTRACTION         Social History     Socioeconomic History    Marital status: /Civil Union     Spouse name: Not on file    Number of children: Not on file    Years of education: Not on file    Highest education level: Not on file   Occupational History    Not on file   Tobacco Use    Smoking status: Never Smoker    Smokeless tobacco: Never Used    Tobacco comment: 1 cigar weekly/ Per allscripts: occasional tobacco smoker    Vaping Use    Vaping Use: Never used   Substance and Sexual Activity    Alcohol use: Yes     Comment: Per allscripts: Social alcohol use     Drug use: No    Sexual activity: Yes     Partners: Male   Other Topics Concern    Not on file   Social History Narrative    Not on file     Social Determinants of Health     Financial Resource Strain: Not on file   Food Insecurity: Not on file   Transportation Needs: Not on file   Physical Activity: Not on file   Stress: Not on file   Social Connections: Not on file   Intimate Partner Violence: Not on file   Housing Stability: Not on file       Family History   Problem Relation Age of Onset    Glaucoma Mother     Hypertension Mother     Hypertension Father     Stroke Maternal Grandmother     Heart disease Neg Hx     Diabetes Neg Hx     Thyroid disease Neg Hx     Cancer Neg Hx        Medications:    Current Outpatient Medications:     amLODIPine (NORVASC) 5 mg tablet, Take 1 tablet (5 mg total) by mouth daily, Disp: 90 tablet, Rfl: 3    Multiple Vitamins-Minerals (multivitamin with minerals) tablet, Take 1 tablet by mouth daily, Disp: , Rfl:     omeprazole (PriLOSEC) 40 MG capsule, Take 1 capsule (40 mg total) by mouth daily, Disp: 90 capsule, Rfl: 3    valsartan-hydrochlorothiazide (DIOVAN-HCT) 320-25 MG per tablet, Take 1 tablet by mouth daily, Disp: 90 tablet, Rfl: 3    Patient Active Problem List   Diagnosis    GERD (gastroesophageal reflux disease)    Hiatal hernia    Hypertension    Morbid obesity with BMI of 45 0-49 9, adult ([facility] [address] )    Obstructive sleep apnea    Well adult exam    Prediabetes    Annual physical exam    Encounter for sterilization    Muscle spasm of left shoulder    History of torsion of testis       Objective:  /88   Pulse 71   Ht 5' 11" (1 803 m)   Wt (!) 157 kg (346 lb)   BMI 48 26 kg/m²     Right Ankle Exam     Tenderness   The patient is experiencing tenderness in the ATF  Swelling: moderate    Range of Motion   The patient has normal right ankle ROM  Other   Erythema: absent     Comments:  Lateral ankle swelling and pain            Physical Exam      Neurologic Exam    Procedures    I have personally reviewed the written report of the pertinent studies

## 2022-02-21 ENCOUNTER — OFFICE VISIT (OUTPATIENT)
Dept: SLEEP CENTER | Facility: CLINIC | Age: 42
End: 2022-02-21
Payer: COMMERCIAL

## 2022-02-21 VITALS
DIASTOLIC BLOOD PRESSURE: 70 MMHG | WEIGHT: 315 LBS | HEIGHT: 71 IN | BODY MASS INDEX: 44.1 KG/M2 | HEART RATE: 74 BPM | SYSTOLIC BLOOD PRESSURE: 130 MMHG | OXYGEN SATURATION: 96 %

## 2022-02-21 DIAGNOSIS — E66.01 MORBID OBESITY WITH BMI OF 40.0-44.9, ADULT (HCC): ICD-10-CM

## 2022-02-21 DIAGNOSIS — Z71.89 CPAP USE COUNSELING: ICD-10-CM

## 2022-02-21 DIAGNOSIS — Z72.821 INADEQUATE SLEEP HYGIENE: ICD-10-CM

## 2022-02-21 DIAGNOSIS — K21.9 GERD WITHOUT ESOPHAGITIS: ICD-10-CM

## 2022-02-21 DIAGNOSIS — G47.33 OBSTRUCTIVE SLEEP APNEA: Primary | ICD-10-CM

## 2022-02-21 DIAGNOSIS — I10 ESSENTIAL HYPERTENSION: ICD-10-CM

## 2022-02-21 PROCEDURE — 99214 OFFICE O/P EST MOD 30 MIN: CPT | Performed by: INTERNAL MEDICINE

## 2022-02-21 PROCEDURE — 3008F BODY MASS INDEX DOCD: CPT | Performed by: INTERNAL MEDICINE

## 2022-02-21 PROCEDURE — 1036F TOBACCO NON-USER: CPT | Performed by: INTERNAL MEDICINE

## 2022-02-21 NOTE — PROGRESS NOTES
Review of Systems      Genitourinary none   Cardiology none   Gastrointestinal frequent heartburn/acid reflux   Neurology forgetfulness and poor concentration or confusion,    Constitutional none   Integumentary none   Psychiatry none   Musculoskeletal none   Pulmonary none   ENT none   Endocrine none   Hematological none

## 2022-02-21 NOTE — PROGRESS NOTES
Follow-Up Note - Sleep Center   Portia Duke  42 y o  male  :1980  FDL:[id number]  WJD:    CC: I saw this patient for follow-up in clinic today for Sleep disordered breathing, Coexisting Sleep and Medical Problems  He is using a dream Station 1 machine that he received in 2019  Results of prior studies in 2014: The diagnostic study demonstrated an AHI of 87 per hour, considerably higher while supine  Minimum oxygen saturation was 77% and he spent 65 4% of the study with saturations less than 90%  During the subsequent therapeutic study, he required CPAP at 7 cm H2O to remediate his sleep disordered breathing      PFSH, Problem List, Medications & Allergies were reviewed in EMR  Interval changes: none reported  He  has a past medical history of Chronic low back pain, GERD (gastroesophageal reflux disease), Hypertension, Lump of skin, Obesity (2013), Sleep apnea, Somatic dysfunction of lumbar region, and Whiplash injury to neck  He has a current medication list which includes the following prescription(s): amlodipine, multivitamin with minerals, omeprazole, and valsartan-hydrochlorothiazide  PHYSIOLOGICAL DATA REVIEW AND INTERPRETATION:    using PAP > 4 hours/night 97%  Estimated RADHA 0 4/hour with pressure of 10cm H2O @90th percentile]; Patient has not been using ozone based devices to sanitize the machine  SUBJECTIVE: Regarding use of PAP, Dudley reports:   · He is experiencing minimal adverse effects: mask leaks ; has not noticed any fibres or foreign material in air line  · He is benefiting from use: sleeping better   Sleep Routine: Dudley reports getting 6-7 hrs sleep  ; he has no difficulty initiating or maintaining sleep   He arises before alarm most days and feels refreshed  Dudley denies Excessive Daytime Sleepiness,   He rated himself at Total score: 3 /24 on the [place] Sleepiness Scale  Habits: reports that he has never smoked   He has never used smokeless tobacco ,  reports current alcohol use ,  reports no history of drug use , Caffeine use:limited ; Exercise routine: irregular   ROS: reviewed & as attached  Significant for some weight gain since his last visit  He reported no nasal, respiratory or cardiac symptoms  Acid reflux is controlled  Stacie Pinto EXAM: /70   Pulse 74   Ht 5' 11" (1 803 m)   Wt (!) 153 kg (336 lb 9 6 oz)   SpO2 96%   BMI 46 95 kg/m²     Patient is well groomed; well appearing  CNS: Alert, orientated, clear & coherent speech  Psych: cooperativeand in no distress  Mental state:appears normal   H&N: EOMI; NC/AT:no facial pressure marks, no rashes  Skin/Extrem: col & hydration normal; no edema  Resp: Respiratory effort is normal  Physical findings otherwise essentially unchanged from previous  IMPRESSION: Problem List Items & Comorbidities Addressed this Visit    1  Obstructive sleep apnea     2  Inadequate sleep hygiene     3  GERD without esophagitis     4  Essential hypertension     5  Morbid obesity with BMI of 40 0-44 9, adult ([facility] [address] )         PLAN:  I reviewed results of prior studies and physiologic data with the patient  Notified of [employer] devices recall and their recommendation to discontinue use  Risks of discontinuing PAP vs continuing while awaiting resolution were explained and patient indicates understanding  I discussed treatment options with risks and benefits  Patient elected to  continue using Pap while awaiting remediation  Instructed  to register machine with [employer] & track progress on [place]  Care of equipment, methods to improve comfort using PAP and importance of compliance with therapy were discussed  Instructed not to use ozone based or other unapproved  cleaning devices  Pressure setting:  Continue 7-10 cmH2O  Rx provided to replace  supplies and Care coordinated with DME provider  Discussed strategies for weight reduction      Also advised allowing sufficient opportunity for sleep  Follow-up is advised in 1 year or sooner if needed to monitor progress, compliance and to adjust therapy  Thank you for allowing me to participate in the care of this patient  Sincerely,    Authenticated electronically by Rachel Avina MD on 30/18/53   Board Certified Specialist     Portions of the record may have been created with voice recognition software  Occasional wrong word or "sound a like" substitutions may have occurred due to the inherent limitations of voice recognition software  There may also be notations and random deletions of words or characters from malfunctioning software  Read the chart carefully and recognize, using context, where substitutions/deletions have occurred

## 2022-02-21 NOTE — PATIENT INSTRUCTIONS
Continuous Positive Airway Pressure (CPAP) therapy was prescribed to you as a medical necessity and there are risks of discontinuing  use of the device, some of which may be long term  Symptoms you experienced before using CPAP may return such as snoring, apneas, excessive daytime sleepiness, hypertension, cardiac arrhythmias, risk of stroke, congestive heart-failure, exacerbation of COPD and potential respiratory failure  Ultimately, it is a personal decision for you to make if you continue use of an affected device or discontinue until a replacement is provided  Unfortunately, [employer] has provided us with limited information about available devices  However, recently some patients who registered the machines early on, already received replacement  You can visit their website at www  [employer]/scr-update to register your device or www  [employer]  expertinquiry  com to learn more about how [facility] to replace your device  You can also try calling [phone]  Another option would be to check with your medical equipment provider to determine if you are eligible for a new machine through your insurance, if not you can pay out of pocket for a new machine  According to [employer], an in line bacterial filter is not recommended for use with CPAP/BiPAP  If you wish to get a replacement machine, we are able to provide you with a script  Please include your mask type  On 14 Jun 2021, [employer] announced a recall of most of the CPAP machines that it has made in the past decade  The recall notice stated that their concern centered on sound-deadening foam used in all the machines  What should an ordinary CPAP user do? You are being forced to make a personal decision, and you have very little hard data at your disposal   The epidemiologic studies cited above make it appear that [employer] is being overly cautious  However, you will have to live with the consequences of your decision    If you get in your car, you know that you could die in a fiery crash, or become paralyzed in a collision, but you drive on, because you understand that the odds of a bad outcome are acceptably small  Here you are being asked to make a similar decision  The chances that the foam in your CPAP machine will harm you irreparably is certainly small  Is it acceptably small? Only you can decide  The following was written by a physician CPAP user who decided for himself that there is minimal if any carcinogenic risk  On June 14 [employer] issued a recall for almost all their CPAP machines in the [address] [facility], approximately 2 million devices  For the rest of the world, they did not issue a recall, but rather a "safety advisory" for the millions of other CPAP machines  Their stated reason was the "difference in regulatory environments" between the [place] and the rest of the world, but what it really means is that they are concerned with the high risk of litigation in the [address] [facility]  This is much less of an issue in the rest of the world  Because of this concern, the information from [employer] to providers and patients is probably limited by their legal considerations  What did they find? Scottie Langston has two concerns  First, they found that, occasionally, in some unknown number of machines, the sound insulating foam that keeps the machine quiet can deteriorate  Tiny particles of foam could get into the airstream  Particles could, in some cases, irritate the airway and possibly cause coughing, wheezing and other allergic symptoms  This may be a more significant issue for patients with underlying lung disease, such as asthma or emphysema  As far as anyone knows, these foam breakdown particles are generally inert and similar to ordinary dust  [employer] has not released any data revealing long-term risks  Is this common? They haven't told us how often this occurs; whether it's 1/100 machines, 1/1,000 machines or 1/100,0000   We believe that [employer]' perspective is that the recall is necessary to protect them from litigation, even if the risk is very small (or nonexistent)  The second concern is the foam may release trace amounts of several chemicals connected to the manufacture of the foam  These chemicals are in widespread use in industry, and in fact, appear in small quantities in many consumer products, cosmetics, personal care products, and medicines  Based on studies done in the laboratory where bacteria are exposed to high concentrations of these chemicals, there is a concern that one or more may be carcinogenic at some dose  It is important to note that these screening tests do not correlate closely with human disease  At this time, there are no known human cancers associated with these substances  That does not mean they do not have the capability to contribute to cancers; we just don't know  Even if does, we don't know if it takes a month of exposure, a year, or a decade  Is it worse than smoking a pack of cigarettes for a year? Again, we just don't know  Even though the company just became aware of this issue, it has always been there, even if you've had your device for 2,3, or 4 or more years  In summary, we don't know how common foam breakdown occurs  If it does occur, we don't know how often it contributes to disease  We are not even sure that there is any meaningful risk at all  What can you do? In response to this information, patients have 4 choices, and we have had patients select each of these  We cannot give you advice, because there is not enough data to do that, but we can give you information to help you make a choice  The three choices are:    1  Keep using your CPAP until the recall is resolved, which could take up to a year, considering that the risk is unknown  Most of our patients with severe sleep apnea or who are very dependent on their machines have elected to do this   This includes most of our physician patients who are CPAP users  2  Stop using your device until the recall has been resolved  Some patients, especially those with mild to moderate sleep apnea, who may feel minimally different without their CPAPs have elected to stop using CPAP until the recall is resolved  Remember that most people have had symptoms of sleep apnea for years before the diagnosis was made  3  Purchase another brand of machine (such as Resmed) on your own with your own money  They are probably less expensive on the internet (about $900) than from your current equipment provider  If you decide on this, we can provide orders for you to use to purchase one  If you decide to stop using CPAP, you should monitor your symptoms carefully and DO NOT DRIVE IF YOU ARE SLEEPY  If you continue to use your device, keep the humidity and heat low or off  Do not use ozone , such as SoClean  Observe the tubing frequently to make sure there are no black particles or debris  If you decide to purchase a PAP device on your own on the internet, let us know and we can provided a prescription to  order a replacement machine  4  Use another form of therapy e g  Dental appliance, surgery      This has been a difficult time for all of us and we are here to help you if we can  The following was written by a physician CPAP user who decided for himself that there is minimal if any carcinogenic risk  Nursing Support:  When: Monday through Friday 7A-5PM except holidays  Where: Our direct line is [phone]  If you are having a true emergency please call 911  In the event that the line is busy or it is after hours please leave a voice message and we will return your call  Please speak clearly, leaving your full name, birth date, best number to reach you and the reason for your call  Medication refills:  We will need the name of the medication, the dosage, the ordering provider, whether you get a 30 or 90 day refill, and the pharmacy name and address  Medications will be ordered by the provider only  Nurses cannot call in prescriptions  Please allow 7 days for medication refills  Physician requested updates: If your provider requested that you call with an update after starting medication, please be ready to provide us the medication and dosage, what time you take your medication, the time you attempt to fall asleep, time you fall asleep, when you wake up, and what time you get out of bed  Sleep Study Results: We will contact you with sleep study results and/or next steps after the physician has reviewed your testing

## 2022-02-22 ENCOUNTER — TELEPHONE (OUTPATIENT)
Dept: SLEEP CENTER | Facility: CLINIC | Age: 42
End: 2022-02-22

## 2022-03-17 DIAGNOSIS — I10 ESSENTIAL HYPERTENSION: ICD-10-CM

## 2022-03-17 RX ORDER — AMLODIPINE BESYLATE 5 MG/1
TABLET ORAL
Qty: 90 TABLET | Refills: 3 | Status: SHIPPED | OUTPATIENT
Start: 2022-03-17

## 2022-03-25 DIAGNOSIS — K21.9 GERD WITHOUT ESOPHAGITIS: ICD-10-CM

## 2022-03-25 RX ORDER — OMEPRAZOLE 40 MG/1
CAPSULE, DELAYED RELEASE ORAL
Qty: 90 CAPSULE | Refills: 3 | Status: SHIPPED | OUTPATIENT
Start: 2022-03-25

## 2022-04-26 ENCOUNTER — OFFICE VISIT (OUTPATIENT)
Dept: FAMILY MEDICINE CLINIC | Facility: CLINIC | Age: 42
End: 2022-04-26
Payer: COMMERCIAL

## 2022-04-26 VITALS
WEIGHT: 315 LBS | SYSTOLIC BLOOD PRESSURE: 124 MMHG | HEART RATE: 84 BPM | RESPIRATION RATE: 14 BRPM | TEMPERATURE: 97.6 F | DIASTOLIC BLOOD PRESSURE: 80 MMHG | HEIGHT: 71 IN | BODY MASS INDEX: 44.1 KG/M2 | OXYGEN SATURATION: 100 %

## 2022-04-26 DIAGNOSIS — Z00.00 ANNUAL PHYSICAL EXAM: Primary | ICD-10-CM

## 2022-04-26 DIAGNOSIS — K44.9 HIATAL HERNIA: ICD-10-CM

## 2022-04-26 DIAGNOSIS — E66.01 MORBID OBESITY WITH BMI OF 45.0-49.9, ADULT (HCC): ICD-10-CM

## 2022-04-26 DIAGNOSIS — R73.03 PREDIABETES: ICD-10-CM

## 2022-04-26 DIAGNOSIS — K21.9 GASTROESOPHAGEAL REFLUX DISEASE, UNSPECIFIED WHETHER ESOPHAGITIS PRESENT: ICD-10-CM

## 2022-04-26 DIAGNOSIS — I10 HYPERTENSION, UNSPECIFIED TYPE: ICD-10-CM

## 2022-04-26 DIAGNOSIS — Z11.4 SCREENING FOR HIV (HUMAN IMMUNODEFICIENCY VIRUS): ICD-10-CM

## 2022-04-26 DIAGNOSIS — Z11.59 NEED FOR HEPATITIS C SCREENING TEST: ICD-10-CM

## 2022-04-26 PROCEDURE — 3079F DIAST BP 80-89 MM HG: CPT | Performed by: FAMILY MEDICINE

## 2022-04-26 PROCEDURE — 99396 PREV VISIT EST AGE 40-64: CPT | Performed by: FAMILY MEDICINE

## 2022-04-26 PROCEDURE — 3725F SCREEN DEPRESSION PERFORMED: CPT | Performed by: FAMILY MEDICINE

## 2022-04-26 PROCEDURE — 3074F SYST BP LT 130 MM HG: CPT | Performed by: FAMILY MEDICINE

## 2022-04-26 PROCEDURE — 3008F BODY MASS INDEX DOCD: CPT | Performed by: FAMILY MEDICINE

## 2022-04-26 PROCEDURE — 1036F TOBACCO NON-USER: CPT | Performed by: FAMILY MEDICINE

## 2022-04-26 NOTE — PATIENT INSTRUCTIONS

## 2022-04-26 NOTE — PROGRESS NOTES
[address]    NAME: Renetta Monroe  AGE: 42 y o  SEX: male  : 1980     DATE: 2022     Assessment and Plan:     Problem List Items Addressed This Visit        Digestive    GERD (gastroesophageal reflux disease)    Relevant Orders    Ambulatory Referral to Gastroenterology       Cardiovascular and Mediastinum    Hypertension    Relevant Orders    Comprehensive metabolic panel    Lipid Panel with Direct LDL reflex    TSH, 3rd generation with Free T4 reflex       Other    Hiatal hernia     Old egd was   Referred to GI         Relevant Orders    Ambulatory Referral to Gastroenterology    Morbid obesity with BMI of 45 0-49 9, adult ([address] )     Trying to lose weight         Prediabetes    Relevant Orders    Hemoglobin A1C    Annual physical exam - Primary    Need for hepatitis C screening test    Relevant Orders    Hepatitis C Antibody ([facility])    Screening for HIV (human immunodeficiency virus)    Relevant Orders    HIV 1/2 Antigen/Antibody (4th Generation) w Reflex [facility]          Immunizations and preventive care screenings were discussed with patient today  Appropriate education was printed on patient's after visit summary  Counseling:  Dental Health: discussed importance of regular tooth brushing, flossing, and dental visits  Depression Screening and Follow-up Plan: Patient was screened for depression during today's encounter  They screened negative with a PHQ-2 score of 0  Return in 6 months (on 10/26/2022) for Recheck  Chief Complaint:     Chief Complaint   Patient presents with    Annual Exam     Patient here for annual wellness exam       History of Present Illness:     Adult Annual Physical   Patient here for a comprehensive physical exam  The patient reports no problems  Diet and Physical Activity  Diet/Nutrition: well balanced diet  Exercise: no formal exercise        Depression Screening  PHQ-2/9 Depression Screening    Little interest or pleasure in doing things: 0 - not at all  Feeling down, depressed, or hopeless: 0 - not at all  PHQ-2 Score: 0  PHQ-2 Interpretation: Negative depression screen       General Health  Sleep: sleeps well  Hearing: normal - bilateral   Vision: no vision problems  Dental: regular dental visits   Health  Symptoms include: none     Review of Systems:     Review of Systems   Constitutional: Negative  HENT: Negative  Eyes: Negative  Respiratory: Negative  Cardiovascular: Negative  Gastrointestinal: Negative  Endocrine: Negative  Genitourinary: Negative  Musculoskeletal: Positive for arthralgias (right foot fracture)  Skin: Negative  Allergic/Immunologic: Negative  Neurological: Positive for numbness (left toes tingling)  Hematological: Negative  Psychiatric/Behavioral: Negative         Past Medical History:     Past Medical History:   Diagnosis Date    Chronic low back pain     Last Assessed: 2/10/2016     GERD (gastroesophageal reflux disease)     Hypertension     Lump of skin     Last Assessed: 2/10/2016     Obesity 5/22/2013    Sleep apnea     Somatic dysfunction of lumbar region     Last Assessed: 2/18/2016     Whiplash injury to neck     Last Assessed: 2/18/2016       Past Surgical History:     Past Surgical History:   Procedure Laterality Date    APPENDECTOMY      REDUCTION OF TORSION OF TESTIS  1994    SURGERY SCROTAL / TESTICULAR      WISDOM TOOTH EXTRACTION        Family History:     Family History   Problem Relation Age of Onset   Rodriguez Glaucoma Mother     Hypertension Mother     Hypertension Father     Stroke Maternal Grandmother     Heart disease Neg Hx     Diabetes Neg Hx     Thyroid disease Neg Hx     Cancer Neg Hx       Social History:     Social History     Socioeconomic History    Marital status: /Civil Union     Spouse name: None    Number of children: None    Years of education: None    Highest education level: None   Occupational History    None   Tobacco Use    Smoking status: Never Smoker    Smokeless tobacco: Never Used    Tobacco comment: 1 cigar weekly/ Per allscripts: occasional tobacco smoker    Vaping Use    Vaping Use: Never used   Substance and Sexual Activity    Alcohol use: Yes     Comment: Per allscripts: Social alcohol use     Drug use: No    Sexual activity: Yes     Partners: Male   Other Topics Concern    None   Social History Narrative    None     Social Determinants of Health     Financial Resource Strain: Not on file   Food Insecurity: Not on file   Transportation Needs: Not on file   Physical Activity: Not on file   Stress: Not on file   Social Connections: Not on file   Intimate Partner Violence: Not on file   Housing Stability: Not on file      Current Medications:     Current Outpatient Medications   Medication Sig Dispense Refill    amLODIPine (NORVASC) 5 mg tablet TAKE 1 TABLET DAILY 90 tablet 3    Multiple Vitamins-Minerals (multivitamin with minerals) tablet Take 1 tablet by mouth daily      omeprazole (PriLOSEC) 40 MG capsule TAKE 1 CAPSULE DAILY 90 capsule 3    valsartan-hydrochlorothiazide (DIOVAN-HCT) 320-25 MG per tablet Take 1 tablet by mouth daily 90 tablet 3     No current facility-administered medications for this visit  Allergies: Allergies   Allergen Reactions    Ace Inhibitors Cough      Physical Exam:     /80 (BP Location: Left arm, Patient Position: Sitting, Cuff Size: Large)   Pulse 84   Temp 97 6 °F (36 4 °C)   Resp 14   Ht 5' 11 3" (1 811 m)   Wt (!) 147 kg (323 lb 9 6 oz)   SpO2 100%   BMI 44 76 kg/m²     Physical Exam  Vitals and nursing note reviewed  Constitutional:       Appearance: Normal appearance  He is well-developed  HENT:      Head: Normocephalic and atraumatic        Right Ear: External ear normal       Left Ear: External ear normal       Nose: Nose normal    Eyes:      Extraocular Movements: Extraocular movements intact  Conjunctiva/sclera: Conjunctivae normal       Pupils: Pupils are equal, round, and reactive to light  Cardiovascular:      Rate and Rhythm: Normal rate and regular rhythm  Heart sounds: Normal heart sounds  Pulmonary:      Effort: Pulmonary effort is normal       Breath sounds: Normal breath sounds  Abdominal:      General: Abdomen is flat  Bowel sounds are normal       Palpations: Abdomen is soft  Musculoskeletal:         General: Normal range of motion  Cervical back: Normal range of motion and neck supple  Skin:     General: Skin is warm and dry  Capillary Refill: Capillary refill takes less than 2 seconds  Neurological:      General: No focal deficit present  Mental Status: He is alert and oriented to person, place, and time  Psychiatric:         Mood and Affect: Mood normal          Behavior: Behavior normal          Thought Content:  Thought content normal          Judgment: Judgment normal           Marga Gomez DO  [address] [facility]

## 2022-06-20 NOTE — TELEPHONE ENCOUNTER
Patient is calling because he is ready to schedule VAS    Patient requesting a call back at [phone]

## 2022-06-22 NOTE — TELEPHONE ENCOUNTER
Patient has been RS to 9/23 at the [facility] w Dr Philip Gonzalez per his preference in timing  He is aware that we will touch base in August to confirm he still wishes to proceed and mailing of surgical packet  He is aware to contact us if he should need anything between now and then

## 2022-09-27 ENCOUNTER — CONSULT (OUTPATIENT)
Dept: GASTROENTEROLOGY | Facility: AMBULARY SURGERY CENTER | Age: 42
End: 2022-09-27
Payer: COMMERCIAL

## 2022-09-27 VITALS
WEIGHT: 315 LBS | DIASTOLIC BLOOD PRESSURE: 102 MMHG | HEART RATE: 87 BPM | HEIGHT: 71 IN | BODY MASS INDEX: 44.1 KG/M2 | OXYGEN SATURATION: 99 % | SYSTOLIC BLOOD PRESSURE: 180 MMHG

## 2022-09-27 DIAGNOSIS — K21.9 GASTROESOPHAGEAL REFLUX DISEASE, UNSPECIFIED WHETHER ESOPHAGITIS PRESENT: ICD-10-CM

## 2022-09-27 DIAGNOSIS — K44.9 HIATAL HERNIA: ICD-10-CM

## 2022-09-27 PROCEDURE — 3077F SYST BP >= 140 MM HG: CPT | Performed by: INTERNAL MEDICINE

## 2022-09-27 PROCEDURE — 3080F DIAST BP >= 90 MM HG: CPT | Performed by: INTERNAL MEDICINE

## 2022-09-27 PROCEDURE — 99204 OFFICE O/P NEW MOD 45 MIN: CPT | Performed by: INTERNAL MEDICINE

## 2022-09-27 RX ORDER — FAMOTIDINE 20 MG/1
20 TABLET, FILM COATED ORAL 2 TIMES DAILY
Qty: 180 TABLET | Refills: 3 | Status: SHIPPED | OUTPATIENT
Start: 2022-09-27

## 2022-09-27 NOTE — PATIENT INSTRUCTIONS
- stop omeprazole (if possible) for 1-2 weeks prior to EGD  Scheduled date of EGD(as of today):11/28/2022  Physician performing EGD:DR Paula Singh  [facility]  Instructions reviewed with patient by:SHORTY BLANCO  Clearances:

## 2022-09-27 NOTE — PROGRESS NOTES
[facility] - Outpatient Consultation  Zina Rhoades 36 y o  male MRN: [id number]  Encounter: [id number]      PCP: [facility]olanda Saldana DO  Referring: Yolanda Saldana DO  [address] [address]  [place],  [address]      ASSESSMENT AND PLAN:      1  Gastroesophageal reflux disease, unspecified whether esophagitis present  2  Hiatal hernia  Discussed anti-reflux measures, and offered handout detailing, including weight loss, avoidance of lying down after meals, recognize/avoid trigger foods, and elevating the head of bed    - continue omeprazole 40 mg daily  - famotidine (PEPCID) 20 mg tablet; Take 1 tablet (20 mg total) by mouth 2 (two) times a day  Dispense: 180 tablet; Refill: 3  - Ambulatory Referral to Gastroenterology  - EGD; Future- recommend hold PPI for 2 weeks prior to procedure to objectively evaluate for erosive GERD    ______________________________________________________________________    CC:  Chief Complaint   Patient presents with    GERD    EGD    New Patient Visit     hernia       HPI:      Patient is a 44-year-old male referred for GERD, hiatal hernia  He has HTN, AMBROSE, chronic low back pain, prediabetes, BMI of 47  Patient has greater than 10 years of GERD symptoms, for which he has been on omeprazole 40 mg daily  He has daily breakthrough symptoms which were never controlled by omeprazole despite taking the medication  He has symptoms hours after eating, with reflux and associated voids changes  Symptoms affect his quality of life  Symptoms are exacerbated by fried food  He denies any dysphagia  GERD HRQL score 20  Last EGD in 2014  Never previously had a colonoscopy- no family history of colon cancer  Regular bowel movements daily without rectal bleeding  Abdominal Pain            REVIEW OF SYSTEMS:    CONSTITUTIONAL: Denies any fever, chills, rigors, and weight loss  HEENT: No earache or tinnitus   Denies hearing loss or visual disturbances  CARDIOVASCULAR: No chest pain or palpitations  RESPIRATORY: Denies any cough, hemoptysis, shortness of breath or dyspnea on exertion  GASTROINTESTINAL: As noted in the History of Present Illness  GENITOURINARY: No problems with urination  Denies any hematuria or dysuria  NEUROLOGIC: No dizziness or vertigo, denies headaches  MUSCULOSKELETAL: Denies any muscle or joint pain  SKIN: Denies skin rashes or itching  ENDOCRINE: Denies excessive thirst  Denies intolerance to heat or cold  PSYCHOSOCIAL: Denies depression or anxiety  Denies any recent memory loss         Historical Information   Past Medical History:   Diagnosis Date    Chronic low back pain     Last Assessed: 2/10/2016     GERD (gastroesophageal reflux disease)     Hypertension     Lump of skin     Last Assessed: 2/10/2016     Obesity 5/22/2013    Sleep apnea     Somatic dysfunction of lumbar region     Last Assessed: 2/18/2016     Whiplash injury to neck     Last Assessed: 2/18/2016      Past Surgical History:   Procedure Laterality Date    APPENDECTOMY      REDUCTION OF TORSION OF TESTIS  1994    SURGERY SCROTAL / TESTICULAR      UPPER GASTROINTESTINAL ENDOSCOPY      WISDOM TOOTH EXTRACTION       Social History   Social History     Substance and Sexual Activity   Alcohol Use Yes    Comment: Per allscripts: Social alcohol use      Social History     Substance and Sexual Activity   Drug Use No     Social History     Tobacco Use   Smoking Status Never Smoker   Smokeless Tobacco Never Used   Tobacco Comment    1 cigar weekly/ Per allscripts: occasional tobacco smoker      Family History   Problem Relation Age of Onset    Glaucoma Mother     Hypertension Mother     Hypertension Father     Stroke Maternal Grandmother     Heart disease Neg Hx     Diabetes Neg Hx     Thyroid disease Neg Hx     Cancer Neg Hx        Meds/Allergies       Current Outpatient Medications:     amLODIPine (NORVASC) 5 mg tablet   omeprazole (PriLOSEC) 40 MG capsule    valsartan-hydrochlorothiazide (DIOVAN-HCT) 320-25 MG per tablet    Multiple Vitamins-Minerals (multivitamin with minerals) tablet    Allergies   Allergen Reactions    Ace Inhibitors Cough           Objective     Blood pressure (!) 180/102, pulse 87, height 5' 11" (1 803 m), weight (!) 152 kg (336 lb), SpO2 99 %  Body mass index is 46 86 kg/m²  PHYSICAL EXAM:      General Appearance:   Alert, cooperative, no distress   HEENT:   Normocephalic, atraumatic, anicteric  Neck:  Supple, symmetrical, trachea midline   Lungs:   Clear to auscultation bilaterally; no rales, rhonchi or wheezing; respirations unlabored    Heart[de-identified]   Regular rate and rhythm; no murmur, rub, or gallop  Abdomen:   Soft, non-tender, non-distended; normal bowel sounds; no masses, no organomegaly    Genitalia:   Deferred    Rectal:   Deferred    Extremities:  No cyanosis, clubbing or edema    Pulses:  2+ and symmetric    Skin:  No jaundice, rashes, or lesions    Lymph nodes:  No palpable cervical lymphadenopathy        Lab Results:     Lab Results   Component Value Date    WBC 6 96 02/28/2021    HGB 15 1 02/28/2021    HCT 47 2 02/28/2021    MCV 86 02/28/2021     02/28/2021       Lab Results   Component Value Date     02/02/2016    K 3 9 12/14/2021     12/14/2021    CO2 29 12/14/2021    ANIONGAP 3 (L) 03/23/2015    BUN 15 12/14/2021    CREATININE 1 29 12/14/2021    GLUCOSE 98 03/23/2015    GLUF 110 (H) 12/14/2021    CALCIUM 9 0 12/14/2021    CORRECTEDCA 9 5 12/14/2021    AST 18 12/14/2021    ALT 32 12/14/2021    ALKPHOS 66 12/14/2021    PROT 7 6 02/02/2016    BILITOT 0 8 02/02/2016    EGFR 79 12/14/2021       No results found for: INR, PROTIME      Radiology Results:   No results found  Portions of the record may have been created with voice recognition software   Occasional wrong word or "sound a like" substitutions may have occurred due to the inherent limitations of voice recognition software  Read the chart carefully and recognize, using context, where substitutions have occurred

## 2022-09-29 ENCOUNTER — TELEPHONE (OUTPATIENT)
Dept: UROLOGY | Facility: MEDICAL CENTER | Age: 42
End: 2022-09-29

## 2022-10-11 PROBLEM — Z11.59 NEED FOR HEPATITIS C SCREENING TEST: Status: RESOLVED | Noted: 2022-04-26 | Resolved: 2022-10-11

## 2022-10-13 NOTE — PRE-PROCEDURE INSTRUCTIONS
Pre-Surgery Instructions:   Medication Instructions   • amLODIPine (NORVASC) 5 mg tablet Take day of surgery  • omeprazole (PriLOSEC) 40 MG capsule Take day of surgery  • valsartan-hydrochlorothiazide (DIOVAN-HCT) 320-25 MG per tablet Hold day of surgery  Pt denies fever, sob, sore throat and cough  Pt verbalized understanding of shower and med instructions  Pt instructed to stop nsaids and supplements prior to surgery  Pt was instructed that he needs to have a person with him if he uses [employer] for his transportation

## 2022-10-14 ENCOUNTER — ANESTHESIA EVENT (OUTPATIENT)
Dept: PERIOP | Facility: AMBULARY SURGERY CENTER | Age: 42
End: 2022-10-14
Payer: COMMERCIAL

## 2022-10-14 ENCOUNTER — HOSPITAL ENCOUNTER (OUTPATIENT)
Facility: AMBULARY SURGERY CENTER | Age: 42
Setting detail: OUTPATIENT SURGERY
Discharge: HOME | End: 2022-10-14
Attending: UROLOGY | Admitting: UROLOGY
Payer: COMMERCIAL

## 2022-10-14 ENCOUNTER — ANESTHESIA (OUTPATIENT)
Dept: PERIOP | Facility: AMBULARY SURGERY CENTER | Age: 42
End: 2022-10-14
Payer: COMMERCIAL

## 2022-10-14 ENCOUNTER — TELEPHONE (OUTPATIENT)
Dept: UROLOGY | Facility: CLINIC | Age: 42
End: 2022-10-14

## 2022-10-14 VITALS
SYSTOLIC BLOOD PRESSURE: 162 MMHG | RESPIRATION RATE: 18 BRPM | HEIGHT: 71 IN | OXYGEN SATURATION: 99 % | DIASTOLIC BLOOD PRESSURE: 92 MMHG | BODY MASS INDEX: 44.1 KG/M2 | WEIGHT: 315 LBS | TEMPERATURE: 97.8 F | HEART RATE: 72 BPM

## 2022-10-14 DIAGNOSIS — Z30.2 ENCOUNTER FOR STERILIZATION: Primary | ICD-10-CM

## 2022-10-14 DIAGNOSIS — Z87.438 HISTORY OF TORSION OF TESTIS: ICD-10-CM

## 2022-10-14 DIAGNOSIS — Z87.438 HISTORY OF TORSION OF TESTIS: Primary | ICD-10-CM

## 2022-10-14 PROCEDURE — NC001 PR NO CHARGE: Performed by: UROLOGY

## 2022-10-14 RX ORDER — SODIUM CHLORIDE, SODIUM LACTATE, POTASSIUM CHLORIDE, CALCIUM CHLORIDE 600; 310; 30; 20 MG/100ML; MG/100ML; MG/100ML; MG/100ML
INJECTION, SOLUTION INTRAVENOUS CONTINUOUS PRN
Status: DISCONTINUED | OUTPATIENT
Start: 2022-10-14 | End: 2022-10-14

## 2022-10-14 RX ORDER — KETOROLAC TROMETHAMINE 30 MG/ML
INJECTION, SOLUTION INTRAMUSCULAR; INTRAVENOUS AS NEEDED
Status: DISCONTINUED | OUTPATIENT
Start: 2022-10-14 | End: 2022-10-14

## 2022-10-14 RX ORDER — BUPIVACAINE HYDROCHLORIDE 5 MG/ML
INJECTION, SOLUTION PERINEURAL AS NEEDED
Status: DISCONTINUED | OUTPATIENT
Start: 2022-10-14 | End: 2022-10-14 | Stop reason: HOSPADM

## 2022-10-14 RX ORDER — LIDOCAINE HYDROCHLORIDE 10 MG/ML
INJECTION, SOLUTION EPIDURAL; INFILTRATION; INTRACAUDAL; PERINEURAL AS NEEDED
Status: DISCONTINUED | OUTPATIENT
Start: 2022-10-14 | End: 2022-10-14

## 2022-10-14 RX ORDER — FENTANYL CITRATE/PF 50 MCG/ML
25 SYRINGE (ML) INJECTION
Status: DISCONTINUED | OUTPATIENT
Start: 2022-10-14 | End: 2022-10-14 | Stop reason: HOSPADM

## 2022-10-14 RX ORDER — MIDAZOLAM HYDROCHLORIDE 2 MG/2ML
INJECTION, SOLUTION INTRAMUSCULAR; INTRAVENOUS AS NEEDED
Status: DISCONTINUED | OUTPATIENT
Start: 2022-10-14 | End: 2022-10-14

## 2022-10-14 RX ORDER — ONDANSETRON 2 MG/ML
4 INJECTION INTRAMUSCULAR; INTRAVENOUS ONCE AS NEEDED
Status: DISCONTINUED | OUTPATIENT
Start: 2022-10-14 | End: 2022-10-14 | Stop reason: HOSPADM

## 2022-10-14 RX ORDER — CEFAZOLIN SODIUM 2 G/50ML
2000 SOLUTION INTRAVENOUS ONCE
Status: DISCONTINUED | OUTPATIENT
Start: 2022-10-14 | End: 2022-10-14 | Stop reason: HOSPADM

## 2022-10-14 RX ORDER — FENTANYL CITRATE 50 UG/ML
INJECTION, SOLUTION INTRAMUSCULAR; INTRAVENOUS AS NEEDED
Status: DISCONTINUED | OUTPATIENT
Start: 2022-10-14 | End: 2022-10-14

## 2022-10-14 RX ORDER — CEFAZOLIN SODIUM 1 G/3ML
INJECTION, POWDER, FOR SOLUTION INTRAMUSCULAR; INTRAVENOUS AS NEEDED
Status: DISCONTINUED | OUTPATIENT
Start: 2022-10-14 | End: 2022-10-14

## 2022-10-14 RX ORDER — PROPOFOL 10 MG/ML
INJECTION, EMULSION INTRAVENOUS AS NEEDED
Status: DISCONTINUED | OUTPATIENT
Start: 2022-10-14 | End: 2022-10-14

## 2022-10-14 RX ORDER — ONDANSETRON 2 MG/ML
INJECTION INTRAMUSCULAR; INTRAVENOUS AS NEEDED
Status: DISCONTINUED | OUTPATIENT
Start: 2022-10-14 | End: 2022-10-14

## 2022-10-14 RX ORDER — CEFAZOLIN SODIUM 1 G/50ML
1000 SOLUTION INTRAVENOUS ONCE
Status: DISCONTINUED | OUTPATIENT
Start: 2022-10-14 | End: 2022-10-14 | Stop reason: HOSPADM

## 2022-10-14 RX ORDER — PROPOFOL 10 MG/ML
INJECTION, EMULSION INTRAVENOUS CONTINUOUS PRN
Status: DISCONTINUED | OUTPATIENT
Start: 2022-10-14 | End: 2022-10-14

## 2022-10-14 RX ADMIN — LIDOCAINE HYDROCHLORIDE 50 MG: 10 INJECTION, SOLUTION EPIDURAL; INFILTRATION; INTRACAUDAL; PERINEURAL at 09:38

## 2022-10-14 RX ADMIN — FENTANYL CITRATE 50 MCG: 50 INJECTION INTRAMUSCULAR; INTRAVENOUS at 10:15

## 2022-10-14 RX ADMIN — PROPOFOL 100 MG: 10 INJECTION, EMULSION INTRAVENOUS at 09:38

## 2022-10-14 RX ADMIN — KETOROLAC TROMETHAMINE 15 MG: 30 INJECTION, SOLUTION INTRAMUSCULAR at 10:06

## 2022-10-14 RX ADMIN — SODIUM CHLORIDE, SODIUM LACTATE, POTASSIUM CHLORIDE, AND CALCIUM CHLORIDE: .6; .31; .03; .02 INJECTION, SOLUTION INTRAVENOUS at 08:51

## 2022-10-14 RX ADMIN — ONDANSETRON 4 MG: 2 INJECTION INTRAMUSCULAR; INTRAVENOUS at 09:38

## 2022-10-14 RX ADMIN — CEFAZOLIN 3000 MG: 1 INJECTION, POWDER, FOR SOLUTION INTRAMUSCULAR; INTRAVENOUS at 09:37

## 2022-10-14 RX ADMIN — MIDAZOLAM 2 MG: 1 INJECTION INTRAMUSCULAR; INTRAVENOUS at 09:37

## 2022-10-14 RX ADMIN — PROPOFOL 100 MCG/KG/MIN: 10 INJECTION, EMULSION INTRAVENOUS at 09:38

## 2022-10-14 RX ADMIN — FENTANYL CITRATE 50 MCG: 50 INJECTION INTRAMUSCULAR; INTRAVENOUS at 10:10

## 2022-10-14 RX ADMIN — FENTANYL CITRATE 50 MCG: 50 INJECTION INTRAMUSCULAR; INTRAVENOUS at 09:30

## 2022-10-14 NOTE — ANESTHESIA PREPROCEDURE EVALUATION
Procedure:  VASECTOMY (Bilateral Scrotum)    Relevant Problems   CARDIO   (+) Hypertension      GI/HEPATIC   (+) GERD (gastroesophageal reflux disease)   (+) Hiatal hernia      NEURO/PSYCH   (+) History of torsion of testis      PULMONARY   (+) Obstructive sleep apnea        Physical Exam    Airway    Mallampati score: II  TM Distance: >3 FB  Neck ROM: full     Dental   No notable dental hx     Cardiovascular  Cardiovascular exam normal    Pulmonary  Pulmonary exam normal     Other Findings        Anesthesia Plan  ASA Score- 3     Anesthesia Type- IV sedation with anesthesia with ASA Monitors  Additional Monitors:   Airway Plan:           Plan Factors-Exercise tolerance (METS): >4 METS  Patient is not a current smoker  Patient not instructed to abstain from smoking on day of procedure  Patient did not smoke on day of surgery  Induction-     Postoperative Plan-     Informed Consent- Anesthetic plan and risks discussed with patient  I personally reviewed this patient with the CRNA  Discussed and agreed on the Anesthesia Plan with the CRNA  Suzan Sams

## 2022-10-14 NOTE — OP NOTE
OPERATIVE REPORT  PATIENT NAME: James Guzman    :  1980  MRN: [id number]  Pt Location: [facility] OR [facility]OM 04    SURGERY DATE: 10/14/2022    Surgeon(s) and Role:     * Kapil Evans MD - Primary    Preop Diagnosis:  1  Desire for surgical sterility  2  History of left testicular torsion  3  Atrophy of the left testes    Post-Op Diagnosis Codes:  1  Desire for surgical sterility  2  History of left testicular torsion  3  Atrophy of the left testes    Procedure(s) (LRB):  VASECTOMY (Bilateral)    Specimen(s):  ID Type Source Tests Collected by Time Destination   1 : Left Vas Deferens Tissue Vas Deferens, Left TISSUE EXAM Kapil Evans MD 10/14/2022 3048    2 : Right Vas Deferens Tissue Vas Deferens, Right TISSUE EXAM Kapil Evans MD 10/14/2022 4295        Estimated Blood Loss:   Minimal    Drains:  * No LDAs found *    Anesthesia Type:   IV Sedation with Anesthesia    Operative Indications: This is a 41-year-old male who desires surgical sterility  Recent physical examination demonstrated significant atrophy of the left testes  In addition the patient has a BMI of 46  For these reasons I recommended perform the procedure in the surgery center under IV sedation  Procedure Details     The risks and benefits of the procedure were discussed at the pre-procedure consultation, and written, informed consent obtained  The scrotum was cleansed with warm Betadine and draped in the usual sterile manner  A vasal sheath block was performed on both the left and right vas  After adequate anesthesia was established, a small perforation was made in the skin and the left vas was isolated with the ring forceps, dissected free and delivered through the skin perforation  The left vas was divided, approximately 1 5 cm portion removed, and each end of the vas was cauterized and suture ligated  The ends of the vas were replaced in the scrotum through the puncture site    The right vas was then isolated, divided, cauterized and ligated in a similar fashion  Midportions removed were sent to pathology to confirm  Any bleeding was controlled with electrocautery  The puncture site was dry when the procedure was completed  After discussion with the patient, the puncture site was sutured using single 5-0 chromic suture in figure 8 fashion  Specimen:   1  Right vas deferens  2  Left vas deferens    Condition: Stable    Complications: none    Plan:        He did very well with the procedure today  Postprocedural instructions were provided, including instructions, scheduling information, and the specimen cup for his postprocedural hemodialysis  He was instructed to perform this at 6 weeks and 2 call my office after the specimen is submitted to review the results by phone  He was instructed to continue his current methods of contraception until that time  Finally I discussed with the patient the recovery room that his exam under anesthesia revealed a an abnormal left testicle  It is difficult to determine if these are simple postoperative changes related to his prior orchiopexy  I have therefore recommended a trans scrotal ultrasound to ensure that there are no intratesticular tumors  I discussed this with the patient I placed the order for the study and I have asked my office to schedule this study in the near future  Otherwise he will follow-up as above      SIGNATURE: Clark Minor MD  DATE: October 14, 2022  TIME: 1:28 PM

## 2022-10-14 NOTE — TELEPHONE ENCOUNTER
Patient is status post vasectomy today  Procedures port formed in the operating room as the patient has a history of left testicular torsion status post prior orchiopexy  Of note his left testes is somewhat atypical on physical examination  I believe this is most likely postoperative changes related to his prior orchiopexy but I recommended obtaining a non urgent scrotal ultrasound to ensure there is no underlying testicular tumor  Patient is amenable to this plan  Please help to schedule the patient for the ultrasound and advanced practitioner follow-up visit to review  Of note, he is scheduled to see Alvarado Ramos in 2 weeks  I may not be enough time to do the ultrasound  As such that visit may need to be pushed back  Please know, I did provide all of the post vasectomy teaching and written instructions and sample cups with a semen analysis today, so he does not need a formal visit for vasectomy follow-up unless he is having any trouble

## 2022-10-14 NOTE — TELEPHONE ENCOUNTER
CALLED PT POPEYE WITH [employer] [facility]'S PHONE# TO SCHEDULE THE US   TOLD HIM ON THE MESSAGE THAT IF THE US IS SCHEDULED AFTER THE APPT WITH FAUSTO THAT WE WOULD HAVE TO CHANGE THE APPT AND TO CALL US BACK SO WE CAN DO SO

## 2022-10-14 NOTE — ANESTHESIA POSTPROCEDURE EVALUATION
Post-Op Assessment Note    CV Status:  Stable  Pain Score: 0    Pain management: adequate     Mental Status:  Alert and awake   Hydration Status:  Euvolemic   PONV Controlled:  Controlled   Airway Patency:  Patent   Two or more mitigation strategies used for obstructive sleep apnea   Post Op Vitals Reviewed: Yes      Staff: CRNA, Anesthesiologist         No complications documented      BP   139/83   Temp  97 8   Pulse  77   Resp   16   SpO2   100

## 2022-10-14 NOTE — H&P
UROLOGY HISTORY AND PHYSICAL     Patient Identifiers: Zayda Carrier (MRN [id number])      Date of Service: 10/14/2022        ASSESSMENT:     36 y o  old male with  desires surgical sterility, patient with obesity BMI of 46 and history of left testicular torsion with testicular atrophy  As such plan is for vasectomy under IV sedation  PLAN:   Vasectomy        History of Present Illness:     Zayda Carrier is a 36 y o  old with a history of desiring surgical sterility    Past Medical, Past Surgical History:     Past Medical History:   Diagnosis Date   • Chronic low back pain     Last Assessed: 2/10/2016    • CPAP (continuous positive airway pressure) dependence    • GERD (gastroesophageal reflux disease)    • Hypertension    • Lump of skin     Last Assessed: 2/10/2016    • Obesity 05/22/2013   • Sleep apnea    • Somatic dysfunction of lumbar region     Last Assessed: 2/18/2016    • Whiplash injury to neck     Last Assessed: 2/18/2016    :    Past Surgical History:   Procedure Laterality Date   • APPENDECTOMY     • REDUCTION OF TORSION OF TESTIS  1994   • SURGERY SCROTAL / TESTICULAR     • UPPER GASTROINTESTINAL ENDOSCOPY     • WISDOM TOOTH EXTRACTION     :    Medications, Allergies:   No current facility-administered medications for this encounter  Allergies: Allergies   Allergen Reactions   • Ace Inhibitors Cough   :    Social and Family History:   Social History:   Social History     Tobacco Use   • Smoking status: Never Smoker   • Smokeless tobacco: Never Used   • Tobacco comment: 1 cigar weekly/ Per allscripts: occasional tobacco smoker    Vaping Use   • Vaping Use: Every day   • Substances: Flavoring   Substance Use Topics   • Alcohol use: Yes     Comment: once per month only   • Drug use: Never         Social History     Tobacco Use   Smoking Status Never Smoker   Smokeless Tobacco Never Used   Tobacco Comment    1 cigar weekly/ Per allscripts: occasional tobacco smoker        Family History:  Family History   Problem Relation Age of Onset   • Glaucoma Mother    • Hypertension Mother    • Hypertension Father    • Stroke Maternal Grandmother    • Heart disease Neg Hx    • Diabetes Neg Hx    • Thyroid disease Neg Hx    • Cancer Neg Hx    :     Review of Systems:     General: Fever, chills, or night sweats: negative  Cardiac: Negative for chest pain  Pulmonary: Negative for shortness of breath  Gastrointestinal: Abdominal pain negative  Nausea, vomiting, or diarrhea negative  Genitourinary: See HPI above  Patient does nothave hematuria  All other systems queried were negative  Physical Exam:   General: Patient is pleasant and in NAD  Awake and alert  There were no vitals taken for this visit  HEENT:  Normocephalic atraumatic  Cardiac:  Regular rate and rhythm, Peripheral edema: negative  Pulmonary: Non-labored breathing, CTAB  Abdomen: Soft, non-tender, non-distended  No surgical scars  No masses, tenderness, hernias noted  Genitourinary: negative CVA tenderness, neg suprapubic tenderness  Extremities: normal movement in all 4       Labs:     Lab Results   Component Value Date    HGB 15 1 02/28/2021    HCT 47 2 02/28/2021    WBC 6 96 02/28/2021     02/28/2021   ]    Lab Results   Component Value Date     02/02/2016    K 3 9 12/14/2021     12/14/2021    CO2 29 12/14/2021    BUN 15 12/14/2021    CREATININE 1 29 12/14/2021    CALCIUM 9 0 12/14/2021    GLUCOSE 98 03/23/2015   ]    Imaging:   I personally reviewed the images and report of the following studies, and reviewed them with the patient:        Thank you for allowing me to participate in this patients’ care  Please do not hesitate to call with any additional questions    Radha De La Fuente

## 2022-10-18 NOTE — TELEPHONE ENCOUNTER
2nd attempt: CALLED PT LM WITH [employer] [facility]'S PHONE# TO SCHEDULE THE US   TOLD HIM ON THE MESSAGE THAT IF THE US IS SCHEDULED AFTER THE APPT WITH FAUSTO THAT WE WOULD HAVE TO CHANGE THE APPT AND TO CALL US BACK SO WE CAN DO SO

## 2022-10-23 ENCOUNTER — APPOINTMENT (OUTPATIENT)
Dept: LAB | Facility: CLINIC | Age: 42
End: 2022-10-23
Payer: COMMERCIAL

## 2022-10-23 DIAGNOSIS — Z11.4 SCREENING FOR HIV (HUMAN IMMUNODEFICIENCY VIRUS): ICD-10-CM

## 2022-10-23 DIAGNOSIS — R73.03 PREDIABETES: ICD-10-CM

## 2022-10-23 DIAGNOSIS — Z11.59 NEED FOR HEPATITIS C SCREENING TEST: ICD-10-CM

## 2022-10-23 DIAGNOSIS — I10 HYPERTENSION, UNSPECIFIED TYPE: ICD-10-CM

## 2022-10-23 LAB
ALBUMIN SERPL BCP-MCNC: 3.9 G/DL (ref 3.5–5)
ALP SERPL-CCNC: 59 U/L (ref 34–104)
ALT SERPL W P-5'-P-CCNC: 14 U/L (ref 7–52)
ANION GAP SERPL CALCULATED.3IONS-SCNC: 5 MMOL/L (ref 4–13)
AST SERPL W P-5'-P-CCNC: 16 U/L (ref 13–39)
BILIRUB SERPL-MCNC: 0.54 MG/DL (ref 0.2–1)
BUN SERPL-MCNC: 14 MG/DL (ref 5–25)
CALCIUM SERPL-MCNC: 9.1 MG/DL (ref 8.4–10.2)
CHLORIDE SERPL-SCNC: 104 MMOL/L (ref 96–108)
CHOLEST SERPL-MCNC: 193 MG/DL
CO2 SERPL-SCNC: 31 MMOL/L (ref 21–32)
CREAT SERPL-MCNC: 1.21 MG/DL (ref 0.6–1.3)
EST. AVERAGE GLUCOSE BLD GHB EST-MCNC: 126 MG/DL
GFR SERPL CREATININE-BSD FRML MDRD: 72 ML/MIN/1.73SQ M
GLUCOSE P FAST SERPL-MCNC: 91 MG/DL (ref 65–99)
HBA1C MFR BLD: 6 %
HCV AB SER QL: NORMAL
HDLC SERPL-MCNC: 41 MG/DL
LDLC SERPL CALC-MCNC: 127 MG/DL (ref 0–100)
POTASSIUM SERPL-SCNC: 3.9 MMOL/L (ref 3.5–5.3)
PROT SERPL-MCNC: 7.3 G/DL (ref 6.4–8.4)
SODIUM SERPL-SCNC: 140 MMOL/L (ref 135–147)
TRIGL SERPL-MCNC: 123 MG/DL
TSH SERPL DL<=0.05 MIU/L-ACNC: 1.35 UIU/ML (ref 0.45–4.5)

## 2022-10-23 PROCEDURE — 87389 HIV-1 AG W/HIV-1&-2 AB AG IA: CPT

## 2022-10-23 PROCEDURE — 86803 HEPATITIS C AB TEST: CPT

## 2022-10-23 PROCEDURE — 36415 COLL VENOUS BLD VENIPUNCTURE: CPT

## 2022-10-23 PROCEDURE — 83036 HEMOGLOBIN GLYCOSYLATED A1C: CPT

## 2022-10-23 PROCEDURE — 84443 ASSAY THYROID STIM HORMONE: CPT

## 2022-10-23 PROCEDURE — 80061 LIPID PANEL: CPT

## 2022-10-23 PROCEDURE — 80053 COMPREHEN METABOLIC PANEL: CPT

## 2022-10-24 LAB — HIV 1+2 AB+HIV1 P24 AG SERPL QL IA: NORMAL

## 2022-10-26 ENCOUNTER — OFFICE VISIT (OUTPATIENT)
Dept: FAMILY MEDICINE CLINIC | Facility: CLINIC | Age: 42
End: 2022-10-26
Payer: COMMERCIAL

## 2022-10-26 VITALS
RESPIRATION RATE: 18 BRPM | BODY MASS INDEX: 44.1 KG/M2 | TEMPERATURE: 96.7 F | HEIGHT: 71 IN | HEART RATE: 88 BPM | OXYGEN SATURATION: 96 % | WEIGHT: 315 LBS | SYSTOLIC BLOOD PRESSURE: 162 MMHG | DIASTOLIC BLOOD PRESSURE: 100 MMHG

## 2022-10-26 DIAGNOSIS — R73.03 PREDIABETES: ICD-10-CM

## 2022-10-26 DIAGNOSIS — E66.01 MORBID OBESITY WITH BMI OF 45.0-49.9, ADULT (HCC): ICD-10-CM

## 2022-10-26 DIAGNOSIS — Z23 NEED FOR VACCINATION: ICD-10-CM

## 2022-10-26 DIAGNOSIS — I10 PRIMARY HYPERTENSION: Primary | ICD-10-CM

## 2022-10-26 PROCEDURE — 90472 IMMUNIZATION ADMIN EACH ADD: CPT

## 2022-10-26 PROCEDURE — 90686 IIV4 VACC NO PRSV 0.5 ML IM: CPT

## 2022-10-26 PROCEDURE — 90471 IMMUNIZATION ADMIN: CPT

## 2022-10-26 PROCEDURE — 90715 TDAP VACCINE 7 YRS/> IM: CPT

## 2022-10-26 PROCEDURE — 99214 OFFICE O/P EST MOD 30 MIN: CPT | Performed by: FAMILY MEDICINE

## 2022-10-26 RX ORDER — NEBIVOLOL 5 MG/1
5 TABLET ORAL DAILY
Qty: 30 TABLET | Refills: 5 | Status: SHIPPED | OUTPATIENT
Start: 2022-10-26

## 2022-10-26 NOTE — PROGRESS NOTES
Assessment/Plan:    1  Primary hypertension  -     nebivolol (BYSTOLIC) 5 mg tablet; Take 1 tablet (5 mg total) by mouth daily  -     Ambulatory referral to clinical pharmacy; Future  -     Comprehensive metabolic panel; Future; Expected date: 04/01/2023  -     Lipid Panel with Direct LDL reflex; Future; Expected date: 04/01/2023  -     TSH, 3rd generation with Free T4 reflex; Future; Expected date: 04/01/2023    2  Prediabetes  Assessment & Plan:  a1c at 5  Discuss weight loss    Orders:  -     Ambulatory referral to clinical pharmacy; Future  -     Hemoglobin A1C; Future; Expected date: 04/01/2023    3  Morbid obesity with BMI of 45 0-49 9, adult [facility])  Assessment & Plan:  Refer to clinical pharmacist    Orders:  -     Ambulatory referral to clinical pharmacy; Future    4  Need for vaccination  -     influenza vaccine, quadrivalent, 0 5 mL, preservative-free, for adult and pediatric patients 6 mos+ (AFLURIA, FLUARIX, FLULAVAL, FLUZONE)  -     TDAP VACCINE GREATER THAN OR EQUAL TO 6YO IM        Depression Screening and Follow-up Plan: Patient was screened for depression during today's encounter  They screened negative with a PHQ-2 score of 0  There are no Patient Instructions on file for this visit  Return in about 6 months (around 4/26/2023) for Recheck  Subjective:      Patient ID: Robbin Martin is a 36 y o  male  Chief Complaint   Patient presents with   • Follow-up     Patient here for follow up        Here for follow up  bp's a little better at home  bp's not in range  Flu shot today  Labs reviewed      Hypertension  This is a chronic problem  The current episode started more than 1 year ago  The problem has been waxing and waning since onset  The problem is uncontrolled  There are no associated agents to hypertension  Past treatments include calcium channel blockers and angiotensin blockers  The current treatment provides mild improvement  There are no compliance problems          The following portions of the patient's history were reviewed and updated as appropriate: allergies, current medications, past family history, past medical history, past social history, past surgical history and problem list     Review of Systems   Constitutional: Negative  HENT: Negative  Eyes: Negative  Respiratory: Negative  Cardiovascular: Negative  Gastrointestinal: Negative  Endocrine: Negative  Genitourinary: Negative  Musculoskeletal: Negative  Skin: Negative  Allergic/Immunologic: Negative  Neurological: Negative  Hematological: Negative  Psychiatric/Behavioral: Negative  Current Outpatient Medications   Medication Sig Dispense Refill   • amLODIPine (NORVASC) 5 mg tablet TAKE 1 TABLET DAILY 90 tablet 3   • nebivolol (BYSTOLIC) 5 mg tablet Take 1 tablet (5 mg total) by mouth daily 30 tablet 5   • omeprazole (PriLOSEC) 40 MG capsule TAKE 1 CAPSULE DAILY 90 capsule 3   • valsartan-hydrochlorothiazide (DIOVAN-HCT) 320-25 MG per tablet Take 1 tablet by mouth daily 90 tablet 3   • famotidine (PEPCID) 20 mg tablet Take 1 tablet (20 mg total) by mouth 2 (two) times a day 180 tablet 3     No current facility-administered medications for this visit  Objective:    /100 (BP Location: Left arm, Patient Position: Sitting, Cuff Size: Large)   Pulse 88   Temp (!) 96 7 °F (35 9 °C) (Tympanic)   Resp 18   Ht 5' 11 3" (1 811 m)   Wt (!) 152 kg (334 lb 3 2 oz)   SpO2 96%   BMI 46 22 kg/m²        Physical Exam  Vitals and nursing note reviewed  Constitutional:       Appearance: Normal appearance  HENT:      Head: Normocephalic and atraumatic  Eyes:      Extraocular Movements: Extraocular movements intact  Pupils: Pupils are equal, round, and reactive to light  Cardiovascular:      Rate and Rhythm: Normal rate and regular rhythm  Pulses: Normal pulses  Heart sounds: Normal heart sounds     Pulmonary:      Effort: Pulmonary effort is normal  Breath sounds: Normal breath sounds  Abdominal:      General: Abdomen is flat  Palpations: Abdomen is soft  Musculoskeletal:         General: Normal range of motion  Cervical back: Normal range of motion and neck supple  Skin:     General: Skin is warm  Capillary Refill: Capillary refill takes less than 2 seconds  Neurological:      General: No focal deficit present  Mental Status: He is alert and oriented to person, place, and time  Psychiatric:         Mood and Affect: Mood normal          Behavior: Behavior normal          Thought Content:  Thought content normal          Judgment: Judgment normal                 Og Hinson

## 2022-10-28 ENCOUNTER — HOSPITAL ENCOUNTER (OUTPATIENT)
Dept: ULTRASOUND IMAGING | Facility: HOSPITAL | Age: 42
Discharge: HOME | End: 2022-10-28
Payer: COMMERCIAL

## 2022-10-28 DIAGNOSIS — Z87.438 HISTORY OF TORSION OF TESTIS: ICD-10-CM

## 2022-10-28 PROCEDURE — 76870 US EXAM SCROTUM: CPT

## 2022-10-31 ENCOUNTER — TELEPHONE (OUTPATIENT)
Dept: UROLOGY | Facility: CLINIC | Age: 42
End: 2022-10-31

## 2022-10-31 NOTE — TELEPHONE ENCOUNTER
Patient's ultrasound is reviewed  He has a prior history orchiopexy for testicular torsion  I recently performed a vasectomy  I felt that his testes was abnormal on palpation at the time of the procedure and ordered a scrotal ultrasound which was recently completed  This shows that his left testes is completely atrophic be on the spermatic cord  His right testes is normal     Patient has no signs of malignancy  If he is doing well at his next visit he can follow up as needed, and will need to complete his post vasectomy semen analysis

## 2022-11-10 ENCOUNTER — TELEPHONE (OUTPATIENT)
Dept: FAMILY MEDICINE CLINIC | Facility: CLINIC | Age: 42
End: 2022-11-10

## 2022-11-10 ENCOUNTER — CLINICAL SUPPORT (OUTPATIENT)
Dept: FAMILY MEDICINE CLINIC | Facility: CLINIC | Age: 42
End: 2022-11-10

## 2022-11-10 VITALS — SYSTOLIC BLOOD PRESSURE: 148 MMHG | DIASTOLIC BLOOD PRESSURE: 90 MMHG

## 2022-11-10 DIAGNOSIS — I10 HYPERTENSION, UNSPECIFIED TYPE: Primary | ICD-10-CM

## 2022-11-10 NOTE — TELEPHONE ENCOUNTER
Patient has been taking his Nebivolol in the evening  He was not sure if he should take it in the morning along with his Amlodipine   Please advise

## 2022-11-10 NOTE — PROGRESS NOTES
Name: Malry Thomas      : 1980      MRN: [id number]  Encounter Provider: Nurse Daisy PEREZ  Encounter Date: 11/10/2022   Encounter department: [place] [address]  Hypertension, unspecified type           Subjective      HPI  Review of Systems    Current Outpatient Medications on File Prior to Visit   Medication Sig   • amLODIPine (NORVASC) 5 mg tablet TAKE 1 TABLET DAILY   • famotidine (PEPCID) 20 mg tablet Take 1 tablet (20 mg total) by mouth 2 (two) times a day   • nebivolol (BYSTOLIC) 5 mg tablet Take 1 tablet (5 mg total) by mouth daily   • omeprazole (PriLOSEC) 40 MG capsule TAKE 1 CAPSULE DAILY   • valsartan-hydrochlorothiazide (DIOVAN-HCT) 320-25 MG per tablet Take 1 tablet by mouth daily       Objective     There were no vitals taken for this visit  Patient is taken his Nebivolol 5mg at night   Will advise PCP     Nurse Daisy PEREZ

## 2022-11-11 ENCOUNTER — OFFICE VISIT (OUTPATIENT)
Dept: UROLOGY | Facility: CLINIC | Age: 42
End: 2022-11-11

## 2022-11-11 VITALS
WEIGHT: 315 LBS | HEIGHT: 71 IN | OXYGEN SATURATION: 98 % | BODY MASS INDEX: 44.1 KG/M2 | RESPIRATION RATE: 16 BRPM | HEART RATE: 71 BPM | SYSTOLIC BLOOD PRESSURE: 122 MMHG | DIASTOLIC BLOOD PRESSURE: 78 MMHG

## 2022-11-11 DIAGNOSIS — Z87.438 HISTORY OF TORSION OF TESTIS: Primary | ICD-10-CM

## 2022-11-11 DIAGNOSIS — Z30.2 ENCOUNTER FOR STERILIZATION: ICD-10-CM

## 2022-11-11 NOTE — PROGRESS NOTES
1  History of torsion of testis     2  Encounter for sterilization       Assessment and plan:     1  Left testicular atrophy  - s/p left orchiopexy d/t torsion    2  Sterility  - s/p bilateral vasectomy 10/14/22  - healing well   - semen analysis testing reviewed  Counseled to continue contraception until semen analysis confirmed  F/u RIC Torres PA-C      Chief Complaint     S/p vasectomy     History of Present Illness     Julio Saavedra is a 36 y o  male presenting for follow up  S/p vasectomy 10/14/22 in the OR due to history of left testicular torsion s/p orchiopexy and BMP of 46  Known atrophic left testicle  Left testicle was felt to be abnormal therefore a f/u scrotal US (10/28/22)was performed which revealed: Fluid-filled left scrotal sac without a left testis or left epididymis identified, likely due to atrophy from prior left testicular torsion    Healing well  Denies any residual swelling, discomfort, or concerns  Laboratory     Lab Results   Component Value Date    CREATININE 1 21 10/23/2022       Review of Systems     Review of Systems   Constitutional: Negative for activity change, appetite change, chills, diaphoresis, fatigue, fever and unexpected weight change  Respiratory: Negative for chest tightness and shortness of breath  Cardiovascular: Negative for chest pain, palpitations and leg swelling  Gastrointestinal: Negative for abdominal distention, abdominal pain, constipation, diarrhea, nausea and vomiting  Genitourinary: Negative for decreased urine volume, difficulty urinating, dysuria, enuresis, flank pain, frequency, genital sores, hematuria and urgency  Musculoskeletal: Negative for back pain, gait problem and myalgias  Skin: Negative for color change, pallor, rash and wound  Psychiatric/Behavioral: Negative for behavioral problems  The patient is not nervous/anxious            AUA SYMPTOM SCORE    Flowsheet Row Most Recent Value   AUA SYMPTOM SCORE    How often have you had a sensation of not emptying your bladder completely after you finished urinating? 0 (P)     How often have you had to urinate again less than two hours after you finished urinating? 0 (P)     How often have you found you stopped and started again several times when you urinate? 0 (P)     How often have you found it difficult to postpone urination? 0 (P)     How often have you had a weak urinary stream? 0 (P)     How often have you had to push or strain to begin urination? 0 (P)     How many times did you most typically get up to urinate from the time you went to bed at night until the time you got up in the morning? 0 (P)     Quality of Life: If you were to spend the rest of your life with your urinary condition just the way it is now, how would you feel about that? 1 (P)     AUA SYMPTOM SCORE 0 (P)           Allergies     Allergies   Allergen Reactions   • Ace Inhibitors Cough       Physical Exam     Physical Exam  Constitutional:       General: He is not in acute distress  Appearance: Normal appearance  He is obese  He is not ill-appearing, toxic-appearing or diaphoretic  HENT:      Head: Normocephalic and atraumatic  Eyes:      General:         Right eye: No discharge  Left eye: No discharge  Conjunctiva/sclera: Conjunctivae normal    Pulmonary:      Effort: Pulmonary effort is normal  No respiratory distress  Genitourinary:     Comments: Incision fully closed  No sperm granulomas  Lobular left testicle  Musculoskeletal:         General: No swelling or tenderness  Normal range of motion  Skin:     General: Skin is warm and dry  Coloration: Skin is not jaundiced or pale  Neurological:      General: No focal deficit present  Mental Status: He is alert and oriented to person, place, and time  Psychiatric:         Mood and Affect: Mood normal          Behavior: Behavior normal          Thought Content:  Thought content normal            Vital Signs     Vitals:    11/11/22 0807   BP: 122/78   BP Location: Left arm   Patient Position: Sitting   Cuff Size: Large   Pulse: 71   Resp: 16   SpO2: 98%   Weight: (!) 154 kg (340 lb 3 2 oz)   Height: 5' 11" (1 803 m)         Current Medications       Current Outpatient Medications:   •  amLODIPine (NORVASC) 5 mg tablet, TAKE 1 TABLET DAILY, Disp: 90 tablet, Rfl: 3  •  famotidine (PEPCID) 20 mg tablet, Take 1 tablet (20 mg total) by mouth 2 (two) times a day, Disp: 180 tablet, Rfl: 3  •  nebivolol (BYSTOLIC) 5 mg tablet, Take 1 tablet (5 mg total) by mouth daily, Disp: 30 tablet, Rfl: 5  •  omeprazole (PriLOSEC) 40 MG capsule, TAKE 1 CAPSULE DAILY, Disp: 90 capsule, Rfl: 3  •  valsartan-hydrochlorothiazide (DIOVAN-HCT) 320-25 MG per tablet, Take 1 tablet by mouth daily, Disp: 90 tablet, Rfl: 3      Active Problems     Patient Active Problem List   Diagnosis   • GERD (gastroesophageal reflux disease)   • Hiatal hernia   • Hypertension   • Morbid obesity with BMI of 45 0-49 9, adult ([facility])   • Obstructive sleep apnea   • Well adult exam   • Prediabetes   • Annual physical exam   • Encounter for sterilization   • Muscle spasm of left shoulder   • History of torsion of testis   • Screening for HIV (human immunodeficiency virus)         Past Medical History     Past Medical History:   Diagnosis Date   • Chronic low back pain     Last Assessed: 2/10/2016    • CPAP (continuous positive airway pressure) dependence    • GERD (gastroesophageal reflux disease)    • Hypertension    • Lump of skin     Last Assessed: 2/10/2016    • Obesity 05/22/2013   • Sleep apnea    • Somatic dysfunction of lumbar region     Last Assessed: 2/18/2016    • Whiplash injury to neck     Last Assessed: 2/18/2016          Surgical History     Past Surgical History:   Procedure Laterality Date   • APPENDECTOMY     • LA REMOVAL OF SPERM DUCT(S) Bilateral 10/14/2022    Procedure: VASECTOMY;  Surgeon: Latha Wheeler MD;  Location: [facility] MAIN OR; Service: Urology   • REDUCTION OF TORSION OF TESTIS  1994   • SURGERY SCROTAL / TESTICULAR     • UPPER GASTROINTESTINAL ENDOSCOPY     • WISDOM TOOTH EXTRACTION           Family History     Family History   Problem Relation Age of Onset   • Glaucoma Mother    • Hypertension Mother    • Hypertension Father    • Stroke Maternal Grandmother    • Heart disease Neg Hx    • Diabetes Neg Hx    • Thyroid disease Neg Hx    • Cancer Neg Hx          Social History     Social History       Radiology

## 2022-11-15 ENCOUNTER — CLINICAL SUPPORT (OUTPATIENT)
Dept: FAMILY MEDICINE CLINIC | Facility: CLINIC | Age: 42
End: 2022-11-15

## 2022-11-15 DIAGNOSIS — E66.01 MORBID OBESITY WITH BMI OF 45.0-49.9, ADULT (HCC): Primary | ICD-10-CM

## 2022-11-15 DIAGNOSIS — R73.03 PREDIABETES: ICD-10-CM

## 2022-11-15 NOTE — PROGRESS NOTES
[address] [facility] Mike Knox Pharmacist    Dana Berumen is a 36 y o  male who was referred to the pharmacist for prediabetes/obesity/hypertension education and management, referred by Charles Leyva DO   Telemedicine consent  The patient was identified by name and date of birth  Dana Berumen was informed that this is a telemedicine visit and that the visit is being conducted through Telephone  My office door was closed  No one else was in the room  He acknowledged consent and understanding of privacy and security of the video platform  The patient has agreed to participate and understands they can discontinue the visit at any time  Assessment/ Plan       Prediabetes:  Lifestyle Counseling provided  Metformin recommended  Given concurrent disease states and prevalent obesity, will pursue authorization for alternate GLP1RA    Changes to Medication Regimen: If PCP is in agreement with plan  Will pursue authorization of Trulicity    Most recent labs and diabetes goals discussed   Materials Provided: none  Labs Ordered: none       2  Need for Additional Therapies:  Statin: continue to monitor for necessity   ACEI/ARB: on valsartan  ASA: not indicated    3  Health Maintenance:  Vaccine recommendations: none  Eye Exam: not indicated  Foot Exam: not indicated  Urine Microalbumin: not indicated    4  Medication Reconciliation:   Medication list reviewed with pt at today's visit  Discrepancies as discussed below  Medication list updated to reflect medications pt is currently taking  Renewal request sent to prescribing provider for: none       5  Hyperlipidemia without clinical ASCVD event:   2018 ACC/AHA lipid guidelines recommend low/moderate statin  Will continue to monitor at future visits    6  HTN:   BP goal less than 140/90 mmHg  In office BP above goal, HR acceptable  Patient with room for lifestyle modifications  Serum K+ WNL       Monitoring: home bp, home weight    Referrals placed at this visit: none    Follow-up: pending authorization for GLP1RA        Subjective   Attempted authorization for Ozempic - denied  Denial stated alternative covered is Trulicity  Will pursue prior authorization on basis of prediabetes, obesity, and hypertension  Patient endorses no family history of pancreatitis or thyroid cancer  Will be getting different insurance with new job, start date mid December  May need to reapprove and bridge with samples  Medication Adherence/ Tolerability:  Patient did start the nebivolol and has not had any side effects  The patient reports missing 0 doses of medication in the past 2 weeks  2  Lifestyle:   Last visit with dietician: n/a  Diet Recall:   Breakfast:   Lunch:   Dinner:   Snacks:   Beverages:   Physical Activity: limited          Objective       Current Outpatient Medications   Medication Instructions   • amLODIPine (NORVASC) 5 mg tablet TAKE 1 TABLET DAILY   • famotidine (PEPCID) 20 mg, Oral, 2 times daily   • nebivolol (BYSTOLIC) 5 mg, Oral, Daily   • omeprazole (PriLOSEC) 40 MG capsule TAKE 1 CAPSULE DAILY   • valsartan-hydrochlorothiazide (DIOVAN-HCT) 320-25 MG per tablet 1 tablet, Oral, Daily     Allergies   Allergen Reactions   • Ace Inhibitors Cough       Immunization History   Administered Date(s) Administered   • COVID-19 PFIZER VACCINE 0 3 ML IM 03/14/2021, 04/05/2021, 12/16/2021   • Influenza Quadrivalent Preservative Free 3 years and older IM 09/22/2014   • Influenza Quadrivalent, 6-35 Months IM 09/25/2015   • Influenza, injectable, quadrivalent, preservative free 0 5 mL 11/01/2018, 10/24/2019, 01/25/2021, 12/14/2021, 10/26/2022   • Influenza, seasonal, injectable 01/21/2013   • Tdap 09/20/2012, 10/26/2022       I have reviewed the patient's allergies, medications and history as noted in the electronic medical record and updated as necessary      Lab Results   Component Value Date    HGBA1C 6 0 (H) 10/23/2022 HGBA1C 5 8 (H) 12/14/2021    HGBA1C 5 5 02/28/2021     ASCVD Risk:  The 10-year ASCVD risk score (Maria D Leon et al , 2013) is: 5 9%    Values used to calculate the score:      Age: 36 years      Sex: Male      Is Non- : Yes      Diabetic: No      Tobacco smoker: No      Systolic Blood Pressure: 240 mmHg      Is BP treated: Yes      HDL Cholesterol: 41 mg/dL      Total Cholesterol: 193 mg/dL     Vitals: There were no vitals filed for this visit      Labs:    Lab Results   Component Value Date    SODIUM 140 10/23/2022    K 3 9 10/23/2022    EGFR 72 10/23/2022    CREATININE 1 21 10/23/2022    GLUF 91 10/23/2022         Pharmacist Tracking Tool     Pharmacist Tracking Tool  Reason For Outreach: Embedded Pharmacist  Demographics:  Intervention Method: Phone  Type of Intervention: Follow-Up  Topics Addressed: Diabetes, Hypertension and Obesity  Pharmacologic Interventions: Medication Initiation  Non-Pharmacologic Interventions: Adherence addressed, Care coordination, Chart update, Cost, Disease state education, Home Monitoring, Labs and Medication/Device education  Time:  Direct Patient Care: 30 mins  Care Coordination: 30 mins  Recommendation Recipient: Patient/Caregiver  Outcome: Accepted

## 2022-11-27 RX ORDER — SODIUM CHLORIDE 9 MG/ML
50 INJECTION, SOLUTION INTRAVENOUS CONTINUOUS
Status: CANCELLED | OUTPATIENT
Start: 2022-11-27

## 2022-11-28 ENCOUNTER — HOSPITAL ENCOUNTER (OUTPATIENT)
Dept: GASTROENTEROLOGY | Facility: HOSPITAL | Age: 42
Setting detail: OUTPATIENT SURGERY
Discharge: HOME | End: 2022-11-28
Attending: INTERNAL MEDICINE | Admitting: INTERNAL MEDICINE

## 2022-11-28 ENCOUNTER — ANESTHESIA (OUTPATIENT)
Dept: GASTROENTEROLOGY | Facility: HOSPITAL | Age: 42
End: 2022-11-28

## 2022-11-28 ENCOUNTER — ANESTHESIA EVENT (OUTPATIENT)
Dept: GASTROENTEROLOGY | Facility: HOSPITAL | Age: 42
End: 2022-11-28

## 2022-11-28 VITALS
TEMPERATURE: 96.3 F | RESPIRATION RATE: 16 BRPM | OXYGEN SATURATION: 95 % | WEIGHT: 315 LBS | HEIGHT: 71 IN | HEART RATE: 68 BPM | BODY MASS INDEX: 44.1 KG/M2 | DIASTOLIC BLOOD PRESSURE: 69 MMHG | SYSTOLIC BLOOD PRESSURE: 138 MMHG

## 2022-11-28 DIAGNOSIS — K44.9 HIATAL HERNIA: ICD-10-CM

## 2022-11-28 DIAGNOSIS — K21.9 GERD WITHOUT ESOPHAGITIS: ICD-10-CM

## 2022-11-28 DIAGNOSIS — K21.9 GASTROESOPHAGEAL REFLUX DISEASE, UNSPECIFIED WHETHER ESOPHAGITIS PRESENT: ICD-10-CM

## 2022-11-28 DIAGNOSIS — K21.00 GASTROESOPHAGEAL REFLUX DISEASE WITH ESOPHAGITIS WITHOUT HEMORRHAGE: Primary | ICD-10-CM

## 2022-11-28 RX ORDER — SODIUM CHLORIDE 9 MG/ML
INJECTION, SOLUTION INTRAVENOUS CONTINUOUS PRN
Status: DISCONTINUED | OUTPATIENT
Start: 2022-11-28 | End: 2022-11-28

## 2022-11-28 RX ORDER — GLYCOPYRROLATE 0.2 MG/ML
INJECTION INTRAMUSCULAR; INTRAVENOUS AS NEEDED
Status: DISCONTINUED | OUTPATIENT
Start: 2022-11-28 | End: 2022-11-28

## 2022-11-28 RX ORDER — PROPOFOL 10 MG/ML
INJECTION, EMULSION INTRAVENOUS AS NEEDED
Status: DISCONTINUED | OUTPATIENT
Start: 2022-11-28 | End: 2022-11-28

## 2022-11-28 RX ORDER — OMEPRAZOLE 40 MG/1
40 CAPSULE, DELAYED RELEASE ORAL 2 TIMES DAILY
Qty: 180 CAPSULE | Refills: 3 | Status: SHIPPED | OUTPATIENT
Start: 2022-11-28

## 2022-11-28 RX ORDER — SODIUM CHLORIDE 9 MG/ML
50 INJECTION, SOLUTION INTRAVENOUS CONTINUOUS
Status: DISCONTINUED | OUTPATIENT
Start: 2022-11-28 | End: 2022-12-02 | Stop reason: HOSPADM

## 2022-11-28 RX ORDER — FENTANYL CITRATE 50 UG/ML
INJECTION, SOLUTION INTRAMUSCULAR; INTRAVENOUS AS NEEDED
Status: DISCONTINUED | OUTPATIENT
Start: 2022-11-28 | End: 2022-11-28

## 2022-11-28 RX ORDER — LIDOCAINE HYDROCHLORIDE 20 MG/ML
INJECTION, SOLUTION EPIDURAL; INFILTRATION; INTRACAUDAL; PERINEURAL AS NEEDED
Status: DISCONTINUED | OUTPATIENT
Start: 2022-11-28 | End: 2022-11-28

## 2022-11-28 RX ADMIN — SODIUM CHLORIDE: 0.9 INJECTION, SOLUTION INTRAVENOUS at 10:40

## 2022-11-28 RX ADMIN — LIDOCAINE HYDROCHLORIDE 100 MG: 20 INJECTION, SOLUTION EPIDURAL; INFILTRATION; INTRACAUDAL; PERINEURAL at 10:45

## 2022-11-28 RX ADMIN — GLYCOPYRROLATE 0.2 MCG: 0.2 INJECTION, SOLUTION INTRAMUSCULAR; INTRAVENOUS at 10:45

## 2022-11-28 RX ADMIN — PROPOFOL 150 MG: 10 INJECTION, EMULSION INTRAVENOUS at 10:45

## 2022-11-28 RX ADMIN — PROPOFOL 50 MG: 10 INJECTION, EMULSION INTRAVENOUS at 10:52

## 2022-11-28 RX ADMIN — PROPOFOL 50 MG: 10 INJECTION, EMULSION INTRAVENOUS at 10:49

## 2022-11-28 RX ADMIN — PROPOFOL 50 MG: 10 INJECTION, EMULSION INTRAVENOUS at 10:55

## 2022-11-28 RX ADMIN — FENTANYL CITRATE 50 MCG: 50 INJECTION INTRAMUSCULAR; INTRAVENOUS at 10:45

## 2022-11-28 RX ADMIN — FENTANYL CITRATE 50 MCG: 50 INJECTION INTRAMUSCULAR; INTRAVENOUS at 10:48

## 2022-11-28 NOTE — H&P
History and Physical -  [facility]  Betina Portillo 36 y o  male MRN: [id number]                  HPI: Betina Portillo is a 36y o  year old male who presents for GERD      REVIEW OF SYSTEMS: Per the HPI, and otherwise unremarkable      Historical Information   Past Medical History:   Diagnosis Date   • Chronic low back pain     Last Assessed: 2/10/2016    • CPAP (continuous positive airway pressure) dependence    • GERD (gastroesophageal reflux disease)    • Hypertension    • Lump of skin     Last Assessed: 2/10/2016    • Obesity 05/22/2013   • Sleep apnea    • Somatic dysfunction of lumbar region     Last Assessed: 2/18/2016    • Whiplash injury to neck     Last Assessed: 2/18/2016      Past Surgical History:   Procedure Laterality Date   • APPENDECTOMY     • VT REMOVAL OF SPERM DUCT(S) Bilateral 10/14/2022    Procedure: Barbara Keating;  Surgeon: Carl Torres MD;  [facility] MA[facility];  Service: Urology   • REDUCTION OF TORSION OF TESTIS  1994   • SURGERY SCROTAL / TESTICULAR     • UPPER GASTROINTESTINAL ENDOSCOPY     • WISDOM TOOTH EXTRACTION       Social History   Social History     Substance and Sexual Activity   Alcohol Use Yes    Comment: once per month only     Social History     Substance and Sexual Activity   Drug Use Never     Social History     Tobacco Use   Smoking Status Never   Smokeless Tobacco Never   Tobacco Comments    1 cigar weekly/ Per allscripts: occasional tobacco smoker      Family History   Problem Relation Age of Onset   • Glaucoma Mother    • Hypertension Mother    • Hypertension Father    • Stroke Maternal Grandmother    • Heart disease Neg Hx    • Diabetes Neg Hx    • Thyroid disease Neg Hx    • Cancer Neg Hx        Meds/Allergies       Current Outpatient Medications:   •  amLODIPine (NORVASC) 5 mg tablet  •  valsartan-hydrochlorothiazide (DIOVAN-HCT) 320-25 MG per tablet  •  Dulaglutide 0 75 MG/0 5ML SOPN  •  famotidine (PEPCID) 20 mg tablet  •  nebivolol (BYSTOLIC) 5 mg tablet  •  omeprazole (PriLOSEC) 40 MG capsule    Current Facility-Administered Medications:   •  sodium chloride 0 9 % infusion, 50 mL/hr, Intravenous, Continuous    Allergies   Allergen Reactions   • Ace Inhibitors Cough       Objective     /80   Pulse 76   Temp 97 5 °F (36 4 °C) (Temporal)   Resp 18   Ht 5' 11" (1 803 m)   Wt (!) 154 kg (340 lb)   SpO2 99%   BMI 47 42 kg/m²       PHYSICAL EXAM    Gen: NAD  Head: NCAT  CV: RRR  CHEST: Clear  ABD: soft, NT/ND  EXT: no edema      ASSESSMENT/PLAN:  This is a 36y o  year old male here for EGD, and he is stable and optimized for his procedure

## 2022-11-28 NOTE — ANESTHESIA PREPROCEDURE EVALUATION
Procedure:  EGD    Relevant Problems   CARDIO   (+) Hypertension      GI/HEPATIC   (+) GERD (gastroesophageal reflux disease)   (+) Hiatal hernia      NEURO/PSYCH   (+) History of torsion of testis      PULMONARY   (+) Obstructive sleep apnea      Other   (+) Morbid obesity with BMI of 45 0-49 9, adult [facility])        Physical Exam    Airway    Mallampati score: II         Dental   Comment: Upper and lower braces ,     Cardiovascular  Cardiovascular exam normal    Pulmonary  Pulmonary exam normal     Other Findings        Anesthesia Plan  ASA Score- 3     Anesthesia Type- IV sedation with anesthesia with ASA Monitors  Additional Monitors:   Airway Plan:           Plan Factors-Exercise tolerance (METS): >4 METS  Chart reviewed  Existing labs reviewed  Patient summary reviewed  Patient is a current smoker  Patient instructed to abstain from smoking on day of procedure  Patient smoked on day of surgery  Induction-     Postoperative Plan-     Informed Consent- Anesthetic plan and risks discussed with patient  I personally reviewed this patient with the CRNA  Discussed and agreed on the Anesthesia Plan with the CRNA             Lab Results   Component Value Date    HGBA1C 6 0 (H) 10/23/2022       Lab Results   Component Value Date     02/02/2016    K 3 9 10/23/2022     10/23/2022    CO2 31 10/23/2022    ANIONGAP 3 (L) 03/23/2015    BUN 14 10/23/2022    CREATININE 1 21 10/23/2022    GLUCOSE 98 03/23/2015    GLUF 91 10/23/2022    CALCIUM 9 1 10/23/2022    CORRECTEDCA 9 5 12/14/2021    AST 16 10/23/2022    ALT 14 10/23/2022    ALKPHOS 59 10/23/2022    PROT 7 6 02/02/2016    BILITOT 0 8 02/02/2016    EGFR 72 10/23/2022       Lab Results   Component Value Date    WBC 6 96 02/28/2021    HGB 15 1 02/28/2021    HCT 47 2 02/28/2021    MCV 86 02/28/2021     02/28/2021

## 2023-01-10 ENCOUNTER — CLINICAL SUPPORT (OUTPATIENT)
Dept: FAMILY MEDICINE CLINIC | Facility: CLINIC | Age: 43
End: 2023-01-10

## 2023-01-10 DIAGNOSIS — E66.01 MORBID OBESITY WITH BMI OF 45.0-49.9, ADULT (HCC): Primary | ICD-10-CM

## 2023-01-10 NOTE — PROGRESS NOTES
[place]       ASSESSMENT/PLAN                                                                                     Obesity:    Patient has  been working on lifestyle modifications for >6 months; weight has been increasing  Contraindications to the following medication options: none    Baseline Weight: 340lbs  Current weight loss: - XX pounds (-XX%)  5% weight loss goal (to be met at week 12): 17lbs (323)  Initiation or Continuation of Therapy: Initation  Comorbid CV Risk Factors: HLD, HTN, Prediabetes, AMBROSE    Medications: If PCP is in agreeance, Start [facility]SISSY CHIU  Will pend rx for PCP signature/concurrence  Lifestyle:   BMI Counseling: There is no height or weight on file to calculate BMI  The BMI is above normal  Pharmacotherapy was ordered for patient to aid in weight loss  Referral: none  Labs: none      Wegovy, Saxenda: savings card (pay as little as $25/month or reduce cost by $200)    Discontinuation of therapy: If the patient has not lost at least 5% of baseline body weight after 12 weeks at the maintenance dosage, discontinue therapy; clinically meaningful weight loss is unlikely with continued treatment  2  Medication Reconciliation: Medication list reviewed with pt at today's visit  Discrepancies as discussed below     - Medication list updated to reflect medications pt is currently taking    Follow-Up: patient to send RX insurance information for likely prior authorization      SUBJECTIVE                                                                                                              Medication Adherence: Medication list reviewed with patient, reports the following discrepancies/problems:none  amLODIPine  nebivolol  omeprazole  Semaglutide-Weight Management  valsartan-hydrochlorothiazide:         Kidney Stone History: none      OBJECTIVE [facility]  Follow up note. Chief Complaint:  Natalie Cohen is a 63 y.o.  female who presents for follow up of open wound of abdomen, she had open repair of incarcerated incisional hernia with mesh on 09/07/2022. She developed infection and wound dehiscence at incision site. She underwent I&D of abdominal wall abscess on 10/12/2022. Wound cultures grew MRSA. She completed course of linezolid. Review of systems  General: No fevers or chills. Cardiovascular: No chest pain or pressure. No palpitations. Pulmonary: No shortness of breath. Gastrointestinal: No nausea, vomiting. Derm: See above                Physical Exam:     See flow sheet  General: well developed, well nourished, pleasant. Psych: cooperative. Pleasant. Neuro: alert and oriented to person/place/situation. Otherwise nonfocal.  Derm: Skin turgor normal for age, dry skin  HEENT: Normocephalic, atraumatic. EOMI. Conjunctiva clear. No scleral icterus. Chest:  Respirations non labored  Abdomen: Soft, non tender. Lower extremities: color normal; temperature normal. Calves are supple, nontender. Capillary refill <3 sec      Wound Description:   Wound Length:      Wound Width :     Wound Depth :     Wound Abdomen   No Date First Assessed or Time First Assessed found.    Primary Wound Type: Open incision/surgical site  Location: Abdomen   Wound Image                Wound Etiology Surgical   Dressing Status Intact   Cleansed Wound cleanser   Dressing/Treatment Collagen with Ag;Negative Pressure Wound Therapy   Dressing Change Due 11/23/22   Wound Length (cm) 3.9 cm   Wound Width (cm) 1.8 cm   Wound Depth (cm) 1 cm   Wound Surface Area (cm^2) 7.02 cm^2   Change in Wound Size % 70.75   Wound Volume (cm^3) 7.02 cm^3   Wound Healing % 94   Post-Procedure Length (cm) 4 cm   Post-Procedure Width (cm) 1.9 cm   Post-Procedure Depth (cm) 1 cm   Post-Procedure Surface Area (cm^2) 7.6 cm^2   Post-Procedure Volume (cm^3) 7.6 cm^3 Distance Tunneling (cm) 1.7 cm   Direction of Tunnel 12 o'clock   Wound Assessment Devitalized tissue;Granulation tissue   Drainage Amount Moderate   Drainage Description Serosanguinous   Wound Odor None   Na-Wound/Incision Assessment Intact   Edges Defined edges   Wound Thickness Description Full thickness           Data Review:   No results found for this or any previous visit (from the past 24 hour(s)). Assessment:     Patient Active Problem List   Diagnosis Code    Right knee DJD M17.11    Status post gastric bypass for obesity Z98.84    Intestinal malabsorption K90.9    Morbid obesity with body mass index (BMI) of 50.0 to 59.9 in adult ([facility]) E66.01, Z68.43    Hypertension I10    Arthritis M19.90    Asthma J45.909    GERD (gastroesophageal reflux disease) K21.9    Lymph edema I89.0    Hypercholesterolemia E78.00    Smoking history Z87.891    Right foot pain M79.671    Incisional hernia K43.2    Deep postoperative wound infection T81.42XA    Open wound of abdomen S31.109A     63 y.o. female with open wound of abdomen. Needs :  Serial debridement-   Good local wound care  Plan:       Continue with wound vac    Informed consent obtained. Patient/family member explained about the need of the procedure.   Consent in chart  Debridement:   Excisional debridement of open wound of abdomen  Indication: to remove necrotic tissue/ devitalized tissue/ soft eschar/ infected tissue through subcutaneous tissue epidermis and dermis layer of wound bed  Anesthesia: Topical 2% lidocaine jelly   Instrument: Curette, Blade,    Residual Necrosis: Present and scored  Bleeding: <1ml   Hemostasis: Pressure   Patient tolerated procedure well   Procedural Pain: mild  Post - procedural pain: mild     Post debridement measurements: see above  Surface area debrided: 7 sq. cm    In wound care clinic today:  Cleanse wound with NS or soap and water or commercial wound cleanser  Apply the following topically to wound bed: Previous Weight Loss medications trialed:  none    Wt Readings from Last 5 Encounters:   11/28/22 (!) 154 kg (340 lb)   11/11/22 (!) 154 kg (340 lb 3 2 oz)   10/26/22 (!) 152 kg (334 lb 3 2 oz)   10/14/22 (!) 151 kg (333 lb)   09/27/22 (!) 152 kg (336 lb)       BP Readings from Last 3 Encounters:   11/28/22 138/69   11/11/22 122/78   11/10/22 148/90       Pulse Readings from Last 3 Encounters:   11/28/22 68   11/11/22 71   10/26/22 88       Pertinent Lab Data:  Lab Results   Component Value Date     02/02/2016    SODIUM 140 10/23/2022    K 3 9 10/23/2022     10/23/2022    CO2 31 10/23/2022    ANIONGAP 3 (L) 03/23/2015    AGAP 5 10/23/2022    BUN 14 10/23/2022    CREATININE 1 21 10/23/2022    GLUF 91 10/23/2022    CALCIUM 9 1 10/23/2022    AST 16 10/23/2022    ALT 14 10/23/2022    ALKPHOS 59 10/23/2022    PROT 7 6 02/02/2016    TP 7 3 10/23/2022    BILITOT 0 8 02/02/2016    TBILI 0 54 10/23/2022    EGFR 72 10/23/2022         Pharmacist Tracking Tool  Reason For Outreach: Embedded Pharmacist  Demographics:  Intervention Method: Phone  Type of Intervention: Follow-Up  Topics Addressed: Obesity  Pharmacologic Interventions: Medication Initiation  Non-Pharmacologic Interventions: Care coordination, Cost, Disease state education, Home Monitoring and Medication/Device education  Time:  Direct Patient Care: 15 mins   Care Coordination: 30 mins  Recommendation Recipient: Patient/Caregiver  Outcome: Accepted [place]  Apply the following to whitney-wound: NA  Apply the following dressings: Absorptive dressing     For Home Care/Self Care:  Cleanse wound with NS or soap and water or commercial wound cleanser  Keep dressing dry and intact when bathing  Apply the following to wound bed: barrington  Apply the following to skin around wound: NA  Apply the following dressings: Absorptive dressing           Patient to return for wound care in:  7 Days  Follow up with Nurse visit as recommended. PLEASE CONTACT OFFICE AS SOON AS POSSIBLE IF UNABLE TO MAKE THIS APPOINTMENT. Inspect your wounds, looking for signs of infection which may include the following:  Increase in redness  Red \"streaks\" from wound  Increase in swelling       Fever  Unusual odor          Change in the amount of wound drainage. Should you experience any significant changes in your wound(s) or have any questions regarding your home care instructions please contact the wound center or your home health company. If after regular business hours, please call your family doctor or local emergency room. Edema Control:   Elevate legs as much as possible. Avoid standing in one position for more than 10 minutes. Avoid setting with legs down. Do not cross legs while sitting. Off-Loading:     Frequent position changes. Do not cross legs while sitting. Shift weight every 20 minutes or more when sitting for prolonged periods of time.     Signed By: Salena Guan MD     November 17, 2022

## 2023-01-26 DIAGNOSIS — I10 ESSENTIAL HYPERTENSION: ICD-10-CM

## 2023-01-26 RX ORDER — VALSARTAN AND HYDROCHLOROTHIAZIDE 320; 25 MG/1; MG/1
TABLET, FILM COATED ORAL
Qty: 90 TABLET | Refills: 3 | Status: SHIPPED | OUTPATIENT
Start: 2023-01-26

## 2023-01-30 DIAGNOSIS — I10 ESSENTIAL HYPERTENSION: ICD-10-CM

## 2023-01-30 RX ORDER — AMLODIPINE BESYLATE 5 MG/1
5 TABLET ORAL DAILY
Qty: 90 TABLET | Refills: 0 | Status: SHIPPED | OUTPATIENT
Start: 2023-01-30

## 2023-02-01 ENCOUNTER — TELEPHONE (OUTPATIENT)
Dept: GASTROENTEROLOGY | Facility: HOSPITAL | Age: 43
End: 2023-02-01

## 2023-02-08 ENCOUNTER — DOCUMENTATION (OUTPATIENT)
Dept: FAMILY MEDICINE CLINIC | Facility: CLINIC | Age: 43
End: 2023-02-08

## 2023-02-09 ENCOUNTER — HOSPITAL ENCOUNTER (OUTPATIENT)
Dept: GASTROENTEROLOGY | Facility: HOSPITAL | Age: 43
Discharge: HOME | End: 2023-02-09
Attending: INTERNAL MEDICINE

## 2023-02-09 VITALS
RESPIRATION RATE: 18 BRPM | HEART RATE: 62 BPM | OXYGEN SATURATION: 98 % | DIASTOLIC BLOOD PRESSURE: 90 MMHG | TEMPERATURE: 98.4 F | SYSTOLIC BLOOD PRESSURE: 149 MMHG

## 2023-02-09 DIAGNOSIS — K21.00 GASTROESOPHAGEAL REFLUX DISEASE WITH ESOPHAGITIS WITHOUT HEMORRHAGE: ICD-10-CM

## 2023-02-09 DIAGNOSIS — K44.9 HIATAL HERNIA: ICD-10-CM

## 2023-02-09 NOTE — PERIOPERATIVE NURSING NOTE
Patient brought in the room and educated on procedure  Lidocaine 2% topical solution inserted via nostrils  Manometry catheter placed via right nostril and secured  10 liquid swallows, 1 rapid swallow,5 upright swallows and 1 rapid drink challenge with 100 cc of water performed  Patient tolerated procedure and catheter removed intact  PH probe inserted via the right nostril and secured  Zephr recorder teach back performed and patient verbalized understanding  Patient instructed to return next day to have probe removed  Discharge instructions given and patient ambulated out of the room in stable condition

## 2023-02-16 NOTE — PROGRESS NOTES
It was just a fax notification from the pharmacy requesting a refill of the initial dose as prescribed  No form to complete

## 2023-02-21 ENCOUNTER — CLINICAL SUPPORT (OUTPATIENT)
Dept: FAMILY MEDICINE CLINIC | Facility: CLINIC | Age: 43
End: 2023-02-21

## 2023-02-21 DIAGNOSIS — E66.01 MORBID OBESITY WITH BMI OF 45.0-49.9, ADULT (HCC): Primary | ICD-10-CM

## 2023-02-21 NOTE — PROGRESS NOTES
[place]       ASSESSMENT/PLAN                                                                                     Obesity:    Patient has  been working on lifestyle modifications for >6 months; weight has been increasing  Contraindications to the following medication options: none    Baseline Weight: 340lbs  Current weight loss: - XX pounds (-XX%)  5% weight loss goal (to be met at week 12): 17lbs (323)  Initiation or Continuation of Therapy: Initation  Comorbid CV Risk Factors: HLD, HTN, Prediabetes, AMBROSE    Medications: If PCP is in agreeance, Start [place]  Will pend rx for PCP signature/concurrence  Lifestyle:   BMI Counseling: There is no height or weight on file to calculate BMI  The BMI is above normal  Pharmacotherapy was ordered for patient to aid in weight loss  Referral: none  Labs: none      Wegovy, Saxenda: savings card (pay as little as $25/month or reduce cost by $200)    Discontinuation of therapy: If the patient has not lost at least 5% of baseline body weight after 12 weeks at the maintenance dosage, discontinue therapy; clinically meaningful weight loss is unlikely with continued treatment  2  Medication Reconciliation: Medication list reviewed with pt at today's visit  Discrepancies as discussed below     - Medication list updated to reflect medications pt is currently taking    Follow-Up: 2-3 weeks for side effects and efficacy, will need weight check scheduled at 12 week steve      SUBJECTIVE                                                                                                              Medication Adherence: Medication list reviewed with patient, reports the following discrepancies/problems:none  amLODIPine  nebivolol  omeprazole  Semaglutide-Weight Management  valsartan-hydrochlorothiazide:         Kidney Stone History: none      OBJECTIVE Previous Weight Loss medications trialed:  none    Wt Readings from Last 5 Encounters:   11/28/22 (!) 154 kg (340 lb)   11/11/22 (!) 154 kg (340 lb 3 2 oz)   10/26/22 (!) 152 kg (334 lb 3 2 oz)   10/14/22 (!) 151 kg (333 lb)   09/27/22 (!) 152 kg (336 lb)       BP Readings from Last 3 Encounters:   02/09/23 149/90   11/28/22 138/69   11/11/22 122/78       Pulse Readings from Last 3 Encounters:   02/09/23 62   11/28/22 68   11/11/22 71       Pertinent Lab Data:  Lab Results   Component Value Date     02/02/2016    SODIUM 140 10/23/2022    K 3 9 10/23/2022     10/23/2022    CO2 31 10/23/2022    ANIONGAP 3 (L) 03/23/2015    AGAP 5 10/23/2022    BUN 14 10/23/2022    CREATININE 1 21 10/23/2022    GLUF 91 10/23/2022    CALCIUM 9 1 10/23/2022    AST 16 10/23/2022    ALT 14 10/23/2022    ALKPHOS 59 10/23/2022    PROT 7 6 02/02/2016    TP 7 3 10/23/2022    BILITOT 0 8 02/02/2016    TBILI 0 54 10/23/2022    EGFR 72 10/23/2022         Pharmacist Tracking Tool  Reason For Outreach: Embedded Pharmacist  Demographics:  Intervention Method: Phone  Type of Intervention: Follow-Up  Topics Addressed: Obesity  Pharmacologic Interventions: Medication Initiation  Non-Pharmacologic Interventions: Care coordination, Cost, Disease state education, Home Monitoring and Medication/Device education  Time:  Direct Patient Care: 15 mins   Care Coordination: 30 mins  Recommendation Recipient: Patient/Caregiver  Outcome: Accepted

## 2023-02-27 NOTE — PATIENT INSTRUCTIONS
Thank you for enrolling in [facility]  Please follow the instructions below to securely access your online medical record  Netgent allows you to send messages to your doctor, view your test results, renew your prescriptions, schedule appointments, and more  [facility] uses Single Sign on (SSO) Technology for you to log in and access our [address]  [email or web address], including Bangbite  No more remembering multiple user names and passwords! We are going to guide you through, step by step, to help you set up your Irven Shingles account which will provide access to your Netgent account  How Do I Sign Up? 1  In your Internet browser, go to [email or web address]       2  Click on the   neoSaej patient account and then click Dont have an                 Account? Create one now      3  Enter your demographic information and chose a user name (email address) and password  Think of one that is secure and easy to remember  Enter a Referral code if you have one (this is not your MiniLuxehart code ) Accept the Terms and Conditions and the Privacy Policy  4  Select your security questions that you will use to reset your password should you forget it  Click Submit  5  Enter your Netgent Activation Code exactly as it appears below  You will not need to use this code after you have completed the sign-up process  If you do not sign up before the expiration date, you must request a new code  Bangbite Activation Code: [email or web address]  Expires: 5/4/2018  1:57 PM    6  Confirm your email address  An email confirmation was sent to you  Please open that email and click Confirm your Email   You should then be redirected to our Marblar Single sign on page, where you will log on with the user name and password you created! Proceed to the Bangbite Icon to view your personal health information          Additional Information  If you have questions, you can e-mail patient  [email or web address] or call [phone] to talk to our customer support staff  Remember, MyChart is NOT to be used for urgent needs  For medical emergencies, dial 911  Double O-Z Plasty Text: Because of the full-thickness nature of the defect and location adjacent to important structure(s), a bilateral rotation flap (double O to Z) was planned. After prep and anesthesia, arcuate incisions were extended from two opposite side of the wound.  Two flaps were created by undermining in the subcutaneous plane. After hemostasis was obtained, the flaps were advanced and sutured in a layered fashion.

## 2023-03-20 ENCOUNTER — PATIENT MESSAGE (OUTPATIENT)
Dept: FAMILY MEDICINE CLINIC | Facility: CLINIC | Age: 43
End: 2023-03-20

## 2023-03-20 DIAGNOSIS — E66.01 MORBID OBESITY WITH BMI OF 45.0-49.9, ADULT (HCC): Primary | ICD-10-CM

## 2023-03-20 NOTE — TELEPHONE ENCOUNTER
Jovani Samson DO 3/20/2023 4:09 PM EDT    Hi Shilpa Jayesh- have you sent this in for him?  ----- Message -----  From: Robert Reyna  Sent: 3/20/2023 3:48 PM EDT  To: Jovani Samson DO  Subject: FW: Prescription to Advance to [facility]   50     Please advise  ----- Message -----  From: Bran Michelle  Sent: 3/20/2023 3:08 PM EDT  To: Marcey Oppenheim [facility]  Subject: Prescription to Advance to [facility]   50     Hello     I completed the 4 doses for [facility]   25 however there’s no prescription in [facility] to advance to [facility]   50       Dudley

## 2023-03-21 ENCOUNTER — CLINICAL SUPPORT (OUTPATIENT)
Dept: FAMILY MEDICINE CLINIC | Facility: CLINIC | Age: 43
End: 2023-03-21

## 2023-03-21 DIAGNOSIS — E66.01 MORBID OBESITY WITH BMI OF 45.0-49.9, ADULT (HCC): Primary | ICD-10-CM

## 2023-03-21 NOTE — PROGRESS NOTES
[address] [facility]  [place]       ASSESSMENT/PLAN                                                                                     Obesity:    Patient has  been working on lifestyle modifications for >6 months; weight has been increasing  Contraindications to the following medication options: none    Baseline Weight: 345lbs  Current weight loss: -7 pounds (-2%)  5% weight loss goal (to be met at week 12): 17lbs (328)  Initiation or Continuation of Therapy: Continuation  Comorbid CV Risk Factors: HLD, HTN, Prediabetes, AMBROSE    Medications: If PCP is in agreeance, continue titration Wegovy  Will pend rx for PCP signature/concurrence  Lifestyle:   BMI Counseling: There is no height or weight on file to calculate BMI  The BMI is above normal  Pharmacotherapy was ordered for patient to aid in weight loss  Referral: none  Labs: none      Wegovy, Saxenda: savings card (pay as little as $25/month or reduce cost by $200)    Discontinuation of therapy: If the patient has not lost at least 5% of baseline body weight after 12 weeks at the maintenance dosage, discontinue therapy; clinically meaningful weight loss is unlikely with continued treatment  2  Medication Reconciliation: Medication list reviewed with pt at today's visit  Discrepancies as discussed below     - Medication list updated to reflect medications pt is currently taking    Follow-Up: 2-3 weeks for side effects and efficacy, will need weight check scheduled at 12 week steve      SUBJECTIVE                                                                                                              Medication Adherence: Medication list reviewed with patient, reports the following discrepancies/problems:none  amLODIPine  nebivolol  omeprazole  Semaglutide-Weight Management  valsartan-hydrochlorothiazide:         Kidney Stone History: none      OBJECTIVE Previous Weight Loss medications trialed:  none    Wt Readings from Last 5 Encounters:   11/28/22 (!) 154 kg (340 lb)   11/11/22 (!) 154 kg (340 lb 3 2 oz)   10/26/22 (!) 152 kg (334 lb 3 2 oz)   10/14/22 (!) 151 kg (333 lb)   09/27/22 (!) 152 kg (336 lb)       BP Readings from Last 3 Encounters:   02/09/23 149/90   11/28/22 138/69   11/11/22 122/78       Pulse Readings from Last 3 Encounters:   02/09/23 62   11/28/22 68   11/11/22 71       Pertinent Lab Data:  Lab Results   Component Value Date     02/02/2016    SODIUM 140 10/23/2022    K 3 9 10/23/2022     10/23/2022    CO2 31 10/23/2022    ANIONGAP 3 (L) 03/23/2015    AGAP 5 10/23/2022    BUN 14 10/23/2022    CREATININE 1 21 10/23/2022    GLUF 91 10/23/2022    CALCIUM 9 1 10/23/2022    AST 16 10/23/2022    ALT 14 10/23/2022    ALKPHOS 59 10/23/2022    PROT 7 6 02/02/2016    TP 7 3 10/23/2022    BILITOT 0 8 02/02/2016    TBILI 0 54 10/23/2022    EGFR 72 10/23/2022         Pharmacist Tracking Tool  Reason For Outreach: Embedded Pharmacist  Demographics:  Intervention Method: Phone  Type of Intervention: Follow-Up  Topics Addressed: Obesity  Pharmacologic Interventions: Dose or Frequency Adjusted, Prevent or Manage EVARISTO and Med Rec  Non-Pharmacologic Interventions: Care coordination, Cost, Disease state education, Home Monitoring and Medication/Device education  Time:  Direct Patient Care: 15 mins   Care Coordination: 30 mins  Recommendation Recipient: Patient/Caregiver  Outcome: Accepted

## 2023-04-05 ENCOUNTER — OFFICE VISIT (OUTPATIENT)
Dept: SLEEP CENTER | Facility: CLINIC | Age: 43
End: 2023-04-05

## 2023-04-05 VITALS
DIASTOLIC BLOOD PRESSURE: 86 MMHG | HEIGHT: 71 IN | BODY MASS INDEX: 44.1 KG/M2 | OXYGEN SATURATION: 97 % | HEART RATE: 79 BPM | WEIGHT: 315 LBS | SYSTOLIC BLOOD PRESSURE: 138 MMHG

## 2023-04-05 DIAGNOSIS — E66.01 MORBID OBESITY WITH BMI OF 40.0-44.9, ADULT (HCC): ICD-10-CM

## 2023-04-05 DIAGNOSIS — G47.33 OBSTRUCTIVE SLEEP APNEA: Primary | ICD-10-CM

## 2023-04-05 DIAGNOSIS — F51.12 INSUFFICIENT SLEEP SYNDROME: ICD-10-CM

## 2023-04-05 DIAGNOSIS — I10 ESSENTIAL HYPERTENSION: ICD-10-CM

## 2023-04-05 DIAGNOSIS — K21.9 GERD WITHOUT ESOPHAGITIS: ICD-10-CM

## 2023-04-05 NOTE — PATIENT INSTRUCTIONS
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
## 2023-04-06 ENCOUNTER — TELEPHONE (OUTPATIENT)
Dept: SLEEP CENTER | Facility: CLINIC | Age: 43
End: 2023-04-06

## 2023-04-07 LAB

## 2023-04-29 ENCOUNTER — APPOINTMENT (OUTPATIENT)
Dept: LAB | Facility: CLINIC | Age: 43
End: 2023-04-29

## 2023-04-29 DIAGNOSIS — I10 ESSENTIAL HYPERTENSION: ICD-10-CM

## 2023-04-29 DIAGNOSIS — R73.03 PREDIABETES: ICD-10-CM

## 2023-04-29 DIAGNOSIS — I10 PRIMARY HYPERTENSION: ICD-10-CM

## 2023-04-29 LAB
ALBUMIN SERPL BCP-MCNC: 4.3 G/DL (ref 3.5–5)
ALP SERPL-CCNC: 61 U/L (ref 34–104)
ALT SERPL W P-5'-P-CCNC: 28 U/L (ref 7–52)
ANION GAP SERPL CALCULATED.3IONS-SCNC: 6 MMOL/L (ref 4–13)
AST SERPL W P-5'-P-CCNC: 20 U/L (ref 13–39)
BILIRUB SERPL-MCNC: 0.72 MG/DL (ref 0.2–1)
BUN SERPL-MCNC: 13 MG/DL (ref 5–25)
CALCIUM SERPL-MCNC: 9.7 MG/DL (ref 8.4–10.2)
CHLORIDE SERPL-SCNC: 101 MMOL/L (ref 96–108)
CHOLEST SERPL-MCNC: 162 MG/DL
CO2 SERPL-SCNC: 30 MMOL/L (ref 21–32)
CREAT SERPL-MCNC: 1.29 MG/DL (ref 0.6–1.3)
GFR SERPL CREATININE-BSD FRML MDRD: 67 ML/MIN/1.73SQ M
GLUCOSE P FAST SERPL-MCNC: 87 MG/DL (ref 65–99)
HDLC SERPL-MCNC: 35 MG/DL
LDLC SERPL CALC-MCNC: 97 MG/DL (ref 0–100)
POTASSIUM SERPL-SCNC: 4 MMOL/L (ref 3.5–5.3)
PROT SERPL-MCNC: 8 G/DL (ref 6.4–8.4)
SODIUM SERPL-SCNC: 137 MMOL/L (ref 135–147)
TRIGL SERPL-MCNC: 150 MG/DL
TSH SERPL DL<=0.05 MIU/L-ACNC: 1.2 UIU/ML (ref 0.45–4.5)

## 2023-04-30 RX ORDER — AMLODIPINE BESYLATE 5 MG/1
5 TABLET ORAL DAILY
Qty: 90 TABLET | Refills: 0 | Status: SHIPPED | OUTPATIENT
Start: 2023-04-30

## 2023-05-02 LAB
EST. AVERAGE GLUCOSE BLD GHB EST-MCNC: 111 MG/DL
HBA1C MFR BLD: 5.5 %

## 2023-05-03 ENCOUNTER — OFFICE VISIT (OUTPATIENT)
Dept: FAMILY MEDICINE CLINIC | Facility: CLINIC | Age: 43
End: 2023-05-03

## 2023-05-03 VITALS
HEIGHT: 71 IN | OXYGEN SATURATION: 100 % | HEART RATE: 80 BPM | SYSTOLIC BLOOD PRESSURE: 140 MMHG | WEIGHT: 315 LBS | TEMPERATURE: 96.7 F | RESPIRATION RATE: 18 BRPM | DIASTOLIC BLOOD PRESSURE: 94 MMHG | BODY MASS INDEX: 44.1 KG/M2

## 2023-05-03 DIAGNOSIS — R73.03 PREDIABETES: ICD-10-CM

## 2023-05-03 DIAGNOSIS — E66.01 MORBID OBESITY WITH BMI OF 45.0-49.9, ADULT (HCC): ICD-10-CM

## 2023-05-03 DIAGNOSIS — K44.9 HIATAL HERNIA: ICD-10-CM

## 2023-05-03 DIAGNOSIS — Z23 NEED FOR VACCINATION: ICD-10-CM

## 2023-05-03 DIAGNOSIS — I10 PRIMARY HYPERTENSION: ICD-10-CM

## 2023-05-03 DIAGNOSIS — Z00.00 ANNUAL PHYSICAL EXAM: Primary | ICD-10-CM

## 2023-05-03 PROBLEM — Z11.4 SCREENING FOR HIV (HUMAN IMMUNODEFICIENCY VIRUS): Status: RESOLVED | Noted: 2022-04-26 | Resolved: 2023-05-03
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
## 2023-05-08 ENCOUNTER — CLINICAL SUPPORT (OUTPATIENT)
Dept: FAMILY MEDICINE CLINIC | Facility: CLINIC | Age: 43
End: 2023-05-08

## 2023-05-08 DIAGNOSIS — E66.01 MORBID OBESITY WITH BMI OF 45.0-49.9, ADULT (HCC): Primary | ICD-10-CM

## 2023-05-08 NOTE — PROGRESS NOTES
[address] [facility]  [place]       ASSESSMENT/PLAN                                                                                     Obesity:    Prior to starting Wegovy, Patient has  been working on lifestyle modifications for >6 months; weight has been increasing  Contraindications to the following medication options: none    Baseline Weight: 345lbs  Current weight loss: -20 pounds (-5 7%)  5% weight loss goal (to be met at week 12): 17lbs (328) - met  Initiation or Continuation of Therapy: Continuation  Comorbid CV Risk Factors: HLD, HTN, Prediabetes, AMBROSE    Medications: If PCP is in agreeance, continue titration Wegovy to 1 7mg  Will pend rx for PCP signature/concurrence  Lifestyle:   BMI Counseling: There is no height or weight on file to calculate BMI  The BMI is above normal  Pharmacotherapy was ordered for patient to aid in weight loss  Referral: none  Labs: none      Wegovy, Saxenda: savings card (pay as little as $25/month or reduce cost by $200)    Discontinuation of therapy: If the patient has not lost at least 5% of baseline body weight after 12 weeks at the maintenance dosage, discontinue therapy; clinically meaningful weight loss is unlikely with continued treatment  2  Medication Reconciliation: Medication list reviewed with pt at today's visit  Discrepancies as discussed below  - Medication list updated to reflect medications pt is currently taking    Follow-Up: 2-3 weeks for side effects and efficacy      SUBJECTIVE                                                                                                              Medication Adherence: Medication list reviewed with patient, reports the following discrepancies/problems:none  amLODIPine  nebivolol  omeprazole  Semaglutide-Weight Management  valsartan-hydrochlorothiazide:     Reports making better decisions about what he is eating  Probiotics are helping with GI side effects       Kidney Stone History: none      OBJECTIVE                                                                                                      Previous Weight Loss medications trialed:  none    Wt Readings from Last 5 Encounters:   05/03/23 (!) 148 kg (326 lb 12 8 oz)   04/20/23 (!) 151 kg (332 lb 8 oz)   04/05/23 (!) 153 kg (337 lb)   11/28/22 (!) 154 kg (340 lb)   11/11/22 (!) 154 kg (340 lb 3 2 oz)       BP Readings from Last 3 Encounters:   05/03/23 140/94   04/20/23 140/92   04/05/23 138/86       Pulse Readings from Last 3 Encounters:   05/03/23 80   04/20/23 85   04/05/23 79       Pertinent Lab Data:  Lab Results   Component Value Date     02/02/2016    SODIUM 137 04/29/2023    K 4 0 04/29/2023     04/29/2023    CO2 30 04/29/2023    ANIONGAP 3 (L) 03/23/2015    AGAP 6 04/29/2023    BUN 13 04/29/2023    CREATININE 1 29 04/29/2023    GLUF 87 04/29/2023    CALCIUM 9 7 04/29/2023    AST 20 04/29/2023    ALT 28 04/29/2023    ALKPHOS 61 04/29/2023    PROT 7 6 02/02/2016    TP 8 0 04/29/2023    BILITOT 0 8 02/02/2016    TBILI 0 72 04/29/2023    EGFR 67 04/29/2023         Pharmacist Tracking Tool  Reason For Outreach: Embedded Pharmacist  Demographics:  Intervention Method: Phone  Type of Intervention: Follow-Up  Topics Addressed: Obesity  Pharmacologic Interventions: Dose or Frequency Adjusted, Prevent or Manage EVARISTO and Med Rec  Non-Pharmacologic Interventions: Care coordination, Cost, Disease state education, Home Monitoring and Medication/Device education  Time:  Direct Patient Care: 15 mins   Care Coordination: 30 mins  Recommendation Recipient: Patient/Caregiver  Outcome: Accepted

## 2023-05-24 ENCOUNTER — CLINICAL SUPPORT (OUTPATIENT)
Dept: FAMILY MEDICINE CLINIC | Facility: CLINIC | Age: 43
End: 2023-05-24

## 2023-05-24 DIAGNOSIS — E66.01 MORBID OBESITY WITH BMI OF 45.0-49.9, ADULT (HCC): Primary | ICD-10-CM

## 2023-05-24 NOTE — PROGRESS NOTES
1 [facility]  [place]       ASSESSMENT/PLAN                                                                                     Obesity:    Prior to starting Wegovy, Patient has  been working on lifestyle modifications for >6 months; weight has been increasing  Contraindications to the following medication options: none    Baseline Weight: 345lbs  Current weight loss: -XX pounds (-XX%)  5% weight loss goal (to be met at week 12): 17lbs (328) - met  Initiation or Continuation of Therapy: Continuation  Comorbid CV Risk Factors: HLD, HTN, Prediabetes, AMBROSE    Medications: Continue titration to 2 4mg once weekly after completion of four doses of 1 7mg  Lifestyle:   BMI Counseling: There is no height or weight on file to calculate BMI  The BMI is above normal  Pharmacotherapy was ordered for patient to aid in weight loss  Referral: none  Labs: none      Wegovy, Saxenda: savings card (pay as little as $25/month or reduce cost by $200)    Discontinuation of therapy: If the patient has not lost at least 5% of baseline body weight after 12 weeks at the maintenance dosage, discontinue therapy; clinically meaningful weight loss is unlikely with continued treatment  2  Medication Reconciliation: Medication list reviewed with pt at today's visit  Discrepancies as discussed below     - Medication list updated to reflect medications pt is currently taking    Follow-Up: 2-3 weeks for side effects and efficacy      SUBJECTIVE                                                                                                              pcp November 2023, lm for patient, awaiting return call to report on efficacy, side effects, and refill status      Medication Adherence: Medication list reviewed with patient, reports the following discrepancies/problems:none  amLODIPine  nebivolol  omeprazole  Semaglutide-Weight Management  valsartan-hydrochlorothiazide:     Reports making better decisions about what he is eating  Probiotics are helping with GI side effects       Kidney Stone History: none      OBJECTIVE                                                                                                      Previous Weight Loss medications trialed:  none    Wt Readings from Last 5 Encounters:   05/03/23 (!) 148 kg (326 lb 12 8 oz)   04/20/23 (!) 151 kg (332 lb 8 oz)   04/05/23 (!) 153 kg (337 lb)   11/28/22 (!) 154 kg (340 lb)   11/11/22 (!) 154 kg (340 lb 3 2 oz)       BP Readings from Last 3 Encounters:   05/03/23 140/94   04/20/23 140/92   04/05/23 138/86       Pulse Readings from Last 3 Encounters:   05/03/23 80   04/20/23 85   04/05/23 79       Pertinent Lab Data:  Lab Results   Component Value Date    AGAP 6 04/29/2023    ALKPHOS 61 04/29/2023    ALT 28 04/29/2023    ANIONGAP 3 (L) 03/23/2015    AST 20 04/29/2023    BILITOT 0 8 02/02/2016    BUN 13 04/29/2023    CALCIUM 9 7 04/29/2023     04/29/2023    CO2 30 04/29/2023    CREATININE 1 29 04/29/2023    EGFR 67 04/29/2023    GLUF 87 04/29/2023    K 4 0 04/29/2023     02/02/2016    PROT 7 6 02/02/2016    SODIUM 137 04/29/2023    TBILI 0 72 04/29/2023    TP 8 0 04/29/2023         Pharmacist Tracking Tool  Reason For Outreach: Embedded Pharmacist  Demographics:  Intervention Method: Phone  Type of Intervention: Follow-Up  Topics Addressed: Obesity  Pharmacologic Interventions: Dose or Frequency Adjusted, Prevent or Manage EVARISTO and Med Rec  Non-Pharmacologic Interventions: Care coordination, Cost, Disease state education, Home Monitoring and Medication/Device education  Time:  Direct Patient Care: 15 mins   Care Coordination: 30 mins  Recommendation Recipient: Patient/Caregiver  Outcome: Accepted

## 2023-06-05 ENCOUNTER — TELEPHONE (OUTPATIENT)
Dept: FAMILY MEDICINE CLINIC | Facility: CLINIC | Age: 43
End: 2023-06-05

## 2023-06-05 DIAGNOSIS — E66.01 MORBID OBESITY WITH BMI OF 45.0-49.9, ADULT (HCC): Primary | ICD-10-CM

## 2023-06-05 NOTE — TELEPHONE ENCOUNTER
Patient returning call  He is doing well on 1 7mg  Last shot was yesterday  Will need 2 4mg  No bad side effects  Losing weight (not reported)  Less alcohol, moving more, eating healthier  Was on vacation last several weeks were harder than typical days  Will send 2 4 and check in approx  1 month         Pharmacist Tracking Tool  Reason For Outreach: Embedded Pharmacist  Demographics:  Intervention Method: Phone  Type of Intervention: Follow-Up  Topics Addressed: Obesity  Pharmacologic Interventions: Dose or Frequency Adjusted and Prevent or Manage EVARISTO  Non-Pharmacologic Interventions: Adherence addressed, Care coordination, Chart update, Home Monitoring and Medication Monitoring  Time:  Direct Patient Care: 5 mins  Care Coordination: 10 mins  Recommendation Recipient: Patient/Caregiver  Outcome: Accepted

## 2023-06-07 ENCOUNTER — TELEPHONE (OUTPATIENT)
Dept: SLEEP CENTER | Facility: CLINIC | Age: 43
End: 2023-06-07

## 2023-06-07 DIAGNOSIS — G47.33 OBSTRUCTIVE SLEEP APNEA: Primary | ICD-10-CM

## 2023-06-07 NOTE — TELEPHONE ENCOUNTER
Returned the patient's call  Patient states that [place] machine was [place]     Patient requires RX for new machine   Wears nasal mask    Last seen by Dr Calderon Brothers 4/6/2023

## 2023-06-08 LAB

## 2023-06-08 NOTE — TELEPHONE ENCOUNTER
Spoke to the patient scheduled DME set up and [facility]

## 2023-06-12 LAB
DME PARACHUTE DELIVERY DATE EXPECTED: NORMAL
DME PARACHUTE DELIVERY DATE REQUESTED: NORMAL
DME PARACHUTE DELIVERY NOTE: NORMAL
DME PARACHUTE ITEM DESCRIPTION: NORMAL
DME PARACHUTE ORDER STATUS: NORMAL
DME PARACHUTE SUPPLIER NAME: NORMAL
DME PARACHUTE SUPPLIER PHONE: NORMAL

## 2023-06-14 ENCOUNTER — CLINICAL SUPPORT (OUTPATIENT)
Dept: FAMILY MEDICINE CLINIC | Facility: CLINIC | Age: 43
End: 2023-06-14

## 2023-06-14 DIAGNOSIS — E66.01 MORBID OBESITY WITH BMI OF 45.0-49.9, ADULT (HCC): Primary | ICD-10-CM

## 2023-06-14 NOTE — PROGRESS NOTES
1 Joshua Saenz       ASSESSMENT/PLAN                                                                                     Obesity:    Prior to starting Wegovy, Patient has  been working on lifestyle modifications for >6 months; weight has been increasing  Contraindications to the following medication options: none    Baseline Weight: 345lbs  Current weight loss: -XX pounds (-XX%)  5% weight loss goal (to be met at week 12): 17lbs (328) - met  Initiation or Continuation of Therapy: Continuation  Comorbid CV Risk Factors: HLD, HTN, Prediabetes, AMBROSE    Medications: Continue 2 4mg once weekly   Lifestyle:   BMI Counseling: There is no height or weight on file to calculate BMI  The BMI is above normal  Pharmacotherapy was ordered for patient to aid in weight loss  Referral: none  Labs: none      Wegovy, Saxenda: savings card (pay as little as $25/month or reduce cost by $200)    Discontinuation of therapy: If the patient has not lost at least 5% of baseline body weight after 12 weeks at the maintenance dosage, discontinue therapy; clinically meaningful weight loss is unlikely with continued treatment  2  Medication Reconciliation: Medication list reviewed with pt at today's visit  Discrepancies as discussed below  - Medication list updated to reflect medications pt is currently taking    Follow-Up: 2-3 weeks for side effects and efficacy      SUBJECTIVE                                                                                                              pcp November 2023, doing well, reports more side effects the fourth week of a dose versus week 1 or 2      Medication Adherence: Medication list reviewed with patient, reports the following discrepancies/problems:none  amLODIPine  nebivolol  omeprazole  Semaglutide-Weight Management  valsartan-hydrochlorothiazide:     Reports making better decisions about what he is eating   Probiotics are helping with GI side effects       Kidney Stone History: none      OBJECTIVE                                                                                                      Previous Weight Loss medications trialed:  none    Wt Readings from Last 5 Encounters:   05/03/23 (!) 148 kg (326 lb 12 8 oz)   04/20/23 (!) 151 kg (332 lb 8 oz)   04/05/23 (!) 153 kg (337 lb)   11/28/22 (!) 154 kg (340 lb)   11/11/22 (!) 154 kg (340 lb 3 2 oz)       BP Readings from Last 3 Encounters:   05/03/23 140/94   04/20/23 140/92   04/05/23 138/86       Pulse Readings from Last 3 Encounters:   05/03/23 80   04/20/23 85   04/05/23 79       Pertinent Lab Data:  Lab Results   Component Value Date    AGAP 6 04/29/2023    ALKPHOS 61 04/29/2023    ALT 28 04/29/2023    ANIONGAP 3 (L) 03/23/2015    AST 20 04/29/2023    BILITOT 0 8 02/02/2016    BUN 13 04/29/2023    CALCIUM 9 7 04/29/2023     04/29/2023    CO2 30 04/29/2023    CREATININE 1 29 04/29/2023    EGFR 67 04/29/2023    GLUF 87 04/29/2023    K 4 0 04/29/2023     02/02/2016    PROT 7 6 02/02/2016    SODIUM 137 04/29/2023    TBILI 0 72 04/29/2023    TP 8 0 04/29/2023         Pharmacist Tracking Tool  Reason For Outreach: Embedded Pharmacist  Demographics:  Intervention Method: Phone  Type of Intervention: Follow-Up  Topics Addressed: Obesity  Pharmacologic Interventions: Dose or Frequency Adjusted, Prevent or Manage EVARISTO and Med Rec  Non-Pharmacologic Interventions: Care coordination, Cost, Disease state education, Home Monitoring and Medication/Device education  Time:  Direct Patient Care: 15 mins   Care Coordination: 30 mins  Recommendation Recipient: Patient/Caregiver  Outcome: Accepted

## 2023-06-22 LAB

## 2023-06-30 DIAGNOSIS — E66.01 MORBID OBESITY WITH BMI OF 45.0-49.9, ADULT (HCC): ICD-10-CM

## 2023-07-12 ENCOUNTER — PATIENT MESSAGE (OUTPATIENT)
Dept: FAMILY MEDICINE CLINIC | Facility: CLINIC | Age: 43
End: 2023-07-12

## 2023-07-12 NOTE — PATIENT COMMUNICATION
Documentation for [facility]SISSY CHIU check in via [facility].        Pharmacist Tracking Tool  Reason For Outreach: Embedded Pharmacist  Demographics:  Intervention Method: Mychart Message  Type of Intervention: Follow-Up  Topics Addressed: Obesity  Pharmacologic Interventions: Prevent or Manage EVARISTO and Med Rec  Non-Pharmacologic Interventions: Adherence addressed, Care coordination, Cost, Home Monitoring and Medication Monitoring  Time:  Direct Patient Care: 5 mins  Care Coordination: 15 mins  Recommendation Recipient: Patient/Caregiver  Outcome: Accepted

## 2023-07-30 DIAGNOSIS — E66.01 MORBID OBESITY WITH BMI OF 45.0-49.9, ADULT (HCC): ICD-10-CM

## 2023-07-30 RX ORDER — SEMAGLUTIDE 2.4 MG/.75ML
INJECTION, SOLUTION SUBCUTANEOUS
Qty: 4 ML | Refills: 0 | Status: SHIPPED | OUTPATIENT
Start: 2023-07-30

## 2023-08-01 DIAGNOSIS — I10 PRIMARY HYPERTENSION: ICD-10-CM

## 2023-08-01 RX ORDER — NEBIVOLOL 5 MG/1
5 TABLET ORAL DAILY
Qty: 90 TABLET | Refills: 1 | Status: SHIPPED | OUTPATIENT
Start: 2023-08-01

## 2023-08-02 DIAGNOSIS — I10 ESSENTIAL HYPERTENSION: ICD-10-CM

## 2023-08-02 RX ORDER — AMLODIPINE BESYLATE 5 MG/1
5 TABLET ORAL DAILY
Qty: 90 TABLET | Refills: 0 | Status: SHIPPED | OUTPATIENT
Start: 2023-08-02

## 2023-08-24 DIAGNOSIS — E66.01 MORBID OBESITY WITH BMI OF 45.0-49.9, ADULT (HCC): ICD-10-CM

## 2023-08-24 RX ORDER — SEMAGLUTIDE 2.4 MG/.75ML
INJECTION, SOLUTION SUBCUTANEOUS
Qty: 4 ML | Refills: 0 | Status: SHIPPED | OUTPATIENT
Start: 2023-08-24

## 2023-09-21 DIAGNOSIS — E66.01 MORBID OBESITY WITH BMI OF 45.0-49.9, ADULT (HCC): ICD-10-CM

## 2023-09-21 RX ORDER — SEMAGLUTIDE 2.4 MG/.75ML
INJECTION, SOLUTION SUBCUTANEOUS
Qty: 4 ML | Refills: 0 | Status: SHIPPED | OUTPATIENT
Start: 2023-09-21

## 2023-09-22 ENCOUNTER — CLINICAL SUPPORT (OUTPATIENT)
Dept: FAMILY MEDICINE CLINIC | Facility: CLINIC | Age: 43
End: 2023-09-22

## 2023-09-22 DIAGNOSIS — E66.01 MORBID OBESITY WITH BMI OF 45.0-49.9, ADULT (HCC): Primary | ICD-10-CM

## 2023-09-22 NOTE — PROGRESS NOTES
[address], [place]. D.      ASSESSMENT/PLAN                                                                                     Obesity:    Prior to starting Wegovy, Patient has  been working on lifestyle modifications for >6 months; weight has been increasing. Contraindications to the following medication options: none    Baseline Weight: 345lbs  Current weight loss: -50pounds (-15%)  5% weight loss goal (to be met at week 12): 17lbs (328) - met  Initiation or Continuation of Therapy: Continuation  Comorbid CV Risk Factors: HLD, HTN, Prediabetes, AMBROSE    Medications: Continue 2.4mg once weekly , patient to consider spacing out doses to every 8-9 days versus every 7, will report back  Lifestyle:   BMI Counseling: There is no height or weight on file to calculate BMI. The BMI is above normal. Pharmacotherapy was ordered for patient to aid in weight loss. Referral: none  Labs: none      Wegovy, Saxenda: savings card (pay as little as $25/month or reduce cost by $200)    Discontinuation of therapy: If the patient has not lost at least 5% of baseline body weight after 12 weeks at the maintenance dosage, discontinue therapy; clinically meaningful weight loss is unlikely with continued treatment. 2. Medication Reconciliation: Medication list reviewed with pt at today's visit. Discrepancies as discussed below. - Medication list updated to reflect medications pt is currently taking    Follow-Up: 2-3 weeks for side effects and efficacy      SUBJECTIVE                                                                                                              Doing well, has been on 2.4mg since June, working out for the last month or so, doing orange theory - scheduled, great deal of accountability, more hungry/needs more calories due to workout but difficult to eat amount he has burned due to [facility] satiety effect. No negative GI effects.        Medication Adherence: Medication list reviewed with patient, reports the following discrepancies/problems:none  amLODIPine  nebivolol  omeprazole  valsartan-hydrochlorothiazide  Wegovy Soaj:     Reports making better decisions about what he is eating. Probiotics are helping with GI side effects.      Kidney Stone History: none      OBJECTIVE                                                                                                      Previous Weight Loss medications trialed:  none    Wt Readings from Last 5 Encounters:   05/03/23 (!) 148 kg (326 lb 12.8 oz)   04/20/23 (!) 151 kg (332 lb 8 oz)   04/05/23 (!) 153 kg (337 lb)   11/28/22 (!) 154 kg (340 lb)   11/11/22 (!) 154 kg (340 lb 3.2 oz)       BP Readings from Last 3 Encounters:   05/03/23 140/94   04/20/23 140/92   04/05/23 138/86       Pulse Readings from Last 3 Encounters:   05/03/23 80   04/20/23 85   04/05/23 79       Pertinent Lab Data:  Lab Results   Component Value Date     02/02/2016    SODIUM 137 04/29/2023    K 4.0 04/29/2023     04/29/2023    CO2 30 04/29/2023    ANIONGAP 3 (L) 03/23/2015    AGAP 6 04/29/2023    BUN 13 04/29/2023    CREATININE 1.29 04/29/2023    GLUF 87 04/29/2023    CALCIUM 9.7 04/29/2023    AST 20 04/29/2023    ALT 28 04/29/2023    ALKPHOS 61 04/29/2023    PROT 7.6 02/02/2016    TP 8.0 04/29/2023    BILITOT 0.8 02/02/2016    TBILI 0.72 04/29/2023    EGFR 67 04/29/2023         Pharmacist Tracking Tool  Reason For Outreach: Embedded Pharmacist  Demographics:  Intervention Method: Phone  Type of Intervention: Follow-Up  Topics Addressed: Obesity  Pharmacologic Interventions: Dose or Frequency Adjusted, Prevent or Manage EVARISTO and Med Rec  Non-Pharmacologic Interventions: Care coordination, Cost, Disease state education, Home Monitoring and Medication/Device education  Time:  Direct Patient Care: 15 mins   Care Coordination: 30 mins  Recommendation Recipient: Patient/Caregiver  Outcome: Accepted

## 2023-09-25 ENCOUNTER — TELEPHONE (OUTPATIENT)
Dept: FAMILY MEDICINE CLINIC | Facility: CLINIC | Age: 43
End: 2023-09-25

## 2023-09-25 NOTE — TELEPHONE ENCOUNTER
Rosa He  : 1980    Pt needs Prior auth for med    Wegovy 2.4MG/0.75ML  INJECT 0.75ML (2.4MG TOTAL) SUBCUTANEOUSLY ONCE A WEEK    MONIQUE: Amado Patel

## 2023-09-25 NOTE — TELEPHONE ENCOUNTER
Prior auth received from [employer] for [facility] continuing therapy. Documented >5% weight loss, appropriate to continue.  KEY  [id number]      Pharmacist Tracking Tool  Reason For Outreach: Embedded Pharmacist  Demographics:  Intervention Method: Phone  Type of Intervention: Follow-Up  Topics Addressed: Obesity  Pharmacologic Interventions: N/A  Non-Pharmacologic Interventions: Care coordination  Time:  Direct Patient Care: 5 mins  Care Coordination: 10 mins  Recommendation Recipient: Patient/Caregiver  Outcome: Accepted

## 2023-09-27 NOTE — TELEPHONE ENCOUNTER
Approved.          Pharmacist Tracking Tool  Reason For Outreach: Embedded Pharmacist  Demographics:  Intervention Method: Chart Review  Type of Intervention: Follow-Up  Topics Addressed: Diabetes  Pharmacologic Interventions: Med Rec  Non-Pharmacologic Interventions: Care coordination  Time:  Direct Patient Care: 0 mins  Care Coordination: 5 mins  Recommendation Recipient: Patient/Caregiver  Outcome: Accepted

## 2023-09-30 ENCOUNTER — ANESTHESIA EVENT (EMERGENCY)
Dept: PERIOP | Facility: HOSPITAL | Age: 43
End: 2023-09-30
Payer: COMMERCIAL

## 2023-09-30 ENCOUNTER — APPOINTMENT (EMERGENCY)
Dept: CT IMAGING | Facility: HOSPITAL | Age: 43
End: 2023-09-30
Payer: COMMERCIAL

## 2023-09-30 ENCOUNTER — APPOINTMENT (EMERGENCY)
Dept: ULTRASOUND IMAGING | Facility: HOSPITAL | Age: 43
End: 2023-09-30
Payer: COMMERCIAL

## 2023-09-30 ENCOUNTER — HOSPITAL ENCOUNTER (EMERGENCY)
Facility: HOSPITAL | Age: 43
Discharge: HOME | End: 2023-09-30
Attending: EMERGENCY MEDICINE
Payer: COMMERCIAL

## 2023-09-30 ENCOUNTER — ANESTHESIA (EMERGENCY)
Dept: PERIOP | Facility: HOSPITAL | Age: 43
End: 2023-09-30
Payer: COMMERCIAL

## 2023-09-30 VITALS
OXYGEN SATURATION: 97 % | SYSTOLIC BLOOD PRESSURE: 140 MMHG | HEART RATE: 77 BPM | RESPIRATION RATE: 18 BRPM | TEMPERATURE: 97.8 F | WEIGHT: 300.49 LBS | DIASTOLIC BLOOD PRESSURE: 77 MMHG | BODY MASS INDEX: 41.56 KG/M2

## 2023-09-30 DIAGNOSIS — K80.00 ACUTE CHOLECYSTITIS DUE TO BILIARY CALCULUS: Primary | ICD-10-CM

## 2023-09-30 DIAGNOSIS — K81.9 CHOLECYSTITIS: ICD-10-CM

## 2023-09-30 LAB
ABO GROUP BLD: NORMAL
ABO GROUP BLD: NORMAL
ALBUMIN SERPL BCP-MCNC: 4.2 G/DL (ref 3.5–5)
ALP SERPL-CCNC: 57 U/L (ref 34–104)
ALT SERPL W P-5'-P-CCNC: 20 U/L (ref 7–52)
ANION GAP SERPL CALCULATED.3IONS-SCNC: 9 MMOL/L
APTT PPP: 28 SECONDS (ref 23–37)
AST SERPL W P-5'-P-CCNC: 13 U/L (ref 13–39)
BASOPHILS # BLD AUTO: 0.03 THOUSANDS/ÂΜL (ref 0–0.1)
BASOPHILS NFR BLD AUTO: 0 % (ref 0–1)
BILIRUB SERPL-MCNC: 0.95 MG/DL (ref 0.2–1)
BILIRUB UR QL STRIP: NEGATIVE
BLD GP AB SCN SERPL QL: NEGATIVE
BUN SERPL-MCNC: 10 MG/DL (ref 5–25)
CALCIUM SERPL-MCNC: 9.4 MG/DL (ref 8.4–10.2)
CHLORIDE SERPL-SCNC: 96 MMOL/L (ref 96–108)
CLARITY UR: CLEAR
CO2 SERPL-SCNC: 29 MMOL/L (ref 21–32)
COLOR UR: COLORLESS
CREAT SERPL-MCNC: 1.09 MG/DL (ref 0.6–1.3)
EOSINOPHIL # BLD AUTO: 0 THOUSAND/ÂΜL (ref 0–0.61)
EOSINOPHIL NFR BLD AUTO: 0 % (ref 0–6)
ERYTHROCYTE [DISTWIDTH] IN BLOOD BY AUTOMATED COUNT: 14.9 % (ref 11.6–15.1)
GFR SERPL CREATININE-BSD FRML MDRD: 82 ML/MIN/1.73SQ M
GLUCOSE SERPL-MCNC: 109 MG/DL (ref 65–140)
GLUCOSE UR STRIP-MCNC: NEGATIVE MG/DL
HCT VFR BLD AUTO: 48.3 % (ref 36.5–49.3)
HGB BLD-MCNC: 15.9 G/DL (ref 12–17)
HGB UR QL STRIP.AUTO: NEGATIVE
IMM GRANULOCYTES # BLD AUTO: 0.07 THOUSAND/UL (ref 0–0.2)
IMM GRANULOCYTES NFR BLD AUTO: 1 % (ref 0–2)
INR PPP: 1.06 (ref 0.84–1.19)
KETONES UR STRIP-MCNC: NEGATIVE MG/DL
LACTATE SERPL-SCNC: 0.7 MMOL/L (ref 0.5–2)
LEUKOCYTE ESTERASE UR QL STRIP: NEGATIVE
LIPASE SERPL-CCNC: 9 U/L (ref 11–82)
LYMPHOCYTES # BLD AUTO: 2.21 THOUSANDS/ÂΜL (ref 0.6–4.47)
LYMPHOCYTES NFR BLD AUTO: 16 % (ref 14–44)
MCH RBC QN AUTO: 27.2 PG (ref 26.8–34.3)
MCHC RBC AUTO-ENTMCNC: 32.9 G/DL (ref 31.4–37.4)
MCV RBC AUTO: 83 FL (ref 82–98)
MONOCYTES # BLD AUTO: 1.33 THOUSAND/ÂΜL (ref 0.17–1.22)
MONOCYTES NFR BLD AUTO: 9 % (ref 4–12)
NEUTROPHILS # BLD AUTO: 10.61 THOUSANDS/ÂΜL (ref 1.85–7.62)
NEUTS SEG NFR BLD AUTO: 74 % (ref 43–75)
NITRITE UR QL STRIP: NEGATIVE
NRBC BLD AUTO-RTO: 0 /100 WBCS
PH UR STRIP.AUTO: 7 [PH]
PLATELET # BLD AUTO: 293 THOUSANDS/UL (ref 149–390)
PMV BLD AUTO: 10.4 FL (ref 8.9–12.7)
POTASSIUM SERPL-SCNC: 3.4 MMOL/L (ref 3.5–5.3)
PROT SERPL-MCNC: 8 G/DL (ref 6.4–8.4)
PROT UR STRIP-MCNC: NEGATIVE MG/DL
PROTHROMBIN TIME: 14.5 SECONDS (ref 11.6–14.5)
RBC # BLD AUTO: 5.85 MILLION/UL (ref 3.88–5.62)
RH BLD: POSITIVE
RH BLD: POSITIVE
SODIUM SERPL-SCNC: 134 MMOL/L (ref 135–147)
SP GR UR STRIP.AUTO: 1.02 (ref 1–1.03)
SPECIMEN EXPIRATION DATE: NORMAL
UROBILINOGEN UR STRIP-ACNC: <2 MG/DL
WBC # BLD AUTO: 14.25 THOUSAND/UL (ref 4.31–10.16)

## 2023-09-30 PROCEDURE — G1004 CDSM NDSC: HCPCS

## 2023-09-30 PROCEDURE — 85025 COMPLETE CBC W/AUTO DIFF WBC: CPT

## 2023-09-30 PROCEDURE — 99285 EMERGENCY DEPT VISIT HI MDM: CPT | Performed by: EMERGENCY MEDICINE

## 2023-09-30 PROCEDURE — 86901 BLOOD TYPING SEROLOGIC RH(D): CPT

## 2023-09-30 PROCEDURE — 85730 THROMBOPLASTIN TIME PARTIAL: CPT

## 2023-09-30 PROCEDURE — 87040 BLOOD CULTURE FOR BACTERIA: CPT

## 2023-09-30 PROCEDURE — 88304 TISSUE EXAM BY PATHOLOGIST: CPT | Performed by: PATHOLOGY

## 2023-09-30 PROCEDURE — 83690 ASSAY OF LIPASE: CPT

## 2023-09-30 PROCEDURE — 74177 CT ABD & PELVIS W/CONTRAST: CPT

## 2023-09-30 PROCEDURE — 96365 THER/PROPH/DIAG IV INF INIT: CPT

## 2023-09-30 PROCEDURE — 96375 TX/PRO/DX INJ NEW DRUG ADDON: CPT

## 2023-09-30 PROCEDURE — 85610 PROTHROMBIN TIME: CPT

## 2023-09-30 PROCEDURE — 83605 ASSAY OF LACTIC ACID: CPT

## 2023-09-30 PROCEDURE — 36415 COLL VENOUS BLD VENIPUNCTURE: CPT

## 2023-09-30 PROCEDURE — 76705 ECHO EXAM OF ABDOMEN: CPT

## 2023-09-30 PROCEDURE — 47562 LAPAROSCOPIC CHOLECYSTECTOMY: CPT | Performed by: SURGERY

## 2023-09-30 PROCEDURE — 96361 HYDRATE IV INFUSION ADD-ON: CPT

## 2023-09-30 PROCEDURE — 99222 1ST HOSP IP/OBS MODERATE 55: CPT | Performed by: SURGERY

## 2023-09-30 PROCEDURE — 86900 BLOOD TYPING SEROLOGIC ABO: CPT

## 2023-09-30 PROCEDURE — 80053 COMPREHEN METABOLIC PANEL: CPT

## 2023-09-30 PROCEDURE — 99284 EMERGENCY DEPT VISIT MOD MDM: CPT

## 2023-09-30 PROCEDURE — 86850 RBC ANTIBODY SCREEN: CPT

## 2023-09-30 PROCEDURE — 81003 URINALYSIS AUTO W/O SCOPE: CPT

## 2023-09-30 RX ORDER — OXYCODONE HYDROCHLORIDE 5 MG/1
5 TABLET ORAL EVERY 4 HOURS PRN
Qty: 10 TABLET | Refills: 0 | Status: SHIPPED | OUTPATIENT
Start: 2023-09-30 | End: 2023-10-10

## 2023-09-30 RX ORDER — ROCURONIUM BROMIDE 10 MG/ML
INJECTION, SOLUTION INTRAVENOUS AS NEEDED
Status: DISCONTINUED | OUTPATIENT
Start: 2023-09-30 | End: 2023-09-30

## 2023-09-30 RX ORDER — SODIUM CHLORIDE, SODIUM LACTATE, POTASSIUM CHLORIDE, CALCIUM CHLORIDE 600; 310; 30; 20 MG/100ML; MG/100ML; MG/100ML; MG/100ML
INJECTION, SOLUTION INTRAVENOUS CONTINUOUS PRN
Status: DISCONTINUED | OUTPATIENT
Start: 2023-09-30 | End: 2023-09-30

## 2023-09-30 RX ORDER — KETOROLAC TROMETHAMINE 30 MG/ML
INJECTION, SOLUTION INTRAMUSCULAR; INTRAVENOUS AS NEEDED
Status: DISCONTINUED | OUTPATIENT
Start: 2023-09-30 | End: 2023-09-30

## 2023-09-30 RX ORDER — ONDANSETRON 2 MG/ML
4 INJECTION INTRAMUSCULAR; INTRAVENOUS ONCE
Status: COMPLETED | OUTPATIENT
Start: 2023-09-30 | End: 2023-09-30

## 2023-09-30 RX ORDER — ONDANSETRON 2 MG/ML
INJECTION INTRAMUSCULAR; INTRAVENOUS AS NEEDED
Status: DISCONTINUED | OUTPATIENT
Start: 2023-09-30 | End: 2023-09-30

## 2023-09-30 RX ORDER — BUPIVACAINE HYDROCHLORIDE 2.5 MG/ML
INJECTION, SOLUTION EPIDURAL; INFILTRATION; INTRACAUDAL AS NEEDED
Status: DISCONTINUED | OUTPATIENT
Start: 2023-09-30 | End: 2023-09-30 | Stop reason: HOSPADM

## 2023-09-30 RX ORDER — FENTANYL CITRATE/PF 50 MCG/ML
50 SYRINGE (ML) INJECTION
Status: DISCONTINUED | OUTPATIENT
Start: 2023-09-30 | End: 2023-09-30 | Stop reason: HOSPADM

## 2023-09-30 RX ORDER — SUCCINYLCHOLINE/SOD CL,ISO/PF 100 MG/5ML
SYRINGE (ML) INTRAVENOUS AS NEEDED
Status: DISCONTINUED | OUTPATIENT
Start: 2023-09-30 | End: 2023-09-30

## 2023-09-30 RX ORDER — DEXAMETHASONE SODIUM PHOSPHATE 10 MG/ML
INJECTION, SOLUTION INTRAMUSCULAR; INTRAVENOUS AS NEEDED
Status: DISCONTINUED | OUTPATIENT
Start: 2023-09-30 | End: 2023-09-30

## 2023-09-30 RX ORDER — ONDANSETRON 2 MG/ML
4 INJECTION INTRAMUSCULAR; INTRAVENOUS EVERY 4 HOURS PRN
Status: DISCONTINUED | OUTPATIENT
Start: 2023-09-30 | End: 2023-09-30 | Stop reason: HOSPADM

## 2023-09-30 RX ORDER — CEFAZOLIN SODIUM 1 G/3ML
INJECTION, POWDER, FOR SOLUTION INTRAMUSCULAR; INTRAVENOUS AS NEEDED
Status: DISCONTINUED | OUTPATIENT
Start: 2023-09-30 | End: 2023-09-30

## 2023-09-30 RX ORDER — HYDROMORPHONE HCL/PF 1 MG/ML
0.5 SYRINGE (ML) INJECTION
Status: DISCONTINUED | OUTPATIENT
Start: 2023-09-30 | End: 2023-09-30 | Stop reason: HOSPADM

## 2023-09-30 RX ORDER — SODIUM CHLORIDE, SODIUM LACTATE, POTASSIUM CHLORIDE, CALCIUM CHLORIDE 600; 310; 30; 20 MG/100ML; MG/100ML; MG/100ML; MG/100ML
125 INJECTION, SOLUTION INTRAVENOUS CONTINUOUS
Status: DISCONTINUED | OUTPATIENT
Start: 2023-09-30 | End: 2023-09-30 | Stop reason: HOSPADM

## 2023-09-30 RX ORDER — PROPOFOL 10 MG/ML
INJECTION, EMULSION INTRAVENOUS AS NEEDED
Status: DISCONTINUED | OUTPATIENT
Start: 2023-09-30 | End: 2023-09-30

## 2023-09-30 RX ORDER — FENTANYL CITRATE 50 UG/ML
INJECTION, SOLUTION INTRAMUSCULAR; INTRAVENOUS AS NEEDED
Status: DISCONTINUED | OUTPATIENT
Start: 2023-09-30 | End: 2023-09-30

## 2023-09-30 RX ORDER — LIDOCAINE HYDROCHLORIDE 20 MG/ML
INJECTION, SOLUTION EPIDURAL; INFILTRATION; INTRACAUDAL; PERINEURAL AS NEEDED
Status: DISCONTINUED | OUTPATIENT
Start: 2023-09-30 | End: 2023-09-30

## 2023-09-30 RX ORDER — HYDROMORPHONE HCL/PF 1 MG/ML
0.5 SYRINGE (ML) INJECTION ONCE
Status: COMPLETED | OUTPATIENT
Start: 2023-09-30 | End: 2023-09-30

## 2023-09-30 RX ORDER — PHENYLEPHRINE HCL IN 0.9% NACL 1 MG/10 ML
SYRINGE (ML) INTRAVENOUS AS NEEDED
Status: DISCONTINUED | OUTPATIENT
Start: 2023-09-30 | End: 2023-09-30

## 2023-09-30 RX ORDER — ONDANSETRON 2 MG/ML
4 INJECTION INTRAMUSCULAR; INTRAVENOUS ONCE AS NEEDED
Status: DISCONTINUED | OUTPATIENT
Start: 2023-09-30 | End: 2023-09-30 | Stop reason: HOSPADM

## 2023-09-30 RX ORDER — ACETAMINOPHEN 325 MG/1
650 TABLET ORAL EVERY 6 HOURS PRN
Status: DISCONTINUED | OUTPATIENT
Start: 2023-09-30 | End: 2023-09-30 | Stop reason: HOSPADM

## 2023-09-30 RX ORDER — MIDAZOLAM HYDROCHLORIDE 2 MG/2ML
INJECTION, SOLUTION INTRAMUSCULAR; INTRAVENOUS AS NEEDED
Status: DISCONTINUED | OUTPATIENT
Start: 2023-09-30 | End: 2023-09-30

## 2023-09-30 RX ORDER — SODIUM CHLORIDE 9 MG/ML
INJECTION, SOLUTION INTRAVENOUS AS NEEDED
Status: DISCONTINUED | OUTPATIENT
Start: 2023-09-30 | End: 2023-09-30 | Stop reason: HOSPADM

## 2023-09-30 RX ORDER — METRONIDAZOLE 500 MG/100ML
500 INJECTION, SOLUTION INTRAVENOUS
Status: COMPLETED | OUTPATIENT
Start: 2023-09-30 | End: 2023-09-30

## 2023-09-30 RX ORDER — OXYCODONE HYDROCHLORIDE 5 MG/1
5 TABLET ORAL EVERY 4 HOURS PRN
Status: DISCONTINUED | OUTPATIENT
Start: 2023-09-30 | End: 2023-09-30 | Stop reason: SDUPTHER

## 2023-09-30 RX ORDER — HYDROMORPHONE HCL/PF 1 MG/ML
0.5 SYRINGE (ML) INJECTION ONCE AS NEEDED
Status: DISCONTINUED | OUTPATIENT
Start: 2023-09-30 | End: 2023-09-30 | Stop reason: HOSPADM

## 2023-09-30 RX ORDER — PROMETHAZINE HYDROCHLORIDE 25 MG/ML
25 INJECTION, SOLUTION INTRAMUSCULAR; INTRAVENOUS ONCE AS NEEDED
Status: DISCONTINUED | OUTPATIENT
Start: 2023-09-30 | End: 2023-09-30 | Stop reason: HOSPADM

## 2023-09-30 RX ORDER — OXYCODONE HYDROCHLORIDE 5 MG/1
5 TABLET ORAL EVERY 4 HOURS PRN
Status: DISCONTINUED | OUTPATIENT
Start: 2023-09-30 | End: 2023-09-30 | Stop reason: HOSPADM

## 2023-09-30 RX ORDER — METRONIDAZOLE 500 MG/1
500 TABLET ORAL ONCE
Status: COMPLETED | OUTPATIENT
Start: 2023-09-30 | End: 2023-09-30

## 2023-09-30 RX ORDER — HYDROMORPHONE HCL/PF 1 MG/ML
SYRINGE (ML) INJECTION AS NEEDED
Status: DISCONTINUED | OUTPATIENT
Start: 2023-09-30 | End: 2023-09-30

## 2023-09-30 RX ORDER — OXYCODONE HYDROCHLORIDE 10 MG/1
10 TABLET ORAL EVERY 4 HOURS PRN
Status: DISCONTINUED | OUTPATIENT
Start: 2023-09-30 | End: 2023-09-30 | Stop reason: SDUPTHER

## 2023-09-30 RX ORDER — CEFAZOLIN SODIUM 2 G/50ML
2000 SOLUTION INTRAVENOUS
Status: COMPLETED | OUTPATIENT
Start: 2023-09-30 | End: 2023-09-30

## 2023-09-30 RX ADMIN — Medication 200 MCG: at 13:10

## 2023-09-30 RX ADMIN — Medication 140 MG: at 12:53

## 2023-09-30 RX ADMIN — PROPOFOL 200 MG: 10 INJECTION, EMULSION INTRAVENOUS at 12:53

## 2023-09-30 RX ADMIN — IOHEXOL 100 ML: 350 INJECTION, SOLUTION INTRAVENOUS at 09:46

## 2023-09-30 RX ADMIN — DEXAMETHASONE SODIUM PHOSPHATE 10 MG: 10 INJECTION, SOLUTION INTRAMUSCULAR; INTRAVENOUS at 12:53

## 2023-09-30 RX ADMIN — SODIUM CHLORIDE, SODIUM LACTATE, POTASSIUM CHLORIDE, AND CALCIUM CHLORIDE: .6; .31; .03; .02 INJECTION, SOLUTION INTRAVENOUS at 12:47

## 2023-09-30 RX ADMIN — LIDOCAINE HYDROCHLORIDE 100 MG: 20 INJECTION, SOLUTION EPIDURAL; INFILTRATION; INTRACAUDAL; PERINEURAL at 12:53

## 2023-09-30 RX ADMIN — CEFTRIAXONE SODIUM 1000 MG: 10 INJECTION, POWDER, FOR SOLUTION INTRAVENOUS at 11:30

## 2023-09-30 RX ADMIN — KETOROLAC TROMETHAMINE 30 MG: 30 INJECTION INTRAMUSCULAR; INTRAVENOUS at 13:14

## 2023-09-30 RX ADMIN — HYDROMORPHONE HYDROCHLORIDE 0.5 MG: 1 INJECTION, SOLUTION INTRAMUSCULAR; INTRAVENOUS; SUBCUTANEOUS at 10:30

## 2023-09-30 RX ADMIN — FENTANYL CITRATE 50 MCG: 50 INJECTION INTRAMUSCULAR; INTRAVENOUS at 13:00

## 2023-09-30 RX ADMIN — FENTANYL CITRATE 50 MCG: 50 INJECTION INTRAMUSCULAR; INTRAVENOUS at 12:53

## 2023-09-30 RX ADMIN — HYDROMORPHONE HYDROCHLORIDE 0.5 MG: 1 INJECTION, SOLUTION INTRAMUSCULAR; INTRAVENOUS; SUBCUTANEOUS at 13:04

## 2023-09-30 RX ADMIN — ROCURONIUM BROMIDE 50 MG: 10 INJECTION, SOLUTION INTRAVENOUS at 13:00

## 2023-09-30 RX ADMIN — ONDANSETRON 4 MG: 2 INJECTION INTRAMUSCULAR; INTRAVENOUS at 12:53

## 2023-09-30 RX ADMIN — HYDROMORPHONE HYDROCHLORIDE 0.5 MG: 1 INJECTION, SOLUTION INTRAMUSCULAR; INTRAVENOUS; SUBCUTANEOUS at 13:59

## 2023-09-30 RX ADMIN — ONDANSETRON 4 MG: 2 INJECTION INTRAMUSCULAR; INTRAVENOUS at 08:42

## 2023-09-30 RX ADMIN — SODIUM CHLORIDE 1000 ML: 0.9 INJECTION, SOLUTION INTRAVENOUS at 08:37

## 2023-09-30 RX ADMIN — SODIUM CHLORIDE, SODIUM LACTATE, POTASSIUM CHLORIDE, AND CALCIUM CHLORIDE: .6; .31; .03; .02 INJECTION, SOLUTION INTRAVENOUS at 13:44

## 2023-09-30 RX ADMIN — METRONIDAZOLE 500 MG: 500 TABLET ORAL at 12:03

## 2023-09-30 RX ADMIN — SODIUM CHLORIDE 1000 ML: 0.9 INJECTION, SOLUTION INTRAVENOUS at 11:39

## 2023-09-30 RX ADMIN — METRONIDAZOLE: 5 INJECTION, SOLUTION INTRAVENOUS at 12:56

## 2023-09-30 RX ADMIN — MIDAZOLAM 2 MG: 1 INJECTION INTRAMUSCULAR; INTRAVENOUS at 12:42

## 2023-09-30 RX ADMIN — CEFAZOLIN 1000 MG: 1 INJECTION, POWDER, FOR SOLUTION INTRAMUSCULAR; INTRAVENOUS at 12:53

## 2023-09-30 RX ADMIN — CEFAZOLIN SODIUM 2000 MG: 2 SOLUTION INTRAVENOUS at 12:53

## 2023-09-30 NOTE — H&P
H&P- General Surgery  Checo Limon 39 y.o. male MRN: [id number]  Unit/Bed#: ED-05 Encounter: [id number]      Assessment/Plan     Assessment:  39 M w acute cholecystitis    Plan:  • OR for lap karol  o Consent obtained, risks and benefits explained  • NPO  • IVF  • ABX  • Please tigertext on call [facility] surgery resident with any questions    History of Present Illness     HPI:  Checo Limon is a 39 y.o. male who presents with abdominal pain since Thursday, nausea and decreased p.o. intake. Last able to eat small meal at about 4-5 AM.  States pain has been mostly constant since Thursday with some mild improvement. Has not had relief otherwise from over-the-counter medications. Denies symptoms like this happening before. Past surgical history of appendectomy. Denies cardiopulmonary issues. no blood thinners    Review of Systems   Constitutional: Negative for chills and fever. HENT: Negative for ear pain and sore throat. Eyes: Negative for pain and visual disturbance. Respiratory: Negative for cough and shortness of breath. Cardiovascular: Negative for chest pain and palpitations. Gastrointestinal: Negative for abdominal pain and vomiting. Genitourinary: Negative for dysuria and hematuria. Musculoskeletal: Negative for arthralgias and back pain. Skin: Negative for color change and rash. Neurological: Negative for seizures and syncope. Psychiatric/Behavioral: Negative for agitation and behavioral problems. All other systems reviewed and are negative.         Historical Information   Past Medical History:   Diagnosis Date   • Chronic low back pain     Last Assessed: 2/10/2016    • CPAP (continuous positive airway pressure) dependence    • GERD (gastroesophageal reflux disease)    • Hypertension    • Lump of skin     Last Assessed: 2/10/2016    • Obesity 05/22/2013   • Sleep apnea    • Somatic dysfunction of lumbar region     Last Assessed: 2/18/2016    • Whiplash injury to neck     Last Assessed: 2/18/2016      Past Surgical History:   Procedure Laterality Date   • APPENDECTOMY     • MT VASECTOMY UNI/BI SPX W/POSTOP SEMEN EXAMS Bilateral 10/14/2022    Procedure: VASECTOMY;  Surgeon: Linda Melton MD;  Location: [facility] MAIN OR;  Service: Urology   • REDUCTION OF TORSION OF TESTIS  1994   • SURGERY SCROTAL / TESTICULAR     • UPPER GASTROINTESTINAL ENDOSCOPY     • WISDOM TOOTH EXTRACTION       Social History   Social History     Substance and Sexual Activity   Alcohol Use Yes    Comment: once per month only     Social History     Substance and Sexual Activity   Drug Use Never     Social History     Tobacco Use   Smoking Status Never   Smokeless Tobacco Never   Tobacco Comments    1 cigar weekly/ Per allscripts: occasional tobacco smoker      Family History:   Family History   Problem Relation Age of Onset   • Glaucoma Mother    • Hypertension Mother    • Hypertension Father    • Stroke Maternal Grandmother    • Heart disease Neg Hx    • Diabetes Neg Hx    • Thyroid disease Neg Hx    • Cancer Neg Hx        Meds/Allergies   all medications and allergies reviewed  Allergies   Allergen Reactions   • Ace Inhibitors Cough       Objective   First Vitals:   Blood Pressure: (!) 183/111 (09/30/23 0801)  Pulse: 91 (09/30/23 0801)  Temperature: 98 °F (36.7 °C) (09/30/23 0801)  Temp Source: Oral (09/30/23 0801)  Respirations: 18 (09/30/23 0801)  Weight - Scale: (!) 136 kg (300 lb 7.8 oz) (09/30/23 0801)  SpO2: 99 % (09/30/23 0801)    Current Vitals:   Blood Pressure: 160/95 (09/30/23 1145)  Pulse: 80 (09/30/23 1145)  Temperature: 98 °F (36.7 °C) (09/30/23 0801)  Temp Source: Oral (09/30/23 0801)  Respirations: 18 (09/30/23 1145)  Weight - Scale: (!) 136 kg (300 lb 7.8 oz) (09/30/23 0801)  SpO2: 99 % (09/30/23 1145)      Intake/Output Summary (Last 24 hours) at 9/30/2023 1207  Last data filed at 9/30/2023 1200  Gross per 24 hour   Intake 1050 ml   Output --   Net 1050 ml       Invasive Devices     Peripheral Intravenous Line  Duration           Peripheral IV 09/30/23 Distal;Left;Upper;Ventral (anterior) Arm <1 day                Physical Exam  Vitals reviewed. Constitutional:       General: He is not in acute distress. Appearance: He is not toxic-appearing. HENT:      Head: Normocephalic and atraumatic. Right Ear: External ear normal.      Left Ear: External ear normal.      Nose: Nose normal.      Mouth/Throat:      Mouth: Mucous membranes are moist.      Pharynx: Oropharynx is clear. Eyes:      Extraocular Movements: Extraocular movements intact. Conjunctiva/sclera: Conjunctivae normal.      Pupils: Pupils are equal, round, and reactive to light. Cardiovascular:      Rate and Rhythm: Normal rate and regular rhythm. Pulmonary:      Effort: Pulmonary effort is normal. No respiratory distress. Abdominal:      Comments: Right upper quadrant tenderness, positive Tyson's   Musculoskeletal:         General: No swelling or deformity. Normal range of motion. Cervical back: Neck supple. No rigidity. Skin:     General: Skin is warm and dry. Capillary Refill: Capillary refill takes less than 2 seconds. Findings: No erythema. Neurological:      General: No focal deficit present. Mental Status: He is oriented to person, place, and time. Psychiatric:         Mood and Affect: Mood normal.         Behavior: Behavior normal.         Lab Results: I have personally reviewed pertinent lab results. Imaging: I have personally reviewed pertinent reports. EKG, Pathology, and Other Studies: I have personally reviewed pertinent reports.       Code Status: No Order  Advance Directive and Living Will:      Power of :    POLST:

## 2023-09-30 NOTE — SEPSIS NOTE
Sepsis Note   Franchesca Ball 39 y.o. male MRN: [id number]  Unit/Bed#: ED-05 Encounter: [id number]       Initial Sepsis Screening     Row Name 09/30/23 1204                Is the patient's history suggestive of a new or worsening infection? Yes (Proceed)  -AB        Suspected source of infection acute abdominal infection  -AB        Indicate SIRS criteria Tachycardia > 90 bpm;Leukocytosis (WBC > 13218 IJL) OR Leukopenia (WBC <4000 IJL) OR Bandemia (WBC >10% bands)  -AB        Are two or more of the above signs & symptoms of infection both present and new to the patient? Yes (Proceed)  -AB        Assess for evidence of organ dysfunction: Are any of the below criteria present within 6 hours of suspected infection and SIRS criteria that are NOT considered to be chronic conditions? --              User Key  (r) = Recorded By, (t) = Taken By, (c) = Cosigned By    [address] [place] [address] Name Provider Type    AB Victorina Bolanos MD Resident                    Body mass index is 41.56 kg/m².   Wt Readings from Last 1 Encounters:   09/30/23 (!) 136 kg (300 lb 7.8 oz)        Ideal body weight: 76 kg (167 lb 8.4 oz)  Adjusted ideal body weight: 100 kg (220 lb 11.4 oz) 2 seconds or less

## 2023-09-30 NOTE — ED ATTENDING ATTESTATION
----- Message from Celena Stoll NP sent at 8/2/2023 10:35 AM CDT -----  Platelets improved but remain low. REC: Refer to hematology for evaluation.   9/30/2023  I, Allyssa Emanuel MD, saw and evaluated the patient. I have discussed the patient with the resident/non-physician practitioner and agree with the resident's/non-physician practitioner's findings, Plan of Care, and MDM as documented in the resident's/non-physician practitioner's note, except where noted. All available labs and Radiology studies were reviewed. I was present for key portions of any procedure(s) performed by the resident/non-physician practitioner and I was immediately available to provide assistance. At this point I agree with the current assessment done in the Emergency Department. I have conducted an independent evaluation of this patient a history and physical is as follows:    27-year-old presenting to the ER with right-sided abdominal pain. Associate with nausea. Soft bowel movements. No fevers. Has had chills.     Agree with abdominal labs, CT    Reviewed CT, concern for cholecystitis, will get ultrasound and contact surgery      ED Course         Critical Care Time  Procedures

## 2023-09-30 NOTE — DISCHARGE INSTR - AVS FIRST PAGE
Laparoscopic Cholecystectomy    WHAT YOU SHOULD KNOW:    Cholecystitis is inflammation of your gallbladder. Your gallbladder stores bile, which helps break down the fat that you eat. It also helps remove certain chemicals from your body. You may have a sudden, severe attack (acute cholecystitis) or several mild attacks (chronic cholecystitis). Laparoscopic cholecystectomy is surgery to remove your gallbladder. During this surgery, small incisions are made in your abdomen. A small scope and special tools are inserted through these incisions. Your gallbladder is removed from it normal location and taken out of your abdomen. The incisions are closed with sutures and a small amount of glue is applied over top incisions to help reinforce the incisions to optimize healing. AFTER YOU LEAVE:  Following discharge from the hospital, you may have some questions about your procedure, your activities or your general condition. These instructions may answer some of your questions and help you adjust during the first few days following your operation. You can expect to be sore and tender mostly around the incisions. This pain should last approximally 5 days and gradually improve daily. Incisions: Your doctor may have chosen to use a type of adhesive glue, to close your incision. The glue is used to cover the incision, assist in closure, and prevent contamination so to optimize healing. This adhesive glue will darken and may appear as a purple film over the incision. Do not pick at the glue, it will peel away on its own within one to two weeks. You may apply ice to the incisions to help with pain. Avoid heat as this my make the glue tacky   It is normal to have some bruising, swelling or mild discoloration around the incision. If increasing redness or pain develops, call our office immediately. You may wash the incision gently with soap and water then pat dry. Do not apply any creams or ointments. Dressings: You do not usually need to keep incisions covered with a dressing. A dressing is only required if you had a drain following your surgery and the drain was removed prior to discharge. If a dressing is required the doctor will discuss the dressing with prior to leaving. You may remove the dressing for showering, but reapply when dry. Bathing: You may shower daily with soap and water the day after the procedure. It is OK to GENTLY wash the incision with soap and water then pat dry. Do NOT soak incision in a tub, pool, or hot tub for 2 weeks. Diet: Eat low-fat foods for 4 to 6 weeks while your body learns to digest fat without a gallbladder. Slowly increase the amount of fat that you eat. We recommend you slowly advance your diet. Try to start with softer bland foods and gradually advance as tolerated. Be sure to consume plenty of water. Avoid alcohol. Activity/Restrictions: The evening following the procedure you should rest as much as possible, sitting, lying or reclining. you should be sure someone remains with you until the next morning. Gradually increase your activity daily. Walking 3-4 daily is good and  stairs are ok. Listen to your body. If you start to get tired or sore then rest.   No strenuous activity or exercise for 3-4 weeks. No driving for 5 days or while taking narcotics for pain. Return or work: You may return to work or other activities as soon as your pain is controlled and you feel comfortable. For many people, this is 5 to 7 days after surgery. If your job requires heavy lifting you will need to be on light duty for 2-3 weeks. Follow up appointment: following discharge from the hospital call the office in 1-2 days to set up a post operative appointment to be seen in 2-3 weeks. The phone number and address should be provided in your discharge paper work. Medication: Please take all medications as prescribed.  Call your healthcare provider if you think your medicine is not helping or if you have side effects. If you were given a prescription for Percocet, Norco, or Vicodin for pain be sure to eat prior to taking as these medications as they may cause nausea and vomiting on an empty stomach. DO NOT take Tylenol with these medication for a fever or for further pain control as these medications already contain Tylenol in them. Contact your healthcare provider if:   You have a fever over 101°F (38°C) or chills. You have pain or nausea that is not relieved by medicine. You have redness and swelling around your incisions, or blood or pus is leaking from your incisions. You are constipated or have diarrhea. Your skin or eyes are yellow, or your bowel movements are pale. You have questions or concerns about your surgery, condition, or care. Seek care immediately or call 911 if:   You cannot stop vomiting. Your bowel movements are black or bloody. You have pain in your abdomen and it is swollen or hard. Your arm or leg feels warm, tender, and painful. It may look swollen and red. You feel lightheaded, short of breath, and have chest pain. You cough up blood. [address] [address]  Dept: [phone]    September 30, 2023     Patient: Tia Alfaro   YOB: 1980   Date of Visit: 9/30/2023       To Whom it May Concern:    Finn Tidwell is under my professional care. He was seen in the hospital from 9/30/2023 to 09/30/23. He may return to school on 10/16/2023 without limitations. Please allow him to obtain appropriate follow up 1-2 weeks after his procedure. He may return sooner if he feels well, but must avoid lifting more than 20 lbs for 2 weeks.     If you have any questions or concerns, please don't hesitate to call the office of Dr. Leticia Mortimer ([phone])         Sincerely,          Marzena White MD

## 2023-09-30 NOTE — ANESTHESIA POSTPROCEDURE EVALUATION
Post-Op Assessment Note    CV Status:  Stable    Pain management: adequate     Mental Status:  Awake   Hydration Status:  Stable   Airway Patency:  Patent      Post Op Vitals Reviewed: Yes      Staff: Anesthesiologist         No notable events documented.

## 2023-09-30 NOTE — ED PROVIDER NOTES
History  Chief Complaint   Patient presents with   • Abdominal Pain     RLQ pain started on Thursday. Loss of appetite and nausea. No vomiting. States has felt warm, unsure of fevers. 2 episodes of diarrhea yesterday. Has been off of Wegovy for 2 weeks. States might have eaten undercooked mejia contributing to pain. Had appendix removed in 212.      21-year-old male with history of testicular torsion, appendectomy, GERD presents with abdominal pain. Patient states right lower quadrant abdominal pain/flank pain that he states is queasy in nature. Has not attempted anything for relief. Associated decrease in appetite and loose stools for the past 5 days. Last bowel movement yesterday, small, loose, nonbloody. Previous history of appendectomy and testicular torsion. Previously on [facility] for weight loss, last dose 2 weeks ago. Occasional alcohol use and vaping. Denies recreational drug use. Denies fevers, chills, chest pain, shortness of breath, diaphoresis, nausea, vomiting, dysuria, frequency, urgency, penile discharge, testicular pain, testicular swelling, hernia. Prior to Admission Medications   Prescriptions Last Dose Informant Patient Reported? Taking? Wegovy 2.4 MG/0.75ML   No No   Sig: INJECT 0.75ML (2.4MG TOTAL) SUBCUTANEOUSLY ONCE A WEEK   amLODIPine (NORVASC) 5 mg tablet 9/30/2023  No Yes   Sig: TAKE 1 TABLET (5 MG TOTAL) BY MOUTH DAILY. nebivolol (BYSTOLIC) 5 mg tablet Past Week  No Yes   Sig: TAKE 1 TABLET (5 MG TOTAL) BY MOUTH DAILY.    omeprazole (PriLOSEC) 40 MG capsule 9/30/2023  No Yes   Sig: TAKE 1 CAPSULE BY MOUTH TWICE A DAY   valsartan-hydrochlorothiazide (DIOVAN-HCT) 320-25 MG per tablet 9/30/2023  No Yes   Sig: Take 1 tablet by mouth daily      Facility-Administered Medications: None       Past Medical History:   Diagnosis Date   • Chronic low back pain     Last Assessed: 2/10/2016    • CPAP (continuous positive airway pressure) dependence    • GERD (gastroesophageal reflux disease)    • Hypertension    • Lump of skin     Last Assessed: 2/10/2016    • Obesity 05/22/2013   • Sleep apnea    • Somatic dysfunction of lumbar region     Last Assessed: 2/18/2016    • Whiplash injury to neck     Last Assessed: 2/18/2016        Past Surgical History:   Procedure Laterality Date   • APPENDECTOMY     • NV VASECTOMY UNI/BI SPX W/POSTOP SEMEN EXAMS Bilateral 10/14/2022    Procedure: VASECTOMY;  Surgeon: Greg Horton MD;  Location: [facility] MA[facility] OR;  Service: Urology   • REDUCTION OF TORSION OF TESTIS  1994   • SURGERY SCROTAL / TESTICULAR     • UPPER GASTROINTESTINAL ENDOSCOPY     • WISDOM TOOTH EXTRACTION         Family History   Problem Relation Age of Onset   • Glaucoma Mother    • Hypertension Mother    • Hypertension Father    • Stroke Maternal Grandmother    • Heart disease Neg Hx    • Diabetes Neg Hx    • Thyroid disease Neg Hx    • Cancer Neg Hx      I have reviewed and agree with the history as documented. E-Cigarette/Vaping   • E-Cigarette Use Current Every Day User      E-Cigarette/Vaping Substances   • Nicotine No    • THC No    • CBD No    • Flavoring Yes    • Other No    • Unknown No      Social History     Tobacco Use   • Smoking status: Never   • Smokeless tobacco: Never   • Tobacco comments:     1 cigar weekly/ Per allscripts: occasional tobacco smoker    Vaping Use   • Vaping Use: Every day   • Substances: Flavoring   Substance Use Topics   • Alcohol use: Yes     Comment: once per month only   • Drug use: Never        Review of Systems   All other systems reviewed and are negative.       Physical Exam  ED Triage Vitals   Temperature Pulse Respirations Blood Pressure SpO2   09/30/23 0801 09/30/23 0801 09/30/23 0801 09/30/23 0801 09/30/23 0801   98 °F (36.7 °C) 91 18 (!) 183/111 99 %      Temp Source Heart Rate Source Patient Position - Orthostatic VS BP Location FiO2 (%)   09/30/23 0801 09/30/23 0930 09/30/23 0930 09/30/23 0930 --   Oral Monitor Sitting Right arm       Pain Score       09/30/23 0801       7             Orthostatic Vital Signs  Vitals:    09/30/23 1430 09/30/23 1440 09/30/23 1500 09/30/23 1515   BP: 157/80 156/88 153/85 140/77   Pulse: 83 81 83 77   Patient Position - Orthostatic VS:           Physical Exam  Vitals and nursing note reviewed. Constitutional:       General: He is not in acute distress. Appearance: Normal appearance. He is well-developed. He is obese. He is not ill-appearing, toxic-appearing or diaphoretic. HENT:      Head: Normocephalic and atraumatic. Right Ear: External ear normal.      Left Ear: External ear normal.      Nose: Nose normal.      Mouth/Throat:      Mouth: Mucous membranes are moist.      Pharynx: Oropharynx is clear. Eyes:      General: No scleral icterus. Right eye: No discharge. Left eye: No discharge. Extraocular Movements: Extraocular movements intact. Cardiovascular:      Rate and Rhythm: Normal rate and regular rhythm. Pulses: Normal pulses. Heart sounds: Normal heart sounds. No murmur heard. No friction rub. No gallop. Pulmonary:      Effort: Pulmonary effort is normal. No respiratory distress. Breath sounds: Normal breath sounds. No stridor. No wheezing, rhonchi or rales. Chest:      Chest wall: No tenderness. Abdominal:      Palpations: Abdomen is soft. There is no hepatomegaly, splenomegaly, mass or pulsatile mass. Tenderness: There is abdominal tenderness in the right upper quadrant, right lower quadrant and epigastric area. There is no right CVA tenderness, left CVA tenderness, guarding or rebound. Positive signs include McBurney's sign. Negative signs include Tyson's sign, Rovsing's sign, psoas sign and obturator sign. Hernia: No hernia is present. There is no hernia in the umbilical area, ventral area, left inguinal area, right femoral area, left femoral area or right inguinal area.    Genitourinary:     Penis: Normal.       Testes: Normal. Cremasteric reflex is present. Right: Mass, tenderness or swelling not present. Left: Mass, tenderness or swelling not present. Musculoskeletal:      Cervical back: Neck supple. Skin:     General: Skin is warm and dry. Capillary Refill: Capillary refill takes less than 2 seconds. Coloration: Skin is not cyanotic, jaundiced, mottled or pale. Findings: No bruising, erythema, lesion, petechiae or rash. Neurological:      General: No focal deficit present. Mental Status: He is alert and oriented to person, place, and time. Mental status is at baseline.    Psychiatric:         Mood and Affect: Mood normal.         Behavior: Behavior normal.         ED Medications  Medications   HYDROmorphone (DILAUDID) injection 0.5 mg ( Intravenous MAR Hold 9/30/23 1227)   acetaminophen (TYLENOL) tablet 650 mg ( Oral MAR Hold 9/30/23 1227)   HYDROmorphone (DILAUDID) injection 0.5 mg ( Intravenous MAR Hold 9/30/23 1227)   lactated ringers infusion (has no administration in time range)   HYDROmorphone (DILAUDID) injection 0.5 mg (has no administration in time range)   lactated ringers infusion (has no administration in time range)   ondansetron (ZOFRAN) injection 4 mg (has no administration in time range)   oxyCODONE (ROXICODONE) IR tablet 5 mg (has no administration in time range)   sodium chloride 0.9 % bolus 1,000 mL (0 mL Intravenous Stopped 9/30/23 1030)   ondansetron (ZOFRAN) injection 4 mg (4 mg Intravenous Given 9/30/23 0842)   iohexol (OMNIPAQUE) 350 MG/ML injection (MULTI-DOSE) 100 mL (100 mL Intravenous Given 9/30/23 0946)   HYDROmorphone (DILAUDID) injection 0.5 mg (0.5 mg Intravenous Given 9/30/23 1030)   sodium chloride 0.9 % bolus 1,000 mL (1,000 mL Intravenous New Bag 9/30/23 1139)   metroNIDAZOLE (FLAGYL) tablet 500 mg (500 mg Oral Given 9/30/23 1203)   ceftriaxone (ROCEPHIN) 1 g/50 mL in dextrose IVPB (0 mg Intravenous Stopped 9/30/23 1200)   ceFAZolin (ANCEF) IVPB (premix in dextrose) 2,000 mg 50 mL ( Intravenous MAR Unhold 9/30/23 1256)   metroNIDAZOLE (FLAGYL) IVPB (premix) 500 mg 100 mL ( Intravenous Stopped 9/30/23 1310)       Diagnostic Studies  Results Reviewed     Procedure Component Value Units Date/Time    Protime-INR [[id number]]  (Normal) Collected: 09/30/23 1212    Lab Status: Final result Specimen: Blood from Arm, Left Updated: 09/30/23 1239     Protime 14.5 seconds      INR 1.06    APTT [[id number]]  (Normal) Collected: 09/30/23 1212    Lab Status: Final result Specimen: Blood from Arm, Left Updated: 09/30/23 1239     PTT 28 seconds     Lactic acid, plasma (w/reflex if result > 2.0) [[id number]]  (Normal) Collected: 09/30/23 1139    Lab Status: Final result Specimen: Blood from Arm, Left Updated: 09/30/23 1217     LACTIC ACID 0.7 mmol/L     Narrative:      Result may be elevated if tourniquet was used during collection. Blood culture #2 [[id number]] Collected: 09/30/23 1139    Lab Status: In process Specimen: Blood from Arm, Right Updated: 09/30/23 1144    Blood culture #1 [[id number]] Collected: 09/30/23 1139    Lab Status:  In process Specimen: Blood from Arm, Left Updated: 09/30/23 1144    UA w Reflex to Microscopic w Reflex to Culture [[id number]] Collected: 09/30/23 0959    Lab Status: Final result Specimen: Urine, Clean Catch Updated: 09/30/23 1019     Color, UA Colorless     Clarity, UA Clear     Specific Gravity, UA 1.022     pH, UA 7.0     Leukocytes, UA Negative     Nitrite, UA Negative     Protein, UA Negative mg/dl      Glucose, UA Negative mg/dl      Ketones, UA Negative mg/dl      Urobilinogen, UA <2.0 mg/dl      Bilirubin, UA Negative     Occult Blood, UA Negative    Lipase [[id number]]  (Abnormal) Collected: 09/30/23 0839    Lab Status: Final result Specimen: Blood from Arm, Left Updated: 09/30/23 0911     Lipase 9 u/L     Comprehensive metabolic panel [[id number]]  (Abnormal) Collected: 09/30/23 0838    Lab Status: Final result Specimen: Blood from Arm, Left Updated: 09/30/23 1158     Sodium 134 mmol/L      Potassium 3.4 mmol/L      Chloride 96 mmol/L      CO2 29 mmol/L      ANION GAP 9 mmol/L      BUN 10 mg/dL      Creatinine 1.09 mg/dL      Glucose 109 mg/dL      Calcium 9.4 mg/dL      AST 13 U/L      ALT 20 U/L      Alkaline Phosphatase 57 U/L      Total Protein 8.0 g/dL      Albumin 4.2 g/dL      Total Bilirubin 0.95 mg/dL      eGFR 82 ml/min/1.73sq m     Narrative:      National Kid[employer] Disease Foundation guidelines for Chronic Kidney Disease (CKD):   •  Stage 1 with normal or high GFR (GFR > 90 mL/min/1.73 square meters)  •  Stage 2 Mild CKD (GFR = 60-89 mL/min/1.73 square meters)  •  Stage 3A Moderate CKD (GFR = 45-59 mL/min/1.73 square meters)  •  Stage 3B Moderate CKD (GFR = 30-44 mL/min/1.73 square meters)  •  Stage 4 Severe CKD (GFR = 15-29 mL/min/1.73 square meters)  •  Stage 5 End Stage CKD (GFR <15 mL/min/1.73 square meters)  Note: GFR calculation is accurate only with a steady state creatinine    CBC and differential [[id number]]  (Abnormal) Collected: 09/30/23 0838    Lab Status: Final result Specimen: Blood from Arm, Left Updated: 09/30/23 0855     WBC 14.25 Thousand/uL      RBC 5.85 Million/uL      Hemoglobin 15.9 g/dL      Hematocrit 48.3 %      MCV 83 fL      MCH 27.2 pg      MCHC 32.9 g/dL      RDW 14.9 %      MPV 10.4 fL      Platelets 187 Thousands/uL      nRBC 0 /100 WBCs      Neutrophils Relative 74 %      Immat GRANS % 1 %      Lymphocytes Relative 16 %      Monocytes Relative 9 %      Eosinophils Relative 0 %      Basophils Relative 0 %      Neutrophils Absolute 10.61 Thousands/µL      Immature Grans Absolute 0.07 Thousand/uL      Lymphocytes Absolute 2.21 Thousands/µL      Monocytes Absolute 1.33 Thousand/µL      Eosinophils Absolute 0.00 Thousand/µL      Basophils Absolute 0.03 Thousands/µL                   right upper quadrant   Final Result by Blanco Piper MD (09/30 2890)   Cholelithiasis with gallbladder filled with shadowing producing stones. Sonographic signs of acute cholecystitis, as previously suspected. No significant biliary dilatation. Workstation performed: [id number]         CT abdomen pelvis with contrast   Final Result by Vivien Johnson MD (09/30 1022)   Extensive inflammatory changes at the gallbladder, compatible with acute cholecystitis. No radiopaque stones. No biliary dilatation. Diverticulosis in the descending and sigmoid colon. No evidence of diverticulitis. Minimal free intra-abdominal fluid, possibly reactive. Other findings, as per the body of the report. Workstation performed: [id number]               Procedures  Procedures      ED Course  ED Course as of 09/30/23 1610   Sat Sep 30, 2023   0855 WBC(!): 14.25   0855 Absolute Neutrophils(!): 10.61   1032 IMPRESSION:  Extensive inflammatory changes at the gallbladder, compatible with acute cholecystitis. No radiopaque stones. No biliary dilatation. Diverticulosis in the descending and sigmoid colon. No evidence of diverticulitis. Minimal free intra-abdominal fluid, possibly reactive. Other findings, as per the body of the report.              Workstation performed: [id number]   4068 IMPRESSION:  Cholelithiasis with gallbladder filled with shadowing producing stones. Sonographic signs of acute cholecystitis, as previously suspected. No significant biliary dilatation.     Workstation performed: [id number]   0861 Reached out to surgery again   1207 Lap karol with Dr. Hema Mondragon Name 09/30/23 1204                Is the patient's history suggestive of a new or worsening infection?  Yes (Proceed)  -AB        Suspected source of infection acute abdominal infection  -AB        Indicate SIRS criteria Tachycardia > 90 bpm;Leukocytosis (WBC > 17271 IJL) OR Leukopenia (WBC <4000 IJL) OR Bandemia (WBC >10% bands)  -AB        Are two or more of the above signs & symptoms of infection both present and new to the patient? Yes (Proceed)  -AB        Assess for evidence of organ dysfunction: Are any of the below criteria present within 6 hours of suspected infection and SIRS criteria that are NOT considered to be chronic conditions? --              User Key  (r) = Recorded By, (t) = Taken By, (c) = Cosigned By    [address] [place] [address] Name Provider Type    Mica Jim MD Resident                          Medical Decision Making  58-year-old male with history of appendectomy presents with abdominal pain and decreased food intake. Differential includes but not limited to stump appendicitis, SBO, LBO, cholecystitis, gastritis, GERD, food poisoning, medication adverse side effect. Dilaudid for pain. CBC, CMP, lipase, UA, CT abdomen pelvis. CT abdomen pelvis notable for inflammation around the gallbladder, bedside ultrasound notable for stones throughout the gallbladder. Cholecystitis. General surgery notified. Advised with require lap appendectomy. N.p.o., type and screen, ceftriaxone, Flagyl, fluids. Patient admitted to general surgery. Amount and/or Complexity of Data Reviewed  Labs: ordered. Decision-making details documented in ED Course. Radiology: ordered. Risk  Prescription drug management. Decision regarding hospitalization.             Disposition  Final diagnoses:   Cholecystitis     Time reflects when diagnosis was documented in both MDM as applicable and the Disposition within this note     Time User Action Codes Description Comment    9/30/2023 10:52 AM Howard Robins Add [K81.9] Cholecystitis     9/30/2023 12:46 PM Robbie Lopez Add [K80.00] Acute cholecystitis due to biliary calculus     9/30/2023  1:42 PM Krystina Duran Modify [K81.9] Cholecystitis       ED Disposition     ED Disposition   Admit    Condition   Stable    Date/Time   Sat Sep 30, 2023 10:52 AM    Comment   Pending Sx eval for cholecystitis         Follow-up Information     Follow up With Specialties Details Why Contact Info    Archie January Gretta Lu DO General Surgery Schedule an appointment as soon as possible for a visit in 2 week(s)  [address]  [phone]            Discharge Medication List as of 9/30/2023  3:24 PM      START taking these medications    Details   oxyCODONE (ROXICODONE) 5 immediate release tablet Take 1 tablet (5 mg total) by mouth every 4 (four) hours as needed for moderate pain for up to 10 days Max Daily Amount: 30 mg, Starting Sat 9/30/2023, Until Tue 10/10/2023 at 2359, Normal         CONTINUE these medications which have NOT CHANGED    Details   amLODIPine (NORVASC) 5 mg tablet TAKE 1 TABLET (5 MG TOTAL) BY MOUTH DAILY. , Starting Wed 8/2/2023, Normal      nebivolol (BYSTOLIC) 5 mg tablet TAKE 1 TABLET (5 MG TOTAL) BY MOUTH DAILY. , Starting Tue 8/1/2023, Normal      omeprazole (PriLOSEC) 40 MG capsule TAKE 1 CAPSULE BY MOUTH TWICE A DAY, Normal      valsartan-hydrochlorothiazide (DIOVAN-HCT) 320-25 MG per tablet Take 1 tablet by mouth daily, Starting Thu 3/16/2023, Normal      Wegovy 2.4 MG/0.75ML INJECT 0.75ML (2.4MG TOTAL) SUBCUTANEOUSLY ONCE A WEEK, Normal           No discharge procedures on file. PDMP Review     None           ED Provider  Attending physically available and evaluated Jos Claros. I managed the patient along with the ED Attending.     Electronically Signed by         Zack Simmons MD  09/30/23 4051

## 2023-09-30 NOTE — ANESTHESIA PREPROCEDURE EVALUATION
Procedure:  CHOLECYSTECTOMY LAPAROSCOPIC (Abdomen)    Relevant Problems   CARDIO   (+) Hypertension      GI/HEPATIC   (+) GERD (gastroesophageal reflux disease)   (+) Hiatal hernia      MUSCULOSKELETAL   (+) Hiatal hernia      PULMONARY   (+) Obstructive sleep apnea        Physical Exam    Airway    Mallampati score: II  TM Distance: >3 FB  Neck ROM: full     Dental   Comment: Braces  ,     Cardiovascular      Pulmonary      Other Findings        Anesthesia Plan  ASA Score- 2     Anesthesia Type- general with ASA Monitors. Additional Monitors:   Airway Plan: ETT. Comment: Last PO:half of a waffle at 04:30 AM. Now NPO >8 hrs. Plan Factors-Exercise tolerance (METS): >4 METS. Chart reviewed. Existing labs reviewed. Patient summary reviewed. Induction- intravenous. Postoperative Plan- Plan for postoperative opioid use. Informed Consent- Anesthetic plan and risks discussed with patient. I personally reviewed this patient with the CRNA. Discussed and agreed on the Anesthesia Plan with the CRNA. Kvng Garcia

## 2023-09-30 NOTE — OP NOTE
OPERATIVE REPORT  PATIENT NAME: Eli Almaguer    :  1980  MRN: [id number]  Pt Location: [facility] OR ROOM 01    SURGERY DATE: 2023    Surgeon(s) and Role:     * Robbie Vazquez Side, DO - Primary    Preop Diagnosis:  Cholecystitis [K81.9] acute calculus    Post-Op Diagnosis Codes:     * Cholecystitis [K81.9] acute calculus    Procedure(s):  CHOLECYSTECTOMY LAPAROSCOPIC    Specimen(s):  ID Type Source Tests Collected by Time Destination   1 : Gallbladder Tissue Gallbladder TISSUE EXAM Vale Pendleton Irineokedar,  2023 1342        Estimated Blood Loss:   Minimal    Drains:  * No LDAs found *    Anesthesia Type:   General/local    Operative Indications:  Cholecystitis [K81.9]  Acute calculus    Operative Findings:  Extensive acute calculus cholecystitis. Height 72 inches. Weight 136 kg / 200 pounds. BMI 42  ASA 3. Wound class II    Complications:   None    Procedure and Technique:  Patient was brought the operative suite and identified by visualization, conversation, by armband. Sequential compression pumps were placed. He was given Ancef and Flagyl perioperatively. Once under general anesthesia abdomen was prepped and draped in a sterile fashion. Timeout was performed was assured that the prep was dry. Local was instilled at the supraumbilical fold. Small vertical skin incision was made and the subcutaneous tissues were bluntly dissected with Pearson clamps down to the fascia. Fascia was lifted up and divided the midline. We were able to poke the underlying peritoneum gaining access into the abdominal cavity. An 11 mm trocar was placed under direct visualization. CO2 was attached crating a pneumoperitoneum 3 other 5 mm bladeless trocars were then placed in the right upper quadrant. The gallbladder was attempted to be lifted superiorly. It was quite firm and hard and edematous. This was decompressed of thick dark inspissated bile with the Nezhat aspirating needle.   This did make it more manageable. Gallbladder is lifted further cephalad. Omental adhesions to the undersurface as well as to the liver were divided with a combination of blunt dissection and use of the harmonic scalpel. As we get to the neck of the gallbladder this is lifted anteriorly and laterally. The neck had actually fallen below the cystic duct structures. Once the entire neck was lifted up the cystic duct was readily identifiable with blunt dissection. This was identified going directly into the gallbladder. This was quadruple he clipped and then divided. Blunt dissection enabled us to then identify the cystic artery. This was triply clipped and divided. Gallbladder was then taken off the liver using variable speed the harmonic scalpel and there was excellent hemostasis. Gallbladder is placed into an Endo Catch bag. Irrigation was carried out. Omentum was placed in the gallbladder fossa. Pneumoperitoneum was evacuated. The umbilical incision did have to be extended to allow the gallbladder to be removed. Once out other trocars removed. Fascia at the umbilical site was closing 0 Vicryl in a figure-of-eight fashion x4. Local was instilled. 4-0 Monocryl was used to close skin incisions in a subicular fashion. Wounds were washed and dried. Sterile skin glue was applied. He was awakened in the operating room and returned to the recovery area in stable condition having tolerated the procedure well. I was present for the entire procedure.     Patient Disposition:  PACU         SIGNATURE: Brisa Beebe DO  DATE: September 30, 2023  TIME: 1:55 PM

## 2023-09-30 NOTE — LETTER
[address] [facility] [address]  Dept: [phone]    September 30, 2023     Patient: Can Lee   YOB: 1980   Date of Visit: 9/30/2023       To Whom it May Concern:    Werner Edmondson is under my professional care. He was seen in the hospital from 9/30/2023 to 09/30/23. He may return to school on 10/16/2023 without limitations. Please allow him to obtain appropriate follow up 1-2 weeks after his procedure. He may return sooner if he feels well, but must avoid lifting more than 20 lbs for 2 weeks.     If you have any questions or concerns, please don't hesitate to call the office of Dr. Flo Lara ([phone])         Sincerely,          Keke Pandey MD

## 2023-10-05 LAB
BACTERIA BLD CULT: NORMAL
BACTERIA BLD CULT: NORMAL

## 2023-10-18 ENCOUNTER — OFFICE VISIT (OUTPATIENT)
Dept: SURGERY | Facility: CLINIC | Age: 43
End: 2023-10-18

## 2023-10-18 DIAGNOSIS — K80.00 ACUTE CHOLECYSTITIS DUE TO BILIARY CALCULUS: Primary | ICD-10-CM

## 2023-10-18 PROCEDURE — 99024 POSTOP FOLLOW-UP VISIT: CPT | Performed by: SURGERY
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
## 2023-10-26 ENCOUNTER — OFFICE VISIT (OUTPATIENT)
Dept: BARIATRICS | Facility: CLINIC | Age: 43
End: 2023-10-26
Payer: COMMERCIAL

## 2023-10-26 VITALS
BODY MASS INDEX: 41.79 KG/M2 | HEART RATE: 75 BPM | SYSTOLIC BLOOD PRESSURE: 138 MMHG | WEIGHT: 298.5 LBS | HEIGHT: 71 IN | DIASTOLIC BLOOD PRESSURE: 94 MMHG

## 2023-10-26 DIAGNOSIS — E66.01 OBESITY, CLASS III, BMI 40-49.9 (MORBID OBESITY) (HCC): ICD-10-CM

## 2023-10-26 DIAGNOSIS — K21.9 GERD (GASTROESOPHAGEAL REFLUX DISEASE): Primary | ICD-10-CM

## 2023-10-26 PROCEDURE — 99213 OFFICE O/P EST LOW 20 MIN: CPT | Performed by: SURGERY
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
## 2023-10-30 DIAGNOSIS — Z01.818 PRE-OP TESTING: Primary | ICD-10-CM

## 2023-11-07 ENCOUNTER — TELEPHONE (OUTPATIENT)
Age: 43
End: 2023-11-07

## 2023-11-07 ENCOUNTER — OFFICE VISIT (OUTPATIENT)
Dept: FAMILY MEDICINE CLINIC | Facility: CLINIC | Age: 43
End: 2023-11-07
Payer: COMMERCIAL

## 2023-11-07 VITALS
SYSTOLIC BLOOD PRESSURE: 122 MMHG | DIASTOLIC BLOOD PRESSURE: 86 MMHG | TEMPERATURE: 96.4 F | OXYGEN SATURATION: 100 % | WEIGHT: 302 LBS | HEIGHT: 71 IN | HEART RATE: 68 BPM | RESPIRATION RATE: 16 BRPM | BODY MASS INDEX: 42.28 KG/M2

## 2023-11-07 DIAGNOSIS — I10 PRIMARY HYPERTENSION: Primary | ICD-10-CM

## 2023-11-07 DIAGNOSIS — K21.9 GASTROESOPHAGEAL REFLUX DISEASE, UNSPECIFIED WHETHER ESOPHAGITIS PRESENT: ICD-10-CM

## 2023-11-07 DIAGNOSIS — Z23 ENCOUNTER FOR IMMUNIZATION: ICD-10-CM

## 2023-11-07 DIAGNOSIS — K44.9 HIATAL HERNIA: ICD-10-CM

## 2023-11-07 DIAGNOSIS — E66.01 MORBID OBESITY WITH BMI OF 45.0-49.9, ADULT (HCC): ICD-10-CM

## 2023-11-07 PROBLEM — K80.00 ACUTE CHOLECYSTITIS DUE TO BILIARY CALCULUS: Status: RESOLVED | Noted: 2023-09-30 | Resolved: 2023-11-07

## 2023-11-07 PROCEDURE — 90686 IIV4 VACC NO PRSV 0.5 ML IM: CPT

## 2023-11-07 PROCEDURE — 90471 IMMUNIZATION ADMIN: CPT

## 2023-11-07 PROCEDURE — 99214 OFFICE O/P EST MOD 30 MIN: CPT | Performed by: FAMILY MEDICINE

## 2023-11-07 RX ORDER — SEMAGLUTIDE 2.4 MG/.75ML
2.4 INJECTION, SOLUTION SUBCUTANEOUS WEEKLY
Qty: 4 ML | Refills: 5 | Status: SHIPPED | OUTPATIENT
Start: 2023-11-07

## 2023-11-07 NOTE — PROGRESS NOTES
Assessment/Plan:    1. Primary hypertension  Assessment & Plan:  Stable on current meds      2. Morbid obesity with BMI of 45.0-49.9, adult [facility])  Assessment & Plan:  Losing weight with wegovy    Orders:  -     Semaglutide-Weight Management [facility]) 2.4 MG/0.75ML; Inject 0.75 mL (2.4 mg total) under the skin once a week    3. Hiatal hernia  Assessment & Plan:  Did meet with bariatric   Need repair      4. Gastroesophageal reflux disease, unspecified whether esophagitis present  Assessment & Plan:  Likely from hiatal hernia      5. Encounter for immunization  -     influenza vaccine, quadrivalent, 0.5 mL, preservative-free, for adult and pediatric patients 6 mos+ (AFLURIA, FLUARIX, FLULAVAL, FLUZONE)        Depression Screening and Follow-up Plan: Patient was screened for depression during today's encounter. They screened negative with a PHQ-2 score of 0. Tobacco Cessation Counseling: The patient is sincerely urged to quit consumption of tobacco. He is not ready to quit tobacco.          There are no Patient Instructions on file for this visit. Return in about 6 months (around 5/7/2024) for Annual physical.    Subjective:      Patient ID: Sulma Tariq is a 39 y.o. male. Chief Complaint   Patient presents with   • Follow-up     Patient being seen for 6 month follow up        Here for follow up  Bp in good range  On wegovy- off for a short time now  May go back after thanksgiving  Left ankle painful  No gym injury  Was exercising more  Had gallbladder removed in september    Hypertension  This is a chronic problem. The current episode started more than 1 year ago. The problem is unchanged. The problem is controlled. There are no associated agents to hypertension. The current treatment provides significant improvement. There are no compliance problems.         The following portions of the patient's history were reviewed and updated as appropriate: allergies, current medications, past family history, past medical history, past social history, past surgical history and problem list.    Review of Systems   Constitutional: Negative. HENT: Negative. Eyes: Negative. Respiratory: Negative. Cardiovascular: Negative. Gastrointestinal: Negative. Endocrine: Negative. Genitourinary: Negative. Musculoskeletal: Negative. Skin: Negative. Allergic/Immunologic: Negative. Neurological: Negative. Hematological: Negative. Psychiatric/Behavioral: Negative. Current Outpatient Medications   Medication Sig Dispense Refill   • amLODIPine (NORVASC) 5 mg tablet TAKE 1 TABLET (5 MG TOTAL) BY MOUTH DAILY. 90 tablet 0   • nebivolol (BYSTOLIC) 5 mg tablet TAKE 1 TABLET (5 MG TOTAL) BY MOUTH DAILY. 90 tablet 1   • omeprazole (PriLOSEC) 40 MG capsule TAKE 1 CAPSULE BY MOUTH TWICE A  capsule 3   • Semaglutide-Weight Management (Wegovy) 2.4 MG/0.75ML Inject 0.75 mL (2.4 mg total) under the skin once a week 4 mL 5   • valsartan-hydrochlorothiazide (DIOVAN-HCT) 320-25 MG per tablet Take 1 tablet by mouth daily 90 tablet 3     No current facility-administered medications for this visit. Objective:    /86 (BP Location: Left arm, Patient Position: Sitting, Cuff Size: Large)   Pulse 68   Temp (!) 96.4 °F (35.8 °C) (Tympanic)   Resp 16   Ht 5' 10.5" (1.791 m)   Wt (!) 137 kg (302 lb)   SpO2 100%   BMI 42.72 kg/m²        Physical Exam  Vitals and nursing note reviewed. Constitutional:       Appearance: Normal appearance. He is well-developed. HENT:      Head: Normocephalic and atraumatic. Right Ear: External ear normal.      Left Ear: External ear normal.      Nose: Nose normal.   Eyes:      General: Lids are normal.      Extraocular Movements: Extraocular movements intact. Conjunctiva/sclera: Conjunctivae normal.      Pupils: Pupils are equal, round, and reactive to light. Cardiovascular:      Rate and Rhythm: Normal rate and regular rhythm.       Pulses: Normal pulses. Heart sounds: Normal heart sounds, S1 normal and S2 normal.   Pulmonary:      Effort: Pulmonary effort is normal.      Breath sounds: Normal breath sounds. Abdominal:      General: Abdomen is flat. Bowel sounds are normal.      Palpations: Abdomen is soft. Musculoskeletal:         General: Normal range of motion. Cervical back: Normal range of motion and neck supple. Skin:     General: Skin is warm and dry. Capillary Refill: Capillary refill takes less than 2 seconds. Neurological:      General: No focal deficit present. Mental Status: He is alert and oriented to person, place, and time. Deep Tendon Reflexes: Reflexes are normal and symmetric. Psychiatric:         Speech: Speech normal.         Behavior: Behavior normal.         Thought Content:  Thought content normal.         Judgment: Judgment normal.                Brisa Arora,

## 2023-11-07 NOTE — TELEPHONE ENCOUNTER
PA on file  Your PA request has been closed.  WEGOVY 2.4MG INJ #9/71 days //  Duplicate request, PA approval on file expires  09/25/2024

## 2023-11-07 NOTE — TELEPHONE ENCOUNTER
PA for [facility]ILDA CHIU submitted via [employer]. Case ID: [id number]. Clinical questions answered. Office notes sent. Awaiting determination.

## 2023-11-23 DIAGNOSIS — I10 ESSENTIAL HYPERTENSION: ICD-10-CM

## 2023-11-24 RX ORDER — AMLODIPINE BESYLATE 5 MG/1
5 TABLET ORAL DAILY
Qty: 90 TABLET | Refills: 1 | Status: SHIPPED | OUTPATIENT
Start: 2023-11-24

## 2023-11-30 DIAGNOSIS — K44.9 HIATAL HERNIA: Primary | ICD-10-CM

## 2023-12-13 DIAGNOSIS — E66.01 MORBID OBESITY WITH BMI OF 45.0-49.9, ADULT (HCC): Primary | ICD-10-CM

## 2024-01-22 ENCOUNTER — OFFICE VISIT (OUTPATIENT)
Dept: GASTROENTEROLOGY | Facility: AMBULARY SURGERY CENTER | Age: 44
End: 2024-01-22
Payer: COMMERCIAL

## 2024-01-22 VITALS
BODY MASS INDEX: 43.26 KG/M2 | HEIGHT: 71 IN | DIASTOLIC BLOOD PRESSURE: 88 MMHG | WEIGHT: 309 LBS | SYSTOLIC BLOOD PRESSURE: 128 MMHG | OXYGEN SATURATION: 100 % | HEART RATE: 68 BPM

## 2024-01-22 DIAGNOSIS — K21.9 GASTROESOPHAGEAL REFLUX DISEASE, UNSPECIFIED WHETHER ESOPHAGITIS PRESENT: Primary | ICD-10-CM

## 2024-01-22 DIAGNOSIS — K44.9 HIATAL HERNIA: ICD-10-CM

## 2024-01-22 DIAGNOSIS — E66.01 CLASS 3 SEVERE OBESITY WITH BODY MASS INDEX (BMI) OF 40.0 TO 44.9 IN ADULT, UNSPECIFIED OBESITY TYPE, UNSPECIFIED WHETHER SERIOUS COMORBIDITY PRESENT (HCC): ICD-10-CM

## 2024-01-22 DIAGNOSIS — Z12.11 SCREENING FOR COLON CANCER: ICD-10-CM

## 2024-01-22 PROCEDURE — 99214 OFFICE O/P EST MOD 30 MIN: CPT | Performed by: INTERNAL MEDICINE

## 2024-01-22 NOTE — PROGRESS NOTES
[facility] - Outpatient Follow-up Note  Dudley Madsen 43 y.o. male MRN: [id number]  Encounter: [id number]          ASSESSMENT AND PLAN:      1. Hiatal hernia  2. Gastroesophageal reflux disease, unspecified whether esophagitis present  3. Class 3 severe obesity with body mass index (BMI) of 40.0 to 44.9 in adult, unspecified obesity type, unspecified whether serious comorbidity present ([facility])  Initial EGD in November 2022 with evidence of erosive esophagitis at the GE junction, his pH and manometry confirmed normal esophageal motility, sliding hiatal hernia with objective evidence of acid exposure correlating with his symptoms of acidic regurgitation  He continues on PPI  He was referred for antireflux surgery, given his high BMI, he is at risk for recurrence of hernia, and this would discussed with him at length  He refuses Jack-en-Y gastric bypass, and alternatively  He is planned for hiatal hernia surgery, with our bariatrics team  Will discuss with bariatric regarding need associated advanced endoscopic procedure  - EGD; Future, recommend repeat EGD to confirm esophageal healing    4. Screening for colon cancer  Average risk, index screening colonoscopy  - Colonoscopy; Future  - sodium picosulfate, magnesium oxide, citric acid (Clenpiq) oral solution; Take 175 mL (1 bottle) the evening before the colonoscopy, between 5 PM and 9 PM, followed by a second 175 mL bottle 5 hours before the colonoscopy.  Dispense: 350 mL; Refill: 0    ______________________________________________________________________    SUBJECTIVE:      Patient is a 43-year-old male initially seen by me in September 2022 with GERD, hiatal hernia, BMI 43. He is s/p lap karol due to cholelithiasis, planned for hiatal hernia repair with Dr. Obed Chambers.  He is on Prilosec 40 mg twice daily.  He does continue to experience symptoms of acid regurgitation.    EGD Nov 2022-erosive esophagitis at the GE junction,  Ph/manometry  "Feb 2023-normal esophageal motility, small to medium sized sliding hiatal hernia, [place]ter 29, with symptom correlation of acidic regurgitation, and significant for acid reflux disease  RUQ us sept 2023-  CT abdomen/pelvis Sept 2023- c/w acute cholecystitis    Abdominal Pain  This is a recurrent problem. The current episode started more than 1 year ago. The onset quality is undetermined. The problem occurs daily. The problem has been waxing and waning. The pain is located in the epigastric region. The pain is at a severity of 5/10. The quality of the pain is sharp. Pertinent negatives include no anorexia, arthralgias, belching, constipation, diarrhea, dysuria, fever, flatus, frequency, headaches, hematochezia, hematuria, melena, myalgias, nausea, vomiting or weight loss. The pain is aggravated by eating.     I personally reviewed external procedure reports.     Objective     Blood pressure 128/88, pulse 68, height 5' 10.5\" (1.791 m), weight (!) 140 kg (309 lb), SpO2 100%. Body mass index is 43.71 kg/m².      PHYSICAL EXAM:      General Appearance:   Alert, cooperative, no distress   HEENT:   Normocephalic, atraumatic, anicteric.     Neck:  Supple, symmetrical, trachea midline   Lungs:   Clear to auscultation bilaterally; no rales, rhonchi or wheezing; respirations unlabored    Heart::   Regular rate and rhythm; no murmur, rub, or gallop.   Abdomen:   well-healed laparoscopic scars in right upper quadrant consistent with recent cholecystectomy   Genitalia:   Deferred    Rectal:   Deferred    Extremities:  No cyanosis, clubbing or edema    Pulses:  2+ and symmetric    Skin:  No jaundice, rashes, or lesions    Lymph nodes:  No palpable cervical lymphadenopathy        Lab Results:   No visits with results within 1 Day(s) from this visit.   Latest known visit with results is:   Admission on 09/30/2023, Discharged on 09/30/2023   Component Date Value    WBC 09/30/2023 14.25 (H)     RBC 09/30/2023 5.85 (H)     Hemoglobin " 09/30/2023 15.9     Hematocrit 09/30/2023 48.3     MCV 09/30/2023 83     MCH 09/30/2023 27.2     MCHC 09/30/2023 32.9     RDW 09/30/2023 14.9     MPV 09/30/2023 10.4     Platelets 09/30/2023 293     nRBC 09/30/2023 0     Neutrophils Relative 09/30/2023 74     Immat GRANS % 09/30/2023 1     Lymphocytes Relative 09/30/2023 16     Monocytes Relative 09/30/2023 9     Eosinophils Relative 09/30/2023 0     Basophils Relative 09/30/2023 0     Neutrophils Absolute 09/30/2023 10.61 (H)     Immature Grans Absolute 09/30/2023 0.07     Lymphocytes Absolute 09/30/2023 2.21     Monocytes Absolute 09/30/2023 1.33 (H)     Eosinophils Absolute 09/30/2023 0.00     Basophils Absolute 09/30/2023 0.03     Sodium 09/30/2023 134 (L)     Potassium 09/30/2023 3.4 (L)     Chloride 09/30/2023 96     CO2 09/30/2023 29     ANION GAP 09/30/2023 9     BUN 09/30/2023 10     Creatinine 09/30/2023 1.09     Glucose 09/30/2023 109     Calcium 09/30/2023 9.4     AST 09/30/2023 13     ALT 09/30/2023 20     Alkaline Phosphatase 09/30/2023 57     Total Protein 09/30/2023 8.0     Albumin 09/30/2023 4.2     Total Bilirubin 09/30/2023 0.95     eGFR 09/30/2023 82     Lipase 09/30/2023 9 (L)     Color, UA 09/30/2023 Colorless     Clarity, UA 09/30/2023 Clear     Specific Gravity, UA 09/30/2023 1.022     pH, UA 09/30/2023 7.0     Leukocytes, UA 09/30/2023 Negative     Nitrite, UA 09/30/2023 Negative     Protein, UA 09/30/2023 Negative     Glucose, UA 09/30/2023 Negative     Ketones, UA 09/30/2023 Negative     Urobilinogen, UA 09/30/2023 <2.0     Bilirubin, UA 09/30/2023 Negative     Occult Blood, UA 09/30/2023 Negative     Protime 09/30/2023 14.5     INR 09/30/2023 1.06     PTT 09/30/2023 28     LACTIC ACID 09/30/2023 0.7     ABO Grouping 09/30/2023 O     Rh Factor 09/30/2023 Positive     Antibody Screen 09/30/2023 Negative     Specimen Expiration Date 09/30/2023 20231003     Blood Culture 09/30/2023 No Growth After 5 Days.     Blood Culture 09/30/2023 No  Growth After 5 Days.     ABO Grouping 09/30/2023 O     Rh Factor 09/30/2023 Positive     Case Report 09/30/2023                      Value:Surgical Pathology Report                         Case: [id number]                                   Authorizing Provider:  Robbie Lopez DO   Collected:           09/30/2023 1342              Ordering Location:     [facility]        Received:            09/30/2023 [address] [place] [address] Operating Room                                                      Pathologist:           Carol Mann DO                                                     Specimen:    Gallbladder                                                                                Final Diagnosis 09/30/2023                      Value:This result contains rich text formatting which cannot be displayed here.    Additional Information 09/30/2023                      Value:This result contains rich text formatting which cannot be displayed here.    Gross Description 09/30/2023                      Value:This result contains rich text formatting which cannot be displayed here.         Radiology Results:   CT abdomen/pelvis Sept 2023- c/w acute cholecystitis    I personally reviewed relevant images in PACS.

## 2024-01-22 NOTE — PATIENT INSTRUCTIONS
Scheduled date of EGD/colonoscopy (as of today): 04/25/24  Physician performing EGD/colonoscopy:Dr. Tse  Location of [facility]colonoscopy[facility]  Desired bowel prep reviewed with patient: clenpiq  Instructions reviewed with patient by:antolin  Clearances:  leatha

## 2024-02-16 ENCOUNTER — OFFICE VISIT (OUTPATIENT)
Dept: BARIATRICS | Facility: CLINIC | Age: 44
End: 2024-02-16
Payer: COMMERCIAL

## 2024-02-16 VITALS
WEIGHT: 313.5 LBS | TEMPERATURE: 97.8 F | HEIGHT: 71 IN | DIASTOLIC BLOOD PRESSURE: 92 MMHG | BODY MASS INDEX: 43.89 KG/M2 | SYSTOLIC BLOOD PRESSURE: 144 MMHG | HEART RATE: 64 BPM

## 2024-02-16 DIAGNOSIS — K44.9 HIATAL HERNIA: ICD-10-CM

## 2024-02-16 DIAGNOSIS — K21.9 GERD (GASTROESOPHAGEAL REFLUX DISEASE): Primary | ICD-10-CM

## 2024-02-16 PROCEDURE — 99213 OFFICE O/P EST LOW 20 MIN: CPT | Performed by: SURGERY
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
## 2024-02-21 DIAGNOSIS — I10 ESSENTIAL HYPERTENSION: ICD-10-CM

## 2024-02-21 DIAGNOSIS — K21.9 GERD WITHOUT ESOPHAGITIS: ICD-10-CM

## 2024-02-21 PROBLEM — Z00.00 WELL ADULT EXAM: Status: RESOLVED | Noted: 2018-05-01 | Resolved: 2024-02-21

## 2024-02-21 RX ORDER — VALSARTAN AND HYDROCHLOROTHIAZIDE 320; 25 MG/1; MG/1
1 TABLET, FILM COATED ORAL DAILY
Qty: 90 TABLET | Refills: 1 | Status: SHIPPED | OUTPATIENT
Start: 2024-02-21

## 2024-02-21 RX ORDER — OMEPRAZOLE 40 MG/1
CAPSULE, DELAYED RELEASE ORAL
Qty: 180 CAPSULE | Refills: 1 | Status: SHIPPED | OUTPATIENT
Start: 2024-02-21

## 2024-03-28 DIAGNOSIS — I10 PRIMARY HYPERTENSION: ICD-10-CM

## 2024-03-28 RX ORDER — NEBIVOLOL 5 MG/1
5 TABLET ORAL DAILY
Qty: 90 TABLET | Refills: 1 | Status: SHIPPED | OUTPATIENT
Start: 2024-03-28

## 2024-04-10 ENCOUNTER — ANESTHESIA (OUTPATIENT)
Dept: ANESTHESIOLOGY | Facility: HOSPITAL | Age: 44
End: 2024-04-10

## 2024-04-10 ENCOUNTER — OFFICE VISIT (OUTPATIENT)
Dept: SLEEP CENTER | Facility: CLINIC | Age: 44
End: 2024-04-10
Payer: COMMERCIAL

## 2024-04-10 ENCOUNTER — ANESTHESIA EVENT (OUTPATIENT)
Dept: ANESTHESIOLOGY | Facility: HOSPITAL | Age: 44
End: 2024-04-10

## 2024-04-10 VITALS
SYSTOLIC BLOOD PRESSURE: 120 MMHG | HEIGHT: 70 IN | BODY MASS INDEX: 45.1 KG/M2 | WEIGHT: 315 LBS | DIASTOLIC BLOOD PRESSURE: 92 MMHG

## 2024-04-10 DIAGNOSIS — F51.12 INSUFFICIENT SLEEP SYNDROME: ICD-10-CM

## 2024-04-10 DIAGNOSIS — G47.33 OBSTRUCTIVE SLEEP APNEA: Primary | ICD-10-CM

## 2024-04-10 DIAGNOSIS — K21.9 GERD WITHOUT ESOPHAGITIS: ICD-10-CM

## 2024-04-10 DIAGNOSIS — E66.01 MORBID OBESITY WITH BMI OF 40.0-44.9, ADULT (HCC): ICD-10-CM

## 2024-04-10 DIAGNOSIS — I10 ESSENTIAL HYPERTENSION: ICD-10-CM

## 2024-04-10 PROCEDURE — 99214 OFFICE O/P EST MOD 30 MIN: CPT | Performed by: INTERNAL MEDICINE
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
## 2024-04-10 NOTE — PATIENT INSTRUCTIONS

## 2024-04-11 ENCOUNTER — TELEPHONE (OUTPATIENT)
Age: 44
End: 2024-04-11

## 2024-04-11 ENCOUNTER — TELEPHONE (OUTPATIENT)
Dept: SLEEP CENTER | Facility: CLINIC | Age: 44
End: 2024-04-11

## 2024-04-11 LAB
DME PARACHUTE DELIVERY DATE REQUESTED: NORMAL
DME PARACHUTE DELIVERY DATE REQUESTED: NORMAL
DME PARACHUTE ITEM DESCRIPTION: NORMAL
DME PARACHUTE ORDER STATUS: NORMAL
DME PARACHUTE ORDER STATUS: NORMAL
DME PARACHUTE SUPPLIER NAME: NORMAL
DME PARACHUTE SUPPLIER NAME: NORMAL
DME PARACHUTE SUPPLIER PHONE: NORMAL
DME PARACHUTE SUPPLIER PHONE: NORMAL

## 2024-04-11 NOTE — TELEPHONE ENCOUNTER
Scheduled date of colonoscopy (as of today): 8/12/24  Physician performing colonoscopy: Dedrick  Location of colonoscopy: [place]

## 2024-04-15 ENCOUNTER — OFFICE VISIT (OUTPATIENT)
Dept: BARIATRICS | Facility: CLINIC | Age: 44
End: 2024-04-15
Payer: COMMERCIAL

## 2024-04-15 VITALS
TEMPERATURE: 97.6 F | DIASTOLIC BLOOD PRESSURE: 92 MMHG | BODY MASS INDEX: 45.1 KG/M2 | HEIGHT: 70 IN | WEIGHT: 315 LBS | SYSTOLIC BLOOD PRESSURE: 134 MMHG | HEART RATE: 77 BPM

## 2024-04-15 DIAGNOSIS — E66.01 MORBID OBESITY WITH BMI OF 45.0-49.9, ADULT (HCC): Primary | ICD-10-CM

## 2024-04-15 DIAGNOSIS — K21.9 GASTROESOPHAGEAL REFLUX DISEASE, UNSPECIFIED WHETHER ESOPHAGITIS PRESENT: ICD-10-CM

## 2024-04-15 DIAGNOSIS — E66.01 MORBID OBESITY WITH BMI OF 40.0-44.9, ADULT (HCC): ICD-10-CM

## 2024-04-15 PROCEDURE — 99215 OFFICE O/P EST HI 40 MIN: CPT | Performed by: PHYSICIAN ASSISTANT
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
## 2024-04-15 NOTE — ASSESSMENT & PLAN NOTE
-Discussed options of HealthyCORE-Intensive Lifestyle Intervention Program, Very Low Calorie Diet-VLCD, Conservative Program, Jack-En-Y Gastric Bypass, and Vertical Sleeve Gastrectomy and the role of weight loss medications.Explained the importance of making lifestyle changes if utilizing medication to aid in weight loss  -NOT interested in surgery.  Spoke with surgeons prior  -Initial weight loss goal of 5-10% weight loss for improved health  -Screening labs and records reviewed from prior    -Patient is interested in pursuing healthycore    Discussed medication options. He is interested in starting wegovy. Side effects were discussed and discussed weight loss requirement. Also discussed stopping at least 7 days prior to procedures or surgery.    Medication contract signed    Initial Weight:322.8  Goal Weight:225

## 2024-04-17 LAB

## 2024-04-19 NOTE — PROGRESS NOTES
"Weight Management Medical Nutrition Assessment    Dudley presented for the meal planning bundle 1/6. Today's weight is 324.41.  Completed a body composition using SECA scale and reviewed results with patient. Has seen weight gain since off wegovy for about 3 months and gained about 15lb. Not interested in surgery. Spoke with surgeons prior. Is now started on a low dose of Wegovy, monitoring for reflux. Has upcoming hiatal hernia surgery. Reports he has all or nothing thinking, either does really well with structure or goes \"off the rails\". Does not get tired of eating the same thing everyday, finds meal planning is helpful for him.  Per dietary recall patient consumes excess calories from food choices, large portion sizes, and take out meals. We developed and reviewed a low calorie meal plan. Plans to start tracking calories and protein this upcoming Monday. Provided 0740-9028 calorie week at a glance handout, lean proteins, and snack handout. Will f/u in 1 month. Will complete final body composition at final 6/6 visit.    Patient seen by Medical Provider in past 6 months:  yes Zhao Umana PA-C 4/15/24  Requested to schedule appointment with Medical Provider: No      Anthropometric Measurements  Start Weight (#): 322.8 (4/15/24 MWM provider visit)  Current Weight (#): 324.41  TBW % Change from start weight:n/a  Ideal Body Weight (#): 166  Goal Weight (#): 225  Highest: 350 last year  Lowest: 300# 2008    Weight Loss History  Previous weight loss attempts: Diet and Exercise, Physician Supervised Weight Loss Program, and Prescription Weight Loss Medications     Food and Nutrition Related History  Wake up: 6-6:30am   Bed Time:11-11:30pm    Food Recall  Breakfast: 8-8:30am 4 eggs, 2 toast, sometimes sausage and coffee OR bagel  Snack: skip  Lunch: 12:30-1:30pm leftovers OR order chinese, or cheese steak   Snack: 3-3:30pm pie OR if in the house whole bag of chips   Dinner: 6-8:30pm protein, vegetable, starch (tries to " "cook every other day)  Snack: halotop 360 jace icecream     Beverages: varies water  oz (when \"doing well\" drinks more), zero jace drink, 2 coffees silk oatmilk creamer (2 tbsp per cup), reg soda when out to eat   Alcohol: sometimes 1x/wk has few oz whiskey or glass of wine     Weekends: may stop and get donuts, goes out Friday-Saturdays for dinner   Cravings: enjoys good food  Trouble area of day: between breakfast and lunch    Frequency of Eating out: 3-4 x week   Food restrictions: dislikes tofu and ground turkey, cottage cheese   Cooking: self   Food Shopping: self    Physical Activity Intake  Activity: was going 3-4 x week HIIT orange theory, has not been in the past 1.5 months   Physical limitations/barriers to exercise: n/a    Estimated Needs  Energy  SECA: BMR: 2561      X 1.3 -1000 = 2330  [place]  [place] Needs: BMR : 2357   1-2# loss weekly sedentary:  4239-2455             1-2# loss weekly lightly active: 2392-3099  Maintenance calories for sedentary activity level: 2828  Protein:       (1.2-1.5g/kg IBW)  Fluid: 88     (35mL/kg IBW)    Nutrition Diagnosis  Yes;    Overweight/obesity  related to Excess energy intake as evidenced by  BMI more than normative standard for age and sex (obesity-grade III 40+)       Nutrition Intervention    Nutrition Prescription  Calories:2388-0495, flex up to 2700 on exercise days  Protein:115-145  Fluid:88    Meal Plan (Jace/Pro/Carb)  Breakfast: 600/30  Snack: 150-200/5-15  Lunch: 600/30  Snack: 150-200/5-15  Dinner: 600/60  Snack: 150-200/5-15    Nutrition Education:    Calorie controlled menu  Lean protein food choices  Healthy snack options  Food journaling tips      Nutrition Counseling:  Strategies: meal planning, portion sizes, healthy snack choices, hydration, fiber intake, protein intake, exercise, food journal      Monitoring and Evaluation:  Evaluation criteria:  Energy Intake  Meet protein needs  Maintain adequate hydration  Monitor weekly " weight  Meal planning/preparation  Food journal   Decreased portions at mealtimes and snacks  Physical activity     Barriers to learning:none  Readiness to change: Preparation:  (Getting ready to change)   Comprehension: good  Expected Compliance: good

## 2024-04-24 ENCOUNTER — CLINICAL SUPPORT (OUTPATIENT)
Dept: BARIATRICS | Facility: CLINIC | Age: 44
End: 2024-04-24

## 2024-04-24 VITALS — WEIGHT: 315 LBS | HEIGHT: 70 IN | BODY MASS INDEX: 45.1 KG/M2

## 2024-04-24 DIAGNOSIS — R63.5 ABNORMAL WEIGHT GAIN: Primary | ICD-10-CM

## 2024-04-24 PROCEDURE — DB6PK

## 2024-04-24 PROCEDURE — RECHECK

## 2024-05-06 ENCOUNTER — APPOINTMENT (OUTPATIENT)
Dept: LAB | Facility: CLINIC | Age: 44
End: 2024-05-06
Payer: COMMERCIAL

## 2024-05-06 ENCOUNTER — OFFICE VISIT (OUTPATIENT)
Dept: FAMILY MEDICINE CLINIC | Facility: CLINIC | Age: 44
End: 2024-05-06
Payer: COMMERCIAL

## 2024-05-06 VITALS
HEART RATE: 79 BPM | HEIGHT: 71 IN | OXYGEN SATURATION: 98 % | TEMPERATURE: 96.1 F | BODY MASS INDEX: 44.1 KG/M2 | DIASTOLIC BLOOD PRESSURE: 90 MMHG | WEIGHT: 315 LBS | SYSTOLIC BLOOD PRESSURE: 138 MMHG | RESPIRATION RATE: 16 BRPM

## 2024-05-06 DIAGNOSIS — K21.9 GASTROESOPHAGEAL REFLUX DISEASE, UNSPECIFIED WHETHER ESOPHAGITIS PRESENT: ICD-10-CM

## 2024-05-06 DIAGNOSIS — Z23 ENCOUNTER FOR IMMUNIZATION: ICD-10-CM

## 2024-05-06 DIAGNOSIS — Z00.00 ANNUAL PHYSICAL EXAM: Primary | ICD-10-CM

## 2024-05-06 DIAGNOSIS — R73.03 PREDIABETES: ICD-10-CM

## 2024-05-06 DIAGNOSIS — I10 PRIMARY HYPERTENSION: ICD-10-CM

## 2024-05-06 DIAGNOSIS — K44.9 HIATAL HERNIA: ICD-10-CM

## 2024-05-06 DIAGNOSIS — L98.9 SKIN LESION: ICD-10-CM

## 2024-05-06 DIAGNOSIS — E66.01 MORBID OBESITY WITH BMI OF 45.0-49.9, ADULT (HCC): ICD-10-CM

## 2024-05-06 LAB
ALBUMIN SERPL BCP-MCNC: 4.1 G/DL (ref 3.5–5)
ALP SERPL-CCNC: 52 U/L (ref 34–104)
ALT SERPL W P-5'-P-CCNC: 14 U/L (ref 7–52)
ANION GAP SERPL CALCULATED.3IONS-SCNC: 6 MMOL/L (ref 4–13)
AST SERPL W P-5'-P-CCNC: 14 U/L (ref 13–39)
BILIRUB SERPL-MCNC: 0.77 MG/DL (ref 0.2–1)
BUN SERPL-MCNC: 15 MG/DL (ref 5–25)
CALCIUM SERPL-MCNC: 9.5 MG/DL (ref 8.4–10.2)
CHLORIDE SERPL-SCNC: 103 MMOL/L (ref 96–108)
CHOLEST SERPL-MCNC: 183 MG/DL
CO2 SERPL-SCNC: 30 MMOL/L (ref 21–32)
CREAT SERPL-MCNC: 1.36 MG/DL (ref 0.6–1.3)
ERYTHROCYTE [DISTWIDTH] IN BLOOD BY AUTOMATED COUNT: 14.6 % (ref 11.6–15.1)
EST. AVERAGE GLUCOSE BLD GHB EST-MCNC: 117 MG/DL
GFR SERPL CREATININE-BSD FRML MDRD: 62 ML/MIN/1.73SQ M
GLUCOSE P FAST SERPL-MCNC: 91 MG/DL (ref 65–99)
HBA1C MFR BLD: 5.7 %
HCT VFR BLD AUTO: 46.3 % (ref 36.5–49.3)
HDLC SERPL-MCNC: 38 MG/DL
HGB BLD-MCNC: 14.9 G/DL (ref 12–17)
LDLC SERPL CALC-MCNC: 116 MG/DL (ref 0–100)
MCH RBC QN AUTO: 27.3 PG (ref 26.8–34.3)
MCHC RBC AUTO-ENTMCNC: 32.2 G/DL (ref 31.4–37.4)
MCV RBC AUTO: 85 FL (ref 82–98)
PLATELET # BLD AUTO: 271 THOUSANDS/UL (ref 149–390)
PMV BLD AUTO: 10.3 FL (ref 8.9–12.7)
POTASSIUM SERPL-SCNC: 3.9 MMOL/L (ref 3.5–5.3)
PROT SERPL-MCNC: 7.2 G/DL (ref 6.4–8.4)
RBC # BLD AUTO: 5.46 MILLION/UL (ref 3.88–5.62)
SODIUM SERPL-SCNC: 139 MMOL/L (ref 135–147)
TRIGL SERPL-MCNC: 143 MG/DL
TSH SERPL DL<=0.05 MIU/L-ACNC: 1.2 UIU/ML (ref 0.45–4.5)
WBC # BLD AUTO: 6.61 THOUSAND/UL (ref 4.31–10.16)

## 2024-05-06 PROCEDURE — 83036 HEMOGLOBIN GLYCOSYLATED A1C: CPT

## 2024-05-06 PROCEDURE — 85027 COMPLETE CBC AUTOMATED: CPT

## 2024-05-06 PROCEDURE — 90471 IMMUNIZATION ADMIN: CPT

## 2024-05-06 PROCEDURE — 90632 HEPA VACCINE ADULT IM: CPT

## 2024-05-06 PROCEDURE — 99396 PREV VISIT EST AGE 40-64: CPT | Performed by: FAMILY MEDICINE

## 2024-05-06 PROCEDURE — 84443 ASSAY THYROID STIM HORMONE: CPT

## 2024-05-06 PROCEDURE — 80061 LIPID PANEL: CPT

## 2024-05-06 PROCEDURE — 36415 COLL VENOUS BLD VENIPUNCTURE: CPT

## 2024-05-06 PROCEDURE — 80053 COMPREHEN METABOLIC PANEL: CPT
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
## 2024-05-15 NOTE — PROGRESS NOTES
Weight Management Medical Nutrition Assessment    Dudley presented for the meal planning bundle 2/6. Today's weight is  313.1#, down 9.7# since initial. Is taking wegovy as prescribed by provider. No issues with reflux taking a lower dose. Is not tracking calories but is following meal plan, keeping an eye on protein and portion sizes. Has started to reduce saturated fat intake. Dudley has upcoming trips planned and we discussed focusing on weight maintenance while he is away. Recommended to pack protein bars as these are easy to travel with and plan for going out to eat.  Provided list of protein bars. F/u 5-6 weeks.     Patient seen by Medical Provider in past 6 months:  yes Zhao Umana PA-C 4/15/24  Requested to schedule appointment with Medical Provider: No      Anthropometric Measurements  Start Weight (#): 322.8 (4/15/24 [facility] provider visit)  Current Weight (#): 313.1  TBW % Change from start weight: 3%  Ideal Body Weight (#): 166  Goal Weight (#): 225  Highest: 350 last year  Lowest: 300# 2008    Weight Loss History  Previous weight loss attempts: Diet and Exercise, Physician Supervised Weight Loss Program, and Prescription Weight Loss Medications     Food and Nutrition Related History  Wake up: 6-6:30am   Bed Time:11-11:30pm    Food Recall  Breakfast: 8-8:30am 1 tawnya killer bread (or 2 thin slice) and 4 HB eggs, mejia on the weekends  Snack: skip   Lunch: 12:30-1:30pm  joes chicken burrito and quinoa OR leftovers (brussel sprouts, salmon, small portion pasta)  Snack: 3-3:30pm   Dinner: 6-7pm 6 oz protein, vegetable, starch (tries to cook every other day)  Snack: skip or greek yogurt     Beverages: water 80 oz, zero marie drink, 2 coffees silk oatmilk creamer (2 tbsp per cup), reg soda when out to eat   Alcohol: sometimes 1x/wk has few oz whiskey or glass of wine     Weekends: may stop and get donuts, goes out Friday-Saturdays for dinner   Cravings: enjoys good food  Trouble area of day: between breakfast  and lunch    Frequency of Eating out: 1-2x/wk    Food restrictions: dislikes tofu and ground turkey, cottage cheese   Cooking: self   Food Shopping: self    Physical Activity Intake  Activity: was going 3-4 x week HIIT orange theory, has exercised once in the past month  Physical limitations/barriers to exercise: n/a    Estimated Needs  Energy  SECA: BMR: 2561      X 1.3 -1000 = 2330  [employer] Needs: BMR : 2316   1-2# loss weekly sedentary:  2529-9040             1-2# loss weekly lightly active: 8747-9592  Maintenance calories for sedentary activity level: 2780  Protein:       (1.2-1.5g/kg IBW)  Fluid: 88     (35mL/kg IBW)    Nutrition Diagnosis  Yes;    Overweight/obesity  related to Excess energy intake as evidenced by  BMI more than normative standard for age and sex (obesity-grade III 40+)       Nutrition Intervention    Nutrition Prescription  Calories:0872-8221, flex up to 2700 on exercise days  Protein:115-145  Fluid:88    Meal Plan (Jace/Pro/Carb)  Breakfast: 600/30  Snack: 150-200/5-15  Lunch: 600/30  Snack: 150-200/5-15  Dinner: 600/60  Snack: 150-200/5-15    Nutrition Education:    Calorie controlled menu  Lean protein food choices  Healthy snack options  Food journaling tips      Nutrition Counseling:  Strategies: meal planning, portion sizes, healthy snack choices, hydration, fiber intake, protein intake, exercise, food journal      Monitoring and Evaluation:  Evaluation criteria:  Energy Intake  Meet protein needs  Maintain adequate hydration  Monitor weekly weight  Meal planning/preparation  Food journal   Decreased portions at mealtimes and snacks  Physical activity     Barriers to learning:none  Readiness to change: Action:  (Changing behavior)  Comprehension: good  Expected Compliance: good

## 2024-05-22 ENCOUNTER — CLINICAL SUPPORT (OUTPATIENT)
Dept: BARIATRICS | Facility: CLINIC | Age: 44
End: 2024-05-22

## 2024-05-22 VITALS — HEIGHT: 70 IN | WEIGHT: 313.1 LBS | BODY MASS INDEX: 44.82 KG/M2

## 2024-05-22 DIAGNOSIS — R63.5 ABNORMAL WEIGHT GAIN: Primary | ICD-10-CM

## 2024-05-22 DIAGNOSIS — I10 ESSENTIAL HYPERTENSION: ICD-10-CM

## 2024-05-22 PROCEDURE — RECHECK

## 2024-05-22 RX ORDER — AMLODIPINE BESYLATE 5 MG/1
5 TABLET ORAL DAILY
Qty: 90 TABLET | Refills: 1 | Status: SHIPPED | OUTPATIENT
Start: 2024-05-22

## 2024-06-15 DIAGNOSIS — E66.01 MORBID OBESITY WITH BMI OF 45.0-49.9, ADULT (HCC): ICD-10-CM

## 2024-06-17 RX ORDER — SEMAGLUTIDE 0.5 MG/.5ML
0.5 INJECTION, SOLUTION SUBCUTANEOUS WEEKLY
Qty: 6 ML | Refills: 1 | Status: SHIPPED | OUTPATIENT
Start: 2024-06-17 | End: 2024-09-15

## 2024-06-17 NOTE — TELEPHONE ENCOUNTER
Patient would like to stay on current dose and sent to [facility] pharmacy in [place].

## 2024-07-01 ENCOUNTER — TELEPHONE (OUTPATIENT)
Age: 44
End: 2024-07-01

## 2024-08-05 ENCOUNTER — TELEPHONE (OUTPATIENT)
Dept: GASTROENTEROLOGY | Facility: AMBULARY SURGERY CENTER | Age: 44
End: 2024-08-05

## 2024-08-12 ENCOUNTER — ANESTHESIA EVENT (OUTPATIENT)
Dept: GASTROENTEROLOGY | Facility: HOSPITAL | Age: 44
End: 2024-08-12

## 2024-08-12 ENCOUNTER — ANESTHESIA (OUTPATIENT)
Dept: GASTROENTEROLOGY | Facility: HOSPITAL | Age: 44
End: 2024-08-12

## 2024-08-12 ENCOUNTER — HOSPITAL ENCOUNTER (OUTPATIENT)
Dept: GASTROENTEROLOGY | Facility: HOSPITAL | Age: 44
Setting detail: OUTPATIENT SURGERY
Discharge: HOME | End: 2024-08-12
Attending: INTERNAL MEDICINE
Payer: COMMERCIAL

## 2024-08-12 VITALS
TEMPERATURE: 98.6 F | HEART RATE: 80 BPM | HEIGHT: 71 IN | WEIGHT: 310 LBS | SYSTOLIC BLOOD PRESSURE: 144 MMHG | DIASTOLIC BLOOD PRESSURE: 88 MMHG | BODY MASS INDEX: 43.4 KG/M2 | OXYGEN SATURATION: 99 % | RESPIRATION RATE: 18 BRPM

## 2024-08-12 DIAGNOSIS — K21.9 GASTROESOPHAGEAL REFLUX DISEASE, UNSPECIFIED WHETHER ESOPHAGITIS PRESENT: ICD-10-CM

## 2024-08-12 DIAGNOSIS — K44.9 HIATAL HERNIA: ICD-10-CM

## 2024-08-12 DIAGNOSIS — Z12.11 SCREENING FOR COLON CANCER: ICD-10-CM

## 2024-08-12 PROCEDURE — 88305 TISSUE EXAM BY PATHOLOGIST: CPT | Performed by: PATHOLOGY

## 2024-08-12 PROCEDURE — 43239 EGD BIOPSY SINGLE/MULTIPLE: CPT | Performed by: INTERNAL MEDICINE

## 2024-08-12 PROCEDURE — 45385 COLONOSCOPY W/LESION REMOVAL: CPT | Performed by: INTERNAL MEDICINE

## 2024-08-12 PROCEDURE — 45380 COLONOSCOPY AND BIOPSY: CPT | Performed by: INTERNAL MEDICINE

## 2024-08-12 RX ORDER — PROPOFOL 10 MG/ML
INJECTION, EMULSION INTRAVENOUS AS NEEDED
Status: DISCONTINUED | OUTPATIENT
Start: 2024-08-12 | End: 2024-08-12

## 2024-08-12 RX ORDER — LIDOCAINE HYDROCHLORIDE 20 MG/ML
INJECTION, SOLUTION EPIDURAL; INFILTRATION; INTRACAUDAL; PERINEURAL AS NEEDED
Status: DISCONTINUED | OUTPATIENT
Start: 2024-08-12 | End: 2024-08-12

## 2024-08-12 RX ORDER — GLYCOPYRROLATE 0.2 MG/ML
INJECTION INTRAMUSCULAR; INTRAVENOUS AS NEEDED
Status: DISCONTINUED | OUTPATIENT
Start: 2024-08-12 | End: 2024-08-12

## 2024-08-12 RX ORDER — SODIUM CHLORIDE, SODIUM LACTATE, POTASSIUM CHLORIDE, CALCIUM CHLORIDE 600; 310; 30; 20 MG/100ML; MG/100ML; MG/100ML; MG/100ML
INJECTION, SOLUTION INTRAVENOUS CONTINUOUS PRN
Status: DISCONTINUED | OUTPATIENT
Start: 2024-08-12 | End: 2024-08-12

## 2024-08-12 RX ORDER — FENTANYL CITRATE 50 UG/ML
INJECTION, SOLUTION INTRAMUSCULAR; INTRAVENOUS AS NEEDED
Status: DISCONTINUED | OUTPATIENT
Start: 2024-08-12 | End: 2024-08-12

## 2024-08-12 RX ADMIN — PROPOFOL 50 MG: 10 INJECTION, EMULSION INTRAVENOUS at 07:53

## 2024-08-12 RX ADMIN — PROPOFOL 150 MG: 10 INJECTION, EMULSION INTRAVENOUS at 07:52

## 2024-08-12 RX ADMIN — GLYCOPYRROLATE 0.2 MG: 0.2 INJECTION INTRAMUSCULAR; INTRAVENOUS at 07:48

## 2024-08-12 RX ADMIN — PROPOFOL 30 MG: 10 INJECTION, EMULSION INTRAVENOUS at 08:14

## 2024-08-12 RX ADMIN — SODIUM CHLORIDE, SODIUM LACTATE, POTASSIUM CHLORIDE, AND CALCIUM CHLORIDE: .6; .31; .03; .02 INJECTION, SOLUTION INTRAVENOUS at 07:15

## 2024-08-12 RX ADMIN — PROPOFOL 10 MG: 10 INJECTION, EMULSION INTRAVENOUS at 08:18

## 2024-08-12 RX ADMIN — FENTANYL CITRATE 50 MCG: 50 INJECTION INTRAMUSCULAR; INTRAVENOUS at 07:48

## 2024-08-12 RX ADMIN — LIDOCAINE HYDROCHLORIDE 100 MG: 20 INJECTION, SOLUTION EPIDURAL; INFILTRATION; INTRACAUDAL at 07:52

## 2024-08-12 RX ADMIN — PROPOFOL 50 MG: 10 INJECTION, EMULSION INTRAVENOUS at 07:58

## 2024-08-12 RX ADMIN — FENTANYL CITRATE 50 MCG: 50 INJECTION INTRAMUSCULAR; INTRAVENOUS at 07:59

## 2024-08-12 RX ADMIN — PROPOFOL 50 MG: 10 INJECTION, EMULSION INTRAVENOUS at 08:04

## 2024-08-12 RX ADMIN — PROPOFOL 30 MG: 10 INJECTION, EMULSION INTRAVENOUS at 07:55

## 2024-08-12 RX ADMIN — PROPOFOL 30 MG: 10 INJECTION, EMULSION INTRAVENOUS at 08:10

## 2024-08-12 NOTE — ANESTHESIA PREPROCEDURE EVALUATION
Procedure:  COLONOSCOPY  EGD    Relevant Problems   ANESTHESIA (within normal limits)      CARDIO   (+) Hypertension      GI/HEPATIC   (+) GERD (gastroesophageal reflux disease)   (+) Hiatal hernia      MUSCULOSKELETAL   (+) Hiatal hernia      PULMONARY   (+) Obstructive sleep apnea      Other   (+) Class 3 severe obesity in adult (HCC)   (+) Prediabetes        Physical Exam    Airway    Mallampati score: III  TM Distance: >3 FB  Neck ROM: full     Dental   No notable dental hx     Cardiovascular      Pulmonary      Other Findings        Anesthesia Plan  ASA Score- 3     Anesthesia Type- IV sedation with anesthesia with ASA Monitors.         Additional Monitors:     Airway Plan:     Comment: Off Semaglutide >1 week.       Plan Factors-Exercise tolerance (METS): >4 METS.    Chart reviewed.   Existing labs reviewed. Patient summary reviewed.    Patient is a current smoker (Rare cigar and vape).  Patient did not smoke on day of surgery.            Induction-     Postoperative Plan-         Informed Consent- Anesthetic plan and risks discussed with patient.  I personally reviewed this patient with the CRNA. Discussed and agreed on the Anesthesia Plan with the CRNA..

## 2024-08-12 NOTE — ANESTHESIA POSTPROCEDURE EVALUATION
Post-Op Assessment Note    CV Status:  Stable  Pain Score: 0    Pain management: adequate       Mental Status:  Awake and alert   Hydration Status:  Stable   PONV Controlled:  None   Airway Patency:  Patent and adequate     Post Op Vitals Reviewed: Yes    No anethesia notable event occurred.    Staff: CRNA, Anesthesiologist               BP   124/65   Temp      Pulse  72   Resp   16   SpO2   100
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
## 2024-08-24 DIAGNOSIS — K21.9 GERD WITHOUT ESOPHAGITIS: ICD-10-CM

## 2024-08-24 DIAGNOSIS — I10 ESSENTIAL HYPERTENSION: ICD-10-CM

## 2024-08-25 RX ORDER — VALSARTAN AND HYDROCHLOROTHIAZIDE 320; 25 MG/1; MG/1
1 TABLET, FILM COATED ORAL DAILY
Qty: 90 TABLET | Refills: 1 | Status: SHIPPED | OUTPATIENT
Start: 2024-08-25

## 2024-08-25 RX ORDER — OMEPRAZOLE 40 MG/1
CAPSULE, DELAYED RELEASE ORAL
Qty: 180 CAPSULE | Refills: 1 | Status: SHIPPED | OUTPATIENT
Start: 2024-08-25

## 2024-10-02 ENCOUNTER — OFFICE VISIT (OUTPATIENT)
Dept: GASTROENTEROLOGY | Facility: AMBULARY SURGERY CENTER | Age: 44
End: 2024-10-02
Payer: COMMERCIAL

## 2024-10-02 VITALS
DIASTOLIC BLOOD PRESSURE: 86 MMHG | WEIGHT: 315 LBS | HEART RATE: 73 BPM | BODY MASS INDEX: 44.1 KG/M2 | SYSTOLIC BLOOD PRESSURE: 142 MMHG | HEIGHT: 71 IN | OXYGEN SATURATION: 98 %

## 2024-10-02 DIAGNOSIS — K21.00 GASTROESOPHAGEAL REFLUX DISEASE WITH ESOPHAGITIS WITHOUT HEMORRHAGE: Primary | ICD-10-CM

## 2024-10-02 DIAGNOSIS — E66.01 CLASS 3 SEVERE OBESITY DUE TO EXCESS CALORIES WITHOUT SERIOUS COMORBIDITY WITH BODY MASS INDEX (BMI) OF 45.0 TO 49.9 IN ADULT (HCC): ICD-10-CM

## 2024-10-02 DIAGNOSIS — E66.813 CLASS 3 SEVERE OBESITY DUE TO EXCESS CALORIES WITHOUT SERIOUS COMORBIDITY WITH BODY MASS INDEX (BMI) OF 45.0 TO 49.9 IN ADULT (HCC): ICD-10-CM

## 2024-10-02 DIAGNOSIS — K44.9 HIATAL HERNIA: ICD-10-CM

## 2024-10-02 PROCEDURE — 99214 OFFICE O/P EST MOD 30 MIN: CPT | Performed by: INTERNAL MEDICINE

## 2024-10-02 RX ORDER — FAMOTIDINE 20 MG/1
20 TABLET, FILM COATED ORAL 2 TIMES DAILY PRN
Qty: 180 TABLET | Refills: 3 | Status: SHIPPED | OUTPATIENT
Start: 2024-10-02

## 2024-10-02 NOTE — ASSESSMENT & PLAN NOTE
Orders:    famotidine (PEPCID) 20 mg tablet; Take 1 tablet (20 mg total) by mouth 2 (two) times a day as needed for heartburn

## 2024-10-02 NOTE — ASSESSMENT & PLAN NOTE
Chronic symptoms > 12 months, refractory to medical therapy  Continues BID Prilosec- continues to have acid reflux symptoms that affect QOL  Objectively healing noted on EGD August 2024  + pH study  Emphasized weight loss and surgical options to treat symptoms -Recommend f/u with bariatric surgery, advanced endoscopist ?TIF and weight loss management- also considering GLP-1 agonist  Orders:    famotidine (PEPCID) 20 mg tablet; Take 1 tablet (20 mg total) by mouth 2 (two) times a day as needed for heartburn

## 2024-10-02 NOTE — ASSESSMENT & PLAN NOTE
He is following with bariatric surgery, defers bariatric surgery at this time- but discussing bariatric surgery + TIF procedure as alternative  Consideration of caloric restriction diets, GLP-1 agonist

## 2024-10-02 NOTE — PROGRESS NOTES
Ambulatory Visit  Name: Dudley Madsen      : 1980      MRN: [id number]  Encounter Provider: Rand Tse MD  Encounter Date: 10/2/2024   Encounter department: [facility]    Assessment & Plan  Gastroesophageal reflux disease with esophagitis without hemorrhage  Chronic symptoms > 12 months, refractory to medical therapy  Continues BID Prilosec- continues to have acid reflux symptoms that affect QOL  Objectively healing noted on EGD 2024  + pH study  Emphasized weight loss and surgical options to treat symptoms -Recommend f/u with bariatric surgery, advanced endoscopist ?TIF and weight loss management- also considering GLP-1 agonist  Orders:    famotidine (PEPCID) 20 mg tablet; Take 1 tablet (20 mg total) by mouth 2 (two) times a day as needed for heartburn    Hiatal hernia    Orders:    famotidine (PEPCID) 20 mg tablet; Take 1 tablet (20 mg total) by mouth 2 (two) times a day as needed for heartburn    Class 3 severe obesity due to excess calories without serious comorbidity with body mass index (BMI) of 45.0 to 49.9 in adult (HCC)  He is following with bariatric surgery, defers bariatric surgery at this time- but discussing bariatric surgery + TIF procedure as alternative  Consideration of caloric restriction diets, GLP-1 agonist             History of Present Illness     Dudley Madsen is a 44 y.o. male who presents in office visit follow-up for GERD. Chronic symptoms > 12 months, continues to have symptoms of acid reflux that affect his quality of life.     Abdominal Pain  This is a chronic problem. The current episode started more than 1 year ago. The onset quality is undetermined. The problem occurs 2 to 4 times per day. The most recent episode lasted 1 hours. The problem has been unchanged. The pain is located in the RUQ. The pain is at a severity of 5/10. The quality of the pain is dull. The abdominal pain radiates to the chest. Pertinent negatives  "include no anorexia, arthralgias, belching, constipation, diarrhea, dysuria, fever, flatus, frequency, headaches, hematochezia, hematuria, melena, myalgias, nausea, vomiting or weight loss. Nothing aggravates the pain. The pain is relieved by Nothing. Prior diagnostic workup includes lower endoscopy.       EGD August 2024-GE junction biopsies with chronic inflammation  Colonoscopy August 2024-subcentimeter cecal tubular adenoma x 2, tubulovillous adenoma of the ascending colon, recall 3 years        Review of Systems   Constitutional:  Negative for chills, fever, unexpected weight change and weight loss.   HENT:  Negative for ear pain and sore throat.    Eyes:  Negative for pain and visual disturbance.   Respiratory:  Negative for cough and shortness of breath.    Cardiovascular:  Negative for chest pain and palpitations.   Gastrointestinal:  Positive for abdominal pain. Negative for anorexia, blood in stool, constipation, diarrhea, flatus, hematochezia, melena, nausea and vomiting.   Genitourinary:  Negative for dysuria, frequency and hematuria.   Musculoskeletal:  Negative for arthralgias, back pain and myalgias.   Skin:  Negative for color change and rash.   Neurological:  Negative for seizures, syncope and headaches.   All other systems reviewed and are negative.          Objective     /86 (BP Location: Left arm, Patient Position: Sitting, Cuff Size: Large)   Pulse 73   Ht 5' 11\" (1.803 m)   Wt (!) 146 kg (322 lb 3.2 oz)   SpO2 98%   BMI 44.94 kg/m²     Physical Exam  Vitals and nursing note reviewed.   Constitutional:       General: He is not in acute distress.  HENT:      Head: Normocephalic and atraumatic.   Eyes:      Conjunctiva/sclera: Conjunctivae normal.   Cardiovascular:      Rate and Rhythm: Normal rate and regular rhythm.      Heart sounds: No murmur heard.  Pulmonary:      Effort: Pulmonary effort is normal. No respiratory distress.      Breath sounds: Normal breath sounds.   Abdominal:    "   Palpations: Abdomen is soft.      Tenderness: There is no abdominal tenderness.   Musculoskeletal:         General: No swelling.      Cervical back: Neck supple.   Skin:     General: Skin is warm and dry.      Capillary Refill: Capillary refill takes less than 2 seconds.   Neurological:      Mental Status: He is alert.   Psychiatric:         Mood and Affect: Mood normal.

## 2024-11-06 ENCOUNTER — OFFICE VISIT (OUTPATIENT)
Dept: FAMILY MEDICINE CLINIC | Facility: CLINIC | Age: 44
End: 2024-11-06
Payer: COMMERCIAL

## 2024-11-06 ENCOUNTER — TELEPHONE (OUTPATIENT)
Age: 44
End: 2024-11-06

## 2024-11-06 VITALS
OXYGEN SATURATION: 99 % | HEART RATE: 64 BPM | DIASTOLIC BLOOD PRESSURE: 88 MMHG | WEIGHT: 315 LBS | BODY MASS INDEX: 44.1 KG/M2 | TEMPERATURE: 96.9 F | SYSTOLIC BLOOD PRESSURE: 126 MMHG | HEIGHT: 71 IN | RESPIRATION RATE: 18 BRPM

## 2024-11-06 DIAGNOSIS — I10 PRIMARY HYPERTENSION: Primary | ICD-10-CM

## 2024-11-06 DIAGNOSIS — E66.813 CLASS 3 SEVERE OBESITY DUE TO EXCESS CALORIES WITHOUT SERIOUS COMORBIDITY WITH BODY MASS INDEX (BMI) OF 45.0 TO 49.9 IN ADULT (HCC): ICD-10-CM

## 2024-11-06 DIAGNOSIS — E66.01 CLASS 3 SEVERE OBESITY DUE TO EXCESS CALORIES WITHOUT SERIOUS COMORBIDITY WITH BODY MASS INDEX (BMI) OF 45.0 TO 49.9 IN ADULT (HCC): ICD-10-CM

## 2024-11-06 DIAGNOSIS — Z23 ENCOUNTER FOR IMMUNIZATION: ICD-10-CM

## 2024-11-06 DIAGNOSIS — R73.03 PREDIABETES: ICD-10-CM

## 2024-11-06 PROCEDURE — 90656 IIV3 VACC NO PRSV 0.5 ML IM: CPT

## 2024-11-06 PROCEDURE — 90471 IMMUNIZATION ADMIN: CPT

## 2024-11-06 PROCEDURE — 99214 OFFICE O/P EST MOD 30 MIN: CPT | Performed by: FAMILY MEDICINE

## 2024-11-06 NOTE — ASSESSMENT & PLAN NOTE
Orders:    Semaglutide-Weight Management (WEGOVY) 0.25 MG/0.5ML; Inject 0.5 mL (0.25 mg total) under the skin once a week for 28 days    Semaglutide-Weight Management (WEGOVY) 0.5 MG/0.5ML; Inject 0.5 mL (0.5 mg total) under the skin once a week for 28 days Do not start before December 2, 2024.    Semaglutide-Weight Management (WEGOVY) 1 MG/0.5ML; Inject 0.5 mL (1 mg total) under the skin once a week for 28 days Do not start before December 30, 2024.    Semaglutide-Weight Management (WEGOVY) 1.7 MG/0.75ML; Inject 0.75 mL (1.7 mg total) under the skin once a week for 28 days Do not start before January 27, 2025.    Semaglutide-Weight Management (WEGOVY) 2.4 MG/0.75ML; Inject 0.75 mL (2.4 mg total) under the skin once a week for 28 days Do not start before February 24, 2025.    CBC; Future    Comprehensive metabolic panel; Future    Lipid Panel with Direct LDL reflex; Future    TSH, 3rd generation with Free T4 reflex; Future

## 2024-11-06 NOTE — ASSESSMENT & PLAN NOTE
Prior Authorization Clinical Questions for Weight Management Pharmacotherapy    1. Does the patient have a contrainidcation to medication prescribed for weight management?: No  2. Does the patient have a diagnosis of obesity, confirmed by a BMI greater than or equal to 30 kg/m^2?: Yes  3. Does the patient have a BMI of greater than or equal to 27 kg/m^2 with at least one weight-related comorbidity/risk factor/complication (e.g. diabetes, dyslipidemia, coronary artery disease)?: Yes  4. Weight-related co-morbidities/risk factors: prediabetes, dyslipidemia, hypertension, obstructive sleep apnea (AMBROSE), GERD, osteoarthritis  5. Has the patient been on a weight loss regimen of low-calorie diet, increased physical activity, and lifestyle modifications for a minimum of 6 months?: Yes  6. Has the patient completed a comprehensive weight loss program (ie, Weight Watchers, Noom, Bariatrics, other [place] on phone)? If so, what?: Yes   -- Q6 Further Explanation: weight watcher   7. Does the patient have a history of type 2 diabetes?: No  8. Has the member tried and failed other weight loss medication within the past 12 months?: No  9. Will the member use requested medication in combination with another GLP agonist or weight loss drug?: No  10. Is the medication a controlled substance?: No     Baseline weight (in pounds): 324 lbs         Orders:    Semaglutide-Weight Management (WEGOVY) 0.25 MG/0.5ML; Inject 0.5 mL (0.25 mg total) under the skin once a week for 28 days    Semaglutide-Weight Management (WEGOVY) 0.5 MG/0.5ML; Inject 0.5 mL (0.5 mg total) under the skin once a week for 28 days Do not start before December 2, 2024.    Semaglutide-Weight Management (WEGOVY) 1 MG/0.5ML; Inject 0.5 mL (1 mg total) under the skin once a week for 28 days Do not start before December 30, 2024.    Semaglutide-Weight Management (WEGOVY) 1.7 MG/0.75ML; Inject 0.75 mL (1.7 mg total) under the skin once a week for 28 days Do not start before  January 27, 2025.    Semaglutide-Weight Management (WEGOVY) 2.4 MG/0.75ML; Inject 0.75 mL (2.4 mg total) under the skin once a week for 28 days Do not start before February 24, 2025.

## 2024-11-06 NOTE — TELEPHONE ENCOUNTER
PA for WEGOVY 0.25MG- SUBMITTED     via      [x][employer]-Case ID #       All office notes, labs and other pertaining documents and studies sent. Clinical questions answered. Awaiting determination from insurance company.     All Prior Authorizations are sent as Urgent to all insurance companies, however Prior Authorizations can take anywhere from 7-21 days for a response from your insurance company. You can gladly reach out to your insurance company to help expedite this process.

## 2024-11-06 NOTE — ASSESSMENT & PLAN NOTE
Orders:    Semaglutide-Weight Management (WEGOVY) 0.25 MG/0.5ML; Inject 0.5 mL (0.25 mg total) under the skin once a week for 28 days    Semaglutide-Weight Management (WEGOVY) 0.5 MG/0.5ML; Inject 0.5 mL (0.5 mg total) under the skin once a week for 28 days Do not start before December 2, 2024.    Semaglutide-Weight Management (WEGOVY) 1 MG/0.5ML; Inject 0.5 mL (1 mg total) under the skin once a week for 28 days Do not start before December 30, 2024.    Semaglutide-Weight Management (WEGOVY) 1.7 MG/0.75ML; Inject 0.75 mL (1.7 mg total) under the skin once a week for 28 days Do not start before January 27, 2025.    Semaglutide-Weight Management (WEGOVY) 2.4 MG/0.75ML; Inject 0.75 mL (2.4 mg total) under the skin once a week for 28 days Do not start before February 24, 2025.    Hemoglobin A1C; Future

## 2024-11-06 NOTE — PROGRESS NOTES
Ambulatory Visit  Name: Dudley Madsen      : 1980      MRN: [id number]  Encounter Provider: Amarilis Machado DO  Encounter Date: 2024   Encounter department: [facility]    Assessment & Plan  Primary hypertension    Orders:    Semaglutide-Weight Management (WEGOVY) 0.25 MG/0.5ML; Inject 0.5 mL (0.25 mg total) under the skin once a week for 28 days    Semaglutide-Weight Management (WEGOVY) 0.5 MG/0.5ML; Inject 0.5 mL (0.5 mg total) under the skin once a week for 28 days Do not start before 2024.    Semaglutide-Weight Management (WEGOVY) 1 MG/0.5ML; Inject 0.5 mL (1 mg total) under the skin once a week for 28 days Do not start before 2024.    Semaglutide-Weight Management (WEGOVY) 1.7 MG/0.75ML; Inject 0.75 mL (1.7 mg total) under the skin once a week for 28 days Do not start before 2025.    Semaglutide-Weight Management (WEGOVY) 2.4 MG/0.75ML; Inject 0.75 mL (2.4 mg total) under the skin once a week for 28 days Do not start before 2025.    CBC; Future    Comprehensive metabolic panel; Future    Lipid Panel with Direct LDL reflex; Future    TSH, 3rd generation with Free T4 reflex; Future    Prediabetes    Orders:    Semaglutide-Weight Management (WEGOVY) 0.25 MG/0.5ML; Inject 0.5 mL (0.25 mg total) under the skin once a week for 28 days    Semaglutide-Weight Management (WEGOVY) 0.5 MG/0.5ML; Inject 0.5 mL (0.5 mg total) under the skin once a week for 28 days Do not start before 2024.    Semaglutide-Weight Management (WEGOVY) 1 MG/0.5ML; Inject 0.5 mL (1 mg total) under the skin once a week for 28 days Do not start before 2024.    Semaglutide-Weight Management (WEGOVY) 1.7 MG/0.75ML; Inject 0.75 mL (1.7 mg total) under the skin once a week for 28 days Do not start before 2025.    Semaglutide-Weight Management (WEGOVY) 2.4 MG/0.75ML; Inject 0.75 mL (2.4 mg total) under the skin once a week for 28 days  Do not start before February 24, 2025.    Hemoglobin A1C; Future    Class 3 severe obesity due to excess calories without serious comorbidity with body mass index (BMI) of 45.0 to 49.9 in adult (HCC)  Prior Authorization Clinical Questions for Weight Management Pharmacotherapy    1. Does the patient have a contrainidcation to medication prescribed for weight management?: No  2. Does the patient have a diagnosis of obesity, confirmed by a BMI greater than or equal to 30 kg/m^2?: Yes  3. Does the patient have a BMI of greater than or equal to 27 kg/m^2 with at least one weight-related comorbidity/risk factor/complication (e.g. diabetes, dyslipidemia, coronary artery disease)?: Yes  4. Weight-related co-morbidities/risk factors: prediabetes, dyslipidemia, hypertension, obstructive sleep apnea (AMBROSE), GERD, osteoarthritis  5. Has the patient been on a weight loss regimen of low-calorie diet, increased physical activity, and lifestyle modifications for a minimum of 6 months?: Yes  6. Has the patient completed a comprehensive weight loss program (ie, Weight Watchers, Noom, Bariatrics, other [place] on phone)? If so, what?: Yes   -- Q6 Further Explanation: weight watcher   7. Does the patient have a history of type 2 diabetes?: No  8. Has the member tried and failed other weight loss medication within the past 12 months?: No  9. Will the member use requested medication in combination with another GLP agonist or weight loss drug?: No  10. Is the medication a controlled substance?: No     Baseline weight (in pounds): 324 lbs         Orders:    Semaglutide-Weight Management (WEGOVY) 0.25 MG/0.5ML; Inject 0.5 mL (0.25 mg total) under the skin once a week for 28 days    Semaglutide-Weight Management (WEGOVY) 0.5 MG/0.5ML; Inject 0.5 mL (0.5 mg total) under the skin once a week for 28 days Do not start before December 2, 2024.    Semaglutide-Weight Management (WEGOVY) 1 MG/0.5ML; Inject 0.5 mL (1 mg total) under the skin once a week  "for 28 days Do not start before December 30, 2024.    Semaglutide-Weight Management (WEGOVY) 1.7 MG/0.75ML; Inject 0.75 mL (1.7 mg total) under the skin once a week for 28 days Do not start before January 27, 2025.    Semaglutide-Weight Management (WEGOVY) 2.4 MG/0.75ML; Inject 0.75 mL (2.4 mg total) under the skin once a week for 28 days Do not start before February 24, 2025.    Encounter for immunization    Orders:    influenza vaccine preservative-free 0.5 mL IM (Fluzone, Afluria, Fluarix, Flulaval)      Depression Screening and Follow-up Plan: Patient was screened for depression during today's encounter. They screened negative with a PHQ-2 score of 0.        History of Present Illness     History of Present Illness  Here for follow up  Had endoscopy/colonscopy  Seen by GI  Fixing the hiatal hernia  Has been on glp for side effects  Went off due to procedure         Review of Systems   Constitutional: Negative.    HENT: Negative.     Eyes: Negative.    Respiratory: Negative.     Cardiovascular: Negative.    Gastrointestinal:  Positive for abdominal pain.        Gerd   Endocrine: Negative.    Genitourinary: Negative.    Musculoskeletal: Negative.    Skin: Negative.    Allergic/Immunologic: Negative.    Neurological: Negative.    Hematological: Negative.    Psychiatric/Behavioral: Negative.       Objective     /88 (BP Location: Left arm, Patient Position: Sitting, Cuff Size: Large)   Pulse 64   Temp (!) 96.9 °F (36.1 °C) (Tympanic)   Resp 18   Ht 5' 11\" (1.803 m)   Wt (!) 147 kg (324 lb)   SpO2 99%   BMI 45.19 kg/m²     Physical Exam    Physical Exam  Vitals and nursing note reviewed.   Constitutional:       Appearance: Normal appearance. He is well-developed.   HENT:      Head: Normocephalic and atraumatic.      Right Ear: External ear normal.      Left Ear: External ear normal.      Nose: Nose normal.   Eyes:      Extraocular Movements: Extraocular movements intact.      Conjunctiva/sclera: " Conjunctivae normal.      Pupils: Pupils are equal, round, and reactive to light.   Cardiovascular:      Rate and Rhythm: Normal rate and regular rhythm.      Heart sounds: Normal heart sounds.   Pulmonary:      Effort: Pulmonary effort is normal.      Breath sounds: Normal breath sounds.   Abdominal:      General: Bowel sounds are normal.      Palpations: Abdomen is soft.   Musculoskeletal:         General: Normal range of motion.      Cervical back: Normal range of motion and neck supple.   Skin:     General: Skin is warm and dry.      Capillary Refill: Capillary refill takes less than 2 seconds.   Neurological:      General: No focal deficit present.      Mental Status: He is alert and oriented to person, place, and time.   Psychiatric:         Mood and Affect: Mood normal.         Behavior: Behavior normal.         Thought Content: Thought content normal.         Judgment: Judgment normal.

## 2024-11-06 NOTE — TELEPHONE ENCOUNTER
PA for WEGOVY 0.25MG- APPROVED     Date(s) approved November 6, 2024 to June 6, 2025     Patient advised by          [x][id number] Message  []Phone call   [x]LMOM  []L/M to call office as no active Communication consent on file  []Unable to leave detailed message as VM not approved on Communication consent       Pharmacy advised by    [x]Fax  []Phone call

## 2024-11-18 DIAGNOSIS — Z00.6 ENCOUNTER FOR EXAMINATION FOR NORMAL COMPARISON OR CONTROL IN CLINICAL RESEARCH PROGRAM: ICD-10-CM

## 2025-02-05 ENCOUNTER — OFFICE VISIT (OUTPATIENT)
Dept: FAMILY MEDICINE CLINIC | Facility: CLINIC | Age: 45
End: 2025-02-05
Payer: COMMERCIAL

## 2025-02-05 VITALS
BODY MASS INDEX: 43.82 KG/M2 | RESPIRATION RATE: 18 BRPM | SYSTOLIC BLOOD PRESSURE: 130 MMHG | HEART RATE: 75 BPM | WEIGHT: 313 LBS | HEIGHT: 71 IN | OXYGEN SATURATION: 100 % | DIASTOLIC BLOOD PRESSURE: 90 MMHG | TEMPERATURE: 96.5 F

## 2025-02-05 DIAGNOSIS — R73.03 PREDIABETES: ICD-10-CM

## 2025-02-05 DIAGNOSIS — I10 PRIMARY HYPERTENSION: ICD-10-CM

## 2025-02-05 DIAGNOSIS — M79.671 PAIN IN BOTH FEET: Primary | ICD-10-CM

## 2025-02-05 DIAGNOSIS — L81.9 DISCOLORATION OF SKIN OF TOE: ICD-10-CM

## 2025-02-05 DIAGNOSIS — E66.813 CLASS 3 SEVERE OBESITY DUE TO EXCESS CALORIES WITHOUT SERIOUS COMORBIDITY WITH BODY MASS INDEX (BMI) OF 45.0 TO 49.9 IN ADULT (HCC): ICD-10-CM

## 2025-02-05 DIAGNOSIS — M79.672 PAIN IN BOTH FEET: Primary | ICD-10-CM

## 2025-02-05 DIAGNOSIS — E66.01 CLASS 3 SEVERE OBESITY DUE TO EXCESS CALORIES WITHOUT SERIOUS COMORBIDITY WITH BODY MASS INDEX (BMI) OF 45.0 TO 49.9 IN ADULT (HCC): ICD-10-CM

## 2025-02-05 PROCEDURE — 99214 OFFICE O/P EST MOD 30 MIN: CPT | Performed by: FAMILY MEDICINE

## 2025-02-05 NOTE — ASSESSMENT & PLAN NOTE
Most rayne raynauds  Will keep feet warm  Orders:  •  VAS ANITHA and waveform analysis, multiple levels; Future

## 2025-02-05 NOTE — PROGRESS NOTES
"Name: Dudley Madsen      : 1980      MRN: [id number]  Encounter Provider: Amarilis Machado DO  Encounter Date: 2025   Encounter department: [facility]    Assessment & Plan  Pain in both feet  Most likley raynauds  Will keep feet warm  Orders:  •  VAS ANITHA and waveform analysis, multiple levels; Future    Discoloration of skin of toe    Orders:  •  VAS ANITHA and waveform analysis, multiple levels; Future    Primary hypertension  Mild  improvement       Class 3 severe obesity due to excess calories without serious comorbidity with body mass index (BMI) of 45.0 to 49.9 in adult (HCC)    Doing well on wegovy       Prediabetes  On wegovy         Assessment & Plan      Depression Screening and Follow-up Plan: Patient was screened for depression during today's encounter. They screened negative with a PHQ-2 score of 0.        History of Present Illness     History of Present Illness  For the past 2 months and last year int he winter  Feet get cold  Toes get discolored  Switched to wool soap     Review of Systems   Constitutional: Negative.    HENT: Negative.     Eyes: Negative.    Respiratory: Negative.     Cardiovascular: Negative.    Gastrointestinal: Negative.    Endocrine: Negative.    Genitourinary: Negative.    Skin:         Feet discoloration and discomfort   Allergic/Immunologic: Negative.    Neurological: Negative.    Hematological: Negative.    Psychiatric/Behavioral: Negative.       Objective   /90 (BP Location: Left arm, Patient Position: Sitting, Cuff Size: Large)   Pulse 75   Temp (!) 96.5 °F (35.8 °C) (Tympanic)   Resp 18   Ht 5' 11\" (1.803 m)   Wt (!) 142 kg (313 lb)   SpO2 100%   BMI 43.65 kg/m²     Physical Exam    Physical Exam  Vitals and nursing note reviewed.   Constitutional:       Appearance: He is well-developed.   HENT:      Head: Normocephalic and atraumatic.      Right Ear: External ear normal.      Left Ear: External ear normal.      Nose: Nose normal. "   Eyes:      Conjunctiva/sclera: Conjunctivae normal.      Pupils: Pupils are equal, round, and reactive to light.   Cardiovascular:      Rate and Rhythm: Normal rate and regular rhythm.      Heart sounds: Normal heart sounds.   Pulmonary:      Effort: Pulmonary effort is normal.      Breath sounds: Normal breath sounds.   Abdominal:      General: Bowel sounds are normal.      Palpations: Abdomen is soft.   Musculoskeletal:         General: Normal range of motion.      Cervical back: Normal range of motion and neck supple.   Skin:     General: Skin is warm and dry.      Capillary Refill: Capillary refill takes less than 2 seconds.   Neurological:      Mental Status: He is alert and oriented to person, place, and time.   Psychiatric:         Behavior: Behavior normal.         Thought Content: Thought content normal.         Judgment: Judgment normal.

## 2025-02-12 DIAGNOSIS — I10 PRIMARY HYPERTENSION: ICD-10-CM

## 2025-02-12 DIAGNOSIS — I10 ESSENTIAL HYPERTENSION: ICD-10-CM

## 2025-02-12 RX ORDER — VALSARTAN AND HYDROCHLOROTHIAZIDE 320; 25 MG/1; MG/1
1 TABLET, FILM COATED ORAL DAILY
Qty: 90 TABLET | Refills: 1 | Status: SHIPPED | OUTPATIENT
Start: 2025-02-12

## 2025-02-12 RX ORDER — AMLODIPINE BESYLATE 5 MG/1
5 TABLET ORAL DAILY
Qty: 90 TABLET | Refills: 1 | Status: SHIPPED | OUTPATIENT
Start: 2025-02-12

## 2025-02-12 RX ORDER — NEBIVOLOL 5 MG/1
5 TABLET ORAL DAILY
Qty: 90 TABLET | Refills: 1 | Status: SHIPPED | OUTPATIENT
Start: 2025-02-12

## 2025-03-17 ENCOUNTER — NURSE TRIAGE (OUTPATIENT)
Age: 45
End: 2025-03-17

## 2025-03-17 NOTE — TELEPHONE ENCOUNTER
"FOLLOW UP: Call patient with recommendations.  He would like to know what else can done for his constipation.  What should he do about Wegovy?       REASON FOR CONVERSATION: Constipation    SYMPTOMS: Constipation, since starting 1.7 mg Wegovy.  He stopped Wegovy 3 weeks ago.  Constipation has continued since then.      OTHER: Patient has tried colace and metamucil.  The only time he is able to completely evacuate his bowel is when he uses a fleets enema.  He has had to use an enema each week, since stopping the Wegovy and will go days in between without a BM.  Denies fever, abdominal pain, nausea or vomiting.    Home care advice given on diet, hydration and OTC remedies.      DISPOSITION: Discuss with Provider and Call Back Patient, Home Care    Reason for Disposition   MILD constipation    Answer Assessment - Initial Assessment Questions  1. STOOL PATTERN OR FREQUENCY: \"How often do you have a bowel movement (BM)?\"  (Normal range: 3 times a day to every 3 days)  \"When was your last BM?\"        With the help of a fleets, he had a BM today   2. STRAINING: \"Do you have to strain to have a BM?\"       Yes   3. ONSET: \"When did the constipation begin?\"      2 weeks ago   4. RECTAL PAIN: \"Does your rectum hurt when the stool comes out?\" If Yes, ask: \"Do you have hemorrhoids? How bad is the pain?\"  (Scale 1-10; or mild, moderate, severe)      Before BM   5. BM COMPOSITION: \"Are the stools hard?\"       Solid   6. BLOOD ON STOOLS: \"Has there been any blood on the toilet tissue or on the surface of the BM?\" If Yes, ask: \"When was the last time?\"      Denies   7. CHRONIC CONSTIPATION: \"Is this a new problem for you?\"  If No, ask: \"How long have you had this problem?\" (days, weeks, months)       Only while the Wegovy   8. CHANGES IN DIET OR HYDRATION: \"Have there been any recent changes in your diet?\" \"How much fluids are you drinking on a daily basis?\"  \"How much have you had to drink today?\"      No   9. MEDICINES: \"Have you " "been taking any new medicines?\" \"Are you taking any narcotic pain medicines?\" (e.g., Dilaudid, morphine, Percocet, Vicodin)      Wegovy   10. LAXATIVES: \"Have you been using any stool softeners, laxatives, or enemas?\"  If Yes, ask \"What, how often, and when was the last time?\"        Metamucil, colace, fleets enema  11. ACTIVITY:  \"How much walking do you do every day?\"  \"Has your activity level decreased in the past week?\"         No   12. CAUSE: \"What do you think is causing the constipation?\"         Wegovy   13. MEDICAL HISTORY: \"Do you have a history of hemorrhoids, rectal fissures, rectal surgery, or rectal abscess?\"          Denies   14. OTHER SYMPTOMS: \"Do you have any other symptoms?\" (e.g., abdomen pain, bloating, fever, vomiting)       During BM    Protocols used: Constipation-Adult-[place]    "

## 2025-03-17 NOTE — TELEPHONE ENCOUNTER
Regarding: severe constipation possible SI to Wegovy  ----- Message from Vickie HSU sent at 3/17/2025  1:06 PM EDT -----  Zion Machado,     For the past 4 weeks on the 1.7 dose I've had extreme constipation where the only solution is taking Fleet. I've tried collace but it doesn't seem to work. It last 3-4 days for 3 of the last 4 weeks. I've stopped Wegovy for the last 2 weeks and still have the issue.      Would you recommend stopping altogether? Did not have issue at the 1.0 level. It seems like my bowel movements just stop for a few days.      Dudley

## 2025-03-22 ENCOUNTER — HOSPITAL ENCOUNTER (OUTPATIENT)
Dept: VASCULAR ULTRASOUND | Facility: HOSPITAL | Age: 45
Discharge: HOME | End: 2025-03-22
Payer: COMMERCIAL

## 2025-03-22 DIAGNOSIS — M79.672 PAIN IN BOTH FEET: ICD-10-CM

## 2025-03-22 DIAGNOSIS — L81.9 DISCOLORATION OF SKIN OF TOE: ICD-10-CM

## 2025-03-22 DIAGNOSIS — M79.671 PAIN IN BOTH FEET: ICD-10-CM

## 2025-03-22 PROCEDURE — 93923 UPR/LXTR ART STDY 3+ LVLS: CPT | Performed by: SURGERY

## 2025-03-22 PROCEDURE — 93923 UPR/LXTR ART STDY 3+ LVLS: CPT

## 2025-03-24 ENCOUNTER — RESULTS FOLLOW-UP (OUTPATIENT)
Dept: FAMILY MEDICINE CLINIC | Facility: CLINIC | Age: 45
End: 2025-03-24

## 2025-05-05 ENCOUNTER — CONSULT (OUTPATIENT)
Dept: DERMATOLOGY | Facility: CLINIC | Age: 45
End: 2025-05-05
Payer: COMMERCIAL

## 2025-05-05 VITALS — BODY MASS INDEX: 43.82 KG/M2 | WEIGHT: 313 LBS | HEIGHT: 71 IN | TEMPERATURE: 98.4 F

## 2025-05-05 DIAGNOSIS — L73.9 FOLLICULITIS: ICD-10-CM

## 2025-05-05 DIAGNOSIS — L72.0 EPIDERMAL INCLUSION CYST: ICD-10-CM

## 2025-05-05 DIAGNOSIS — L98.9 SKIN LESION: ICD-10-CM

## 2025-05-05 DIAGNOSIS — L40.9 PSORIASIS: Primary | ICD-10-CM

## 2025-05-05 DIAGNOSIS — L91.0 KELOID SCAR: ICD-10-CM

## 2025-05-05 PROCEDURE — 99204 OFFICE O/P NEW MOD 45 MIN: CPT | Performed by: NURSE PRACTITIONER

## 2025-05-05 RX ORDER — CLINDAMYCIN PHOSPHATE 10 UG/ML
LOTION TOPICAL
Qty: 60 ML | Refills: 6 | Status: SHIPPED | OUTPATIENT
Start: 2025-05-05

## 2025-05-05 RX ORDER — CLOBETASOL PROPIONATE 0.5 MG/G
CREAM TOPICAL
Qty: 60 G | Refills: 1 | Status: SHIPPED | OUTPATIENT
Start: 2025-05-05

## 2025-05-05 NOTE — PROGRESS NOTES
"[facility] Note     Patient Name: Dudley Madsen  Encounter Date: 5/5/2025       Have you been cared for by a [facility] D[facility]atologist in the last 3 years and, if so, which description applies to you? NO. I am considered a \"new\" patient and must complete all patient intake questions. I am not of child-bearing potential.     REVIEW OF SYSTEMS:  Have you recently had or currently have any of the following? Recent fever or chills? No  Any non-healing wound? No   PAST MEDICAL HISTORY:  Have you personally ever had or currently have any of the following?  If \"YES,\" then please provide more detail. Skin cancer (such as Melanoma, Basal Cell Carcinoma, Squamous Cell Carcinoma?  No  Tuberculosis, HIV/AIDS, Hepatitis B or C: No  Radiation Treatment No   HISTORY OF IMMUNOSUPPRESSION:   Do you have a history of any of the following:  Systemic Immunosuppression such as Diabetes, Biologic or Immunotherapy, Chemotherapy, Organ Transplantation, Bone Marrow Transplantation or Prednisone?  No     Answering \"YES\" requires the addition of the dotphrase \"IMMUNOSUPPRESSED\" as the first diagnosis of the patient's visit.   FAMILY HISTORY:  Any \"first degree relatives\" (parent, brother, sister, or child) with the following?    Skin Cancer, Pancreatic or Other Cancer? No   PATIENT EXPERIENCE:    Do you want the Dermatologist to perform a COMPLETE skin exam today including a clinical examination under the \"bra and underwear\" areas?  NO  If necessary, do we have your permission to call and leave a detailed message on your Preferred Phone number that includes your specific medical information?  Yes      Allergies   Allergen Reactions    Ace Inhibitors Cough      Current Outpatient Medications:     amLODIPine (NORVASC) 5 mg tablet, TAKE 1 TABLET (5 MG TOTAL) BY MOUTH DAILY., Disp: 90 tablet, Rfl: 1    famotidine (PEPCID) 20 mg tablet, Take 1 tablet (20 mg total) by mouth 2 (two) times a day as needed for heartburn, Disp: 180 " tablet, Rfl: 3    nebivolol (BYSTOLIC) 5 mg tablet, TAKE 1 TABLET (5 MG TOTAL) BY MOUTH DAILY., Disp: 90 tablet, Rfl: 1    omeprazole (PriLOSEC) 40 MG capsule, TAKE 1 CAPSULE BY MOUTH TWICE A DAY, Disp: 180 capsule, Rfl: 1    valsartan-hydrochlorothiazide (DIOVAN-HCT) 320-25 MG per tablet, TAKE 1 TABLET BY MOUTH EVERY DAY, Disp: 90 tablet, Rfl: 1          Whom besides the patient is providing clinical information about today's encounter?   NO ADDITIONAL HISTORIAN (patient alone provided history)    Physical Exam and Assessment/Plan by Diagnosis: Patient presents in consultation.  Denies any personal or family history of skin cancer.      PSORIASIS    Physical Exam:  Anatomic Location:  bilateral elbows  Morphologic Description:  Pink patches with silvery scales   Pustules present? No  Severity: mild  Body Percent Affected: 3%  Pertinent Positives:  Itching? YES  Nail dystrophy (pitting, onycholysis, and/or hyperkeratosis)? No  Dactylitis?  No  Pertinent Negatives:    Additional History of Present Condition:  Patient presents in consultation.  Notes 3-4 year history of dry patches on both elbows which has gotten worse.  Notes some itch.  Denies plaques anywhere else.  No joint pain or swelling.  Uses topicals creams/oils.    Family history of psoriasis?  No  Recent infection (strep throat)? No  Psoriatic Arthritis (PEST) Screen:   Have you ever had a swollen joint or joints?  No  Has your doctor ever told you that you have arthritis?  No  Do your fingernails or toenails have holes or pits?    No  Have you had pain in your heel?  No  Have you ever had a finger or toe that was painful or swollen for no apparent reason?  No  Rheumatoid factor NEGATIVITY?  No  Personal history of dactylitis?  No  IMAGING STUDIES:  Juxta-articular new bone formation?  No    Plan:  Discussed chronic nature of disease. Psoriasis tends to persist lifelong and fluctuates in extent and severity. To help manage the disease, decrease factors  that aggravate psoriasis. This includes treating streptococcal infections, minimizing skin injuries, avoiding sun exposure if it exacerbates psoriasis, avoiding smoking and alcohol usage, decreasing stress, and maintaining an optimal body weight. Certain medications such as lithium, beta blockers, antimalarials, and NSAIDs have also been implicated. Suddenly stopping oral steroids or strong topical steroids can cause rebound disease.   Dry skin is more susceptible to outbreaks of psoriasis. Practice moisturizing throughout the day and after bathing or showering to reduce itching, dryness and scaling.  Topical Therapy: Clobetasol 0.05% cream: Apply a thin layer to affected areas twice daily. Do not apply to face, armpits, or groin. Make sure to give your skin breaks to avoid skin thinning and stretch marks. Apply for two weeks with a one week break in between, or apply for five days with a two day break in between.  Follow up in 6 months for re-evaluation  Advised to call sooner with any questions/concerns      Medical Complexity:    UNDIAGNOSED NEW PROBLEM WITH UNCERTAIN DIAGNOSIS.  A condition included in the differential diagnosis represents a high risk of morbidity without treatment.        EPIDERMAL INCLUSION CYST    Physical Exam:  Anatomic Location Affected:  left axilla   Morphological Description:  2 cm nodule   Pertinent Positives:  Pertinent Negatives:    Additional History of Present Condition:  Patient mentions that there is a SOC under the left underarm. Patient mentions size has stayed the same for the last few years.  States it had drained once in the past.  Denies any pain, bleeding or symptoms of acute infection.    Assessment and Plan:  Based on a thorough discussion of this condition and the management approach to it (including a comprehensive discussion of the known risks, side effects and potential benefits of treatment), the patient (family) agrees to implement the following specific  plan:  Reassured benign   Monitor for changes   Discussed removal of cyst by excision, but defers at this time  Reviewed symptoms of acute infection    DERMATOFIBROMA    Physical Exam:  Anatomic Location Affected:  right upper and lower extremities   Morphological Description:  hyperpigmented papule  Pertinent Positives: +dimple  Pertinent Negatives:    Additional History of Present Condition:  Patient mentions he has had some darker discoloration on the upper and lower extremities, Patient mentions spots are not bothersome.  Appeared a few years ago, no changes.     Assessment and Plan:  Based on a thorough discussion of this condition and the management approach to it (including a comprehensive discussion of the known risks, side effects and potential benefits of treatment), the patient (family) agrees to implement the following specific plan:  Reassured benign  Monitor spots for changes     KELOID SCAR    Physical Exam:  Anatomic Location Affected:  Mid upper chest   Morphological Description:  skin colored, linear nodule  Pertinent Positives:  Pertinent Negatives:    Additional History of Present Condition:  Patient mentions a small scar that appeared on the mid upper chest. Patient mentions it is not bothersome. Denies any pain or bleeding from spot     Assessment and Plan:  Based on a thorough discussion of this condition and the management approach to it (including a comprehensive discussion of the known risks, side effects and potential benefits of treatment), the patient (family) agrees to implement the following specific plan:  Reassured benign  Discussed intralesional kenalog injection, but defers at this time    FOLLICULITIS    Physical Exam:  Anatomic Location Affected:  Chin, neck, face  Morphological Description:  pink papules/pustules  Pertinent Positives:  Pertinent Negatives:    Additional History of Present Condition:  Patient trims beard.  Reports occasional ingrown hairs and bumps.       Assessment and Plan:  Based on a thorough discussion of this condition and the management approach to it (including a comprehensive discussion of the known risks, side effects and potential benefits of treatment), the patient (family) agrees to implement the following specific plan:  Apply clindamycin lotion in the morning while washing face.    Avoid shaving, trim beard    Scribe Attestation      I,:  Denis De La Rosa am acting as a scribe while in the presence of the attending physician.:       I,:  SULMA See personally performed the services described in this documentation    as scribed in my presence.:

## 2025-05-12 ENCOUNTER — OFFICE VISIT (OUTPATIENT)
Dept: FAMILY MEDICINE CLINIC | Facility: CLINIC | Age: 45
End: 2025-05-12
Payer: COMMERCIAL

## 2025-05-12 VITALS
RESPIRATION RATE: 18 BRPM | HEIGHT: 71 IN | TEMPERATURE: 96.5 F | HEART RATE: 83 BPM | BODY MASS INDEX: 43.57 KG/M2 | SYSTOLIC BLOOD PRESSURE: 124 MMHG | DIASTOLIC BLOOD PRESSURE: 86 MMHG | WEIGHT: 311.2 LBS | OXYGEN SATURATION: 100 %

## 2025-05-12 DIAGNOSIS — Z00.00 ANNUAL PHYSICAL EXAM: Primary | ICD-10-CM

## 2025-05-12 DIAGNOSIS — I10 PRIMARY HYPERTENSION: ICD-10-CM

## 2025-05-12 DIAGNOSIS — E66.813 CLASS 3 SEVERE OBESITY IN ADULT: ICD-10-CM

## 2025-05-12 DIAGNOSIS — N50.89 TESTICULAR LUMP: ICD-10-CM

## 2025-05-12 PROCEDURE — 99396 PREV VISIT EST AGE 40-64: CPT | Performed by: FAMILY MEDICINE

## 2025-05-12 NOTE — PROGRESS NOTES
Adult Annual Physical  Name: Dudley Madsen      : 1980      MRN: [id number]  Encounter Provider: Amarilis Machado DO  Encounter Date: 2025   Encounter department: [facility]    :  Assessment & Plan  Annual physical exam  Needs to get labs down  Left bump on testicle       Class 3 severe obesity in adult    Stopped wegovy due to severe constipation  No injection for 2 months  Still down in weight  Discussed switch to zepbound         Primary hypertension  Stable on current med       Testicular lump    Orders:  •  US scrotum and testicles; Future      Assessment & Plan        Preventive Screenings:    - Hepatitis C screening: screening up-to-date   - HIV screening: screening up-to-date   - Colon cancer screening: screening up-to-date   - Lung cancer screening: screening not indicated   - Prostate cancer screening: screening not indicated     Immunizations:  - Immunizations due: Prevnar 20      Depression Screening and Follow-up Plan: Patient was screened for depression during today's encounter. They screened negative with a PHQ-2 score of 0.      Tobacco Cessation Counseling: The patient is sincerely urged to quit consumption of tobacco. He is not ready to quit tobacco.         History of Present Illness     Adult Annual Physical:  Patient presents for annual physical. Here for wellness  Doing ok until moved up to the 1mg -severe constipation  Taking colace  .     Diet and Physical Activity:  - Diet/Nutrition: low calorie diet.  - Exercise: no formal exercise.    Depression Screening:  - PHQ-2 Score: 0    General Health:  - Sleep: uses CPAP machine.  - Hearing: normal hearing right ear and normal hearing left ear.  - Vision: vision problems and wears contacts.  - Dental: regular dental visits.    /GYN Health:  - Follows with GYN: no.   - History of STDs: no     Health:  - History of STDs: no.   - Urinary symptoms: post-void dribbling.     Advanced Care Planning:  - Has an advanced  "directive?: no    - Has a durable medical POA?: no      Review of Systems   Constitutional: Negative.    HENT: Negative.     Eyes: Negative.    Respiratory: Negative.     Cardiovascular: Negative.    Gastrointestinal: Negative.    Endocrine: Negative.    Genitourinary: Negative.    Musculoskeletal: Negative.    Skin: Negative.    Allergic/Immunologic: Negative.    Neurological: Negative.    Hematological: Negative.    Psychiatric/Behavioral: Negative.       Medical History Reviewed by provider this encounter:  Tobacco  Allergies  Meds  Problems  Med Hx  Surg Hx  Fam Hx     .    Objective   /86 (BP Location: Left arm, Patient Position: Sitting, Cuff Size: Large)   Pulse 83   Temp (!) 96.5 °F (35.8 °C) (Tympanic)   Resp 18   Ht 5' 11.02\" (1.804 m)   Wt (!) 141 kg (311 lb 3.2 oz)   SpO2 100%   BMI 43.37 kg/m²     Physical Exam  Vitals and nursing note reviewed.   Constitutional:       Appearance: Normal appearance. He is well-developed.   HENT:      Head: Normocephalic and atraumatic.      Right Ear: External ear normal.      Left Ear: External ear normal.      Nose: Nose normal.   Eyes:      Extraocular Movements: Extraocular movements intact.      Conjunctiva/sclera: Conjunctivae normal.      Pupils: Pupils are equal, round, and reactive to light.   Cardiovascular:      Rate and Rhythm: Normal rate and regular rhythm.      Heart sounds: Normal heart sounds.   Pulmonary:      Effort: Pulmonary effort is normal.      Breath sounds: Normal breath sounds.   Abdominal:      General: Abdomen is flat. Bowel sounds are normal.      Palpations: Abdomen is soft.   Musculoskeletal:         General: Normal range of motion.      Cervical back: Normal range of motion and neck supple.   Skin:     General: Skin is warm and dry.      Capillary Refill: Capillary refill takes less than 2 seconds.   Neurological:      General: No focal deficit present.      Mental Status: He is alert and oriented to person, place, " and time.   Psychiatric:         Mood and Affect: Mood normal.         Behavior: Behavior normal.         Thought Content: Thought content normal.         Judgment: Judgment normal.

## 2025-05-12 NOTE — ASSESSMENT & PLAN NOTE
Stopped wegovy due to severe constipation  No injection for 2 months  Still down in weight  Discussed switch to zepbound

## 2025-05-12 NOTE — PATIENT INSTRUCTIONS
"Patient Education     Routine physical for adults   The Basics   Written by the doctors and editors at [facility]   What is a physical? -- A physical is a routine visit, or \"check-up,\" with your doctor. You might also hear it called a \"wellness visit\" or \"preventive visit.\"  During each visit, the doctor will:   Ask about your physical and mental health   Ask about your habits, behaviors, and lifestyle   Do an exam   Give you vaccines if needed   Talk to you about any medicines you take   Give advice about your health   Answer your questions  Getting regular check-ups is an important part of taking care of your health. It can help your doctor find and treat any problems you have. But it's also important for preventing health problems.  A routine physical is different from a \"sick visit.\" A sick visit is when you see a doctor because of a health concern or problem. Since physicals are scheduled ahead of time, you can think about what you want to ask the doctor.  How often should I get a physical? -- It depends on your age and health. In general, for people age 21 years and older:   If you are younger than 50 years, you might be able to get a physical every 3 years.   If you are 50 years or older, your doctor might recommend a physical every year.  If you have an ongoing health condition, like diabetes or high blood pressure, your doctor will probably want to see you more often.  What happens during a physical? -- In general, each visit will include:   Physical exam - The doctor or nurse will check your height, weight, heart rate, and blood pressure. They will also look at your eyes and ears. They will ask about how you are feeling and whether you have any symptoms that bother you.   Medicines - It's a good idea to bring a list of all the medicines you take to each doctor visit. Your doctor will talk to you about your medicines and answer any questions. Tell them if you are having any side effects that bother you. You " "should also tell them if you are having trouble paying for any of your medicines.   Habits and behaviors - This includes:   Your diet   Your exercise habits   Whether you smoke, drink alcohol, or use drugs   Whether you are sexually active   Whether you feel safe at home  Your doctor will talk to you about things you can do to improve your health and lower your risk of health problems. They will also offer help and support. For example, if you want to quit smoking, they can give you advice and might prescribe medicines. If you want to improve your diet or get more physical activity, they can help you with this, too.   Lab tests, if needed - The tests you get will depend on your age and situation. For example, your doctor might want to check your:   Cholesterol   Blood sugar   Iron level   Vaccines - The recommended vaccines will depend on your age, health, and what vaccines you already had. Vaccines are very important because they can prevent certain serious or deadly infections.   Discussion of screening - \"Screening\" means checking for diseases or other health problems before they cause symptoms. Your doctor can recommend screening based on your age, risk, and preferences. This might include tests to check for:   Cancer, such as breast, prostate, cervical, ovarian, colorectal, prostate, lung, or skin cancer   Sexually transmitted infections, such as chlamydia and gonorrhea   Mental health conditions like depression and anxiety  Your doctor will talk to you about the different types of screening tests. They can help you decide which screenings to have. They can also explain what the results might mean.   Answering questions - The physical is a good time to ask the doctor or nurse questions about your health. If needed, they can refer you to other doctors or specialists, too.  Adults older than 65 years often need other care, too. As you get older, your doctor will talk to you about:   How to prevent falling at " home   Hearing or vision tests   Memory testing   How to take your medicines safely   Making sure that you have the help and support you need at home  All topics are updated as new evidence becomes available and our peer review process is complete.  This topic retrieved from [employer] on: May 02, 2024.  Topic 435629 Version 1.0  Release: 32.4.3 - C32.122  © 2024 UpToDate, Inc. and/or its affiliates. All rights reserved.  Consumer Information Use and Disclaimer   Disclaimer: This generalized information is a limited summary of diagnosis, treatment, and/or medication information. It is not meant to be comprehensive and should be used as a tool to help the user understand and/or assess potential diagnostic and treatment options. It does NOT include all information about conditions, treatments, medications, side effects, or risks that may apply to a specific patient. It is not intended to be medical advice or a substitute for the medical advice, diagnosis, or treatment of a health care provider based on the health care provider's examination and assessment of a patient's specific and unique circumstances. Patients must speak with a health care provider for complete information about their health, medical questions, and treatment options, including any risks or benefits regarding use of medications. This information does not endorse any treatments or medications as safe, effective, or approved for treating a specific patient. UpToDate, Inc. and its affiliates disclaim any warranty or liability relating to this information or the use thereof.The use of this information is governed by the Terms of Use, available at https://www.[employer].com/en/know/clinical-effectiveness-terms. 2024© UpToDate, Inc. and its affiliates and/or licensors. All rights reserved.  Copyright   © 2024 UpToDate, Inc. and/or its affiliates. All rights reserved.

## 2025-05-16 DIAGNOSIS — K21.9 GERD WITHOUT ESOPHAGITIS: ICD-10-CM

## 2025-05-16 RX ORDER — OMEPRAZOLE 40 MG/1
40 CAPSULE, DELAYED RELEASE ORAL 2 TIMES DAILY
Qty: 180 CAPSULE | Refills: 1 | Status: SHIPPED | OUTPATIENT
Start: 2025-05-16

## 2025-06-04 ENCOUNTER — OFFICE VISIT (OUTPATIENT)
Dept: SLEEP CENTER | Facility: CLINIC | Age: 45
End: 2025-06-04
Payer: COMMERCIAL

## 2025-06-04 VITALS
BODY MASS INDEX: 43.82 KG/M2 | SYSTOLIC BLOOD PRESSURE: 140 MMHG | HEIGHT: 71 IN | WEIGHT: 313 LBS | DIASTOLIC BLOOD PRESSURE: 80 MMHG

## 2025-06-04 DIAGNOSIS — K21.9 GERD WITHOUT ESOPHAGITIS: ICD-10-CM

## 2025-06-04 DIAGNOSIS — G47.33 OBSTRUCTIVE SLEEP APNEA: Primary | ICD-10-CM

## 2025-06-04 DIAGNOSIS — I10 ESSENTIAL HYPERTENSION: ICD-10-CM

## 2025-06-04 DIAGNOSIS — E66.01 MORBID OBESITY WITH BMI OF 40.0-44.9, ADULT (HCC): ICD-10-CM

## 2025-06-04 DIAGNOSIS — F51.12 INSUFFICIENT SLEEP SYNDROME: ICD-10-CM

## 2025-06-04 PROCEDURE — 99214 OFFICE O/P EST MOD 30 MIN: CPT | Performed by: INTERNAL MEDICINE
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
## 2025-06-09 ENCOUNTER — OFFICE VISIT (OUTPATIENT)
Dept: GASTROENTEROLOGY | Facility: AMBULARY SURGERY CENTER | Age: 45
End: 2025-06-09
Payer: COMMERCIAL

## 2025-06-09 VITALS
SYSTOLIC BLOOD PRESSURE: 136 MMHG | HEART RATE: 60 BPM | OXYGEN SATURATION: 97 % | BODY MASS INDEX: 44.1 KG/M2 | DIASTOLIC BLOOD PRESSURE: 76 MMHG | HEIGHT: 71 IN | WEIGHT: 315 LBS

## 2025-06-09 DIAGNOSIS — K21.00 GASTROESOPHAGEAL REFLUX DISEASE WITH ESOPHAGITIS WITHOUT HEMORRHAGE: Primary | ICD-10-CM

## 2025-06-09 DIAGNOSIS — E66.01 MORBID OBESITY WITH BMI OF 40.0-44.9, ADULT (HCC): ICD-10-CM

## 2025-06-09 DIAGNOSIS — K44.9 HIATAL HERNIA: ICD-10-CM

## 2025-06-09 PROCEDURE — 99214 OFFICE O/P EST MOD 30 MIN: CPT | Performed by: INTERNAL MEDICINE

## 2025-06-09 RX ORDER — FAMOTIDINE 20 MG/1
20 TABLET, FILM COATED ORAL 2 TIMES DAILY PRN
Qty: 180 TABLET | Refills: 3 | Status: SHIPPED | OUTPATIENT
Start: 2025-06-09

## 2025-06-09 RX ORDER — OMEPRAZOLE 40 MG/1
40 CAPSULE, DELAYED RELEASE ORAL 2 TIMES DAILY
Qty: 180 CAPSULE | Refills: 3 | Status: SHIPPED | OUTPATIENT
Start: 2025-06-09

## 2025-06-09 NOTE — ASSESSMENT & PLAN NOTE
He is following with bariatric surgery, last visit April 2024  Disease complicated by obstructive sleep apnea  defers bariatric surgery at this time- but discussing bariatric surgery + TIF procedure as alternative for antireflux procedure  Intolerable side effects with wegovy (constipation)  Considering zepbound  Recommend additional miralax if restarting GLP-1  Orders:    CBC; Future    Comprehensive metabolic panel; Future

## 2025-06-09 NOTE — PROGRESS NOTES
Name: Dudley Madsen      : 1980      MRN: [id number]  Encounter Provider: Rand Tse MD  Encounter Date: 2025   Encounter department: [facility]  :  Assessment & Plan  Gastroesophageal reflux disease with esophagitis without hemorrhage  Hiatal hernia    Chronic symptoms > 12 months, refractory to medical therapy  Continues BID Prilosec- continues to have acid reflux symptoms that affect QOL  Objectively healing noted on EGD 2024  + pH study  Emphasized weight loss and surgical options to treat symptoms -Recommend f/u with bariatric surgery, advanced endoscopist ?TIF and weight loss management- also considering GLP-1 agonist  - start scheduled pepcid AC lunch, before dinner  Orders:    famotidine (PEPCID) 20 mg tablet; Take 1 tablet (20 mg total) by mouth 2 (two) times a day as needed for heartburn    omeprazole (PriLOSEC) 40 MG capsule; Take 1 capsule (40 mg total) by mouth in the morning and 1 capsule (40 mg total) before bedtime.    Morbid obesity with BMI of 40.0-44.9, adult (HCC)  He is following with bariatric surgery, last visit 2024  Disease complicated by obstructive sleep apnea  defers bariatric surgery at this time- but discussing bariatric surgery + TIF procedure as alternative for antireflux procedure  Intolerable side effects with wegovy (constipation)  Considering zepbound  Recommend additional miralax if restarting GLP-1  Orders:    CBC; Future    Comprehensive metabolic panel; Future      Assessment & Plan        History of Present Illness   Dudley Madsen is a 44 y.o. male who presents in office visit follow-up for GERD with esophagitis.     He continues on omeprazole 40 mg twice daily, before breakfast, before dinner.  He is taking Pepcid 20 mg approximately 3-4 times a week.  He continues to experience severe, bothersome episodes of acid reflux, episodes occur 2-3 times per hour.     He did try Wegovy, and on transition to 0.5 mg  "to 1 mg he started to experience severe constipation, with bowel movements occurring only once per week.  He had therapeutic failure of over-the-counter stool softeners, and started to use enemas.  This prompted discontinuation of the medication.  History of Present Illness      Review of Systems A complete review of systems is negative other than that noted above in the HPI.      Current Medications[1]  Objective   /76 (BP Location: Left arm, Patient Position: Sitting, Cuff Size: Large)   Pulse 60   Ht 5' 11\" (1.803 m)   Wt (!) 143 kg (315 lb 12.8 oz)   SpO2 97%   BMI 44.05 kg/m²     Physical Exam  Constitutional:       General: He is not in acute distress.     Appearance: Normal appearance.   HENT:      Head: Normocephalic and atraumatic.     Eyes:      General: No scleral icterus.    Pulmonary:      Effort: Pulmonary effort is normal. No respiratory distress.   Abdominal:      General: Bowel sounds are normal. There is no distension.      Palpations: Abdomen is soft.     Skin:     Coloration: Skin is not jaundiced.     Neurological:      Mental Status: He is alert and oriented to person, place, and time.     Psychiatric:         Behavior: Behavior normal.        Physical Exam      Results    Lab Results: None recent      Results for orders placed during the hospital encounter of 08/12/24    EGD    Impression  The GE junction appeared normal. Performed random biopsy.  Small type I hiatal hernia  The body of the stomach and antrum appeared normal. Performed random biopsy.  Polyp in the fundus of the stomach was removed with cold forceps biopsy  The duodenal bulb and 2nd part of the duodenum appeared normal.      RECOMMENDATION:  Await pathology results  Proceed with colonoscopy    Can decrease to omeprazole/prilosec once daily before breakfast  Continue antireflux measures        Rand Tse MD             [1]   Current Outpatient Medications   Medication Sig Dispense Refill    amLODIPine (NORVASC) 5 " mg tablet TAKE 1 TABLET (5 MG TOTAL) BY MOUTH DAILY. 90 tablet 1    clindamycin (CLEOCIN T) 1 % lotion Apply in the morning while washing face 60 mL 6    clobetasol (TEMOVATE) 0.05 % cream Apply once a day for up to two weeks then a week break, then as needed for flares.  Avoid face, groin, armpits 60 g 1    famotidine (PEPCID) 20 mg tablet Take 1 tablet (20 mg total) by mouth 2 (two) times a day as needed for heartburn 180 tablet 3    nebivolol (BYSTOLIC) 5 mg tablet TAKE 1 TABLET (5 MG TOTAL) BY MOUTH DAILY. 90 tablet 1    omeprazole (PriLOSEC) 40 MG capsule Take 1 capsule (40 mg total) by mouth in the morning and 1 capsule (40 mg total) before bedtime. 180 capsule 3    valsartan-hydrochlorothiazide (DIOVAN-HCT) 320-25 MG per tablet TAKE 1 TABLET BY MOUTH EVERY DAY 90 tablet 1     No current facility-administered medications for this visit.

## 2025-06-09 NOTE — PATIENT INSTRUCTIONS
I recommend over-the-counter miralax (polyethylene glycol) for treatment of your CONSTIPATION.    Start 1 capful daily. Take this dose x 3 days. If your symptoms are improved, great! Continue this dose daily.    If you have diarrhea (stools are loose, associated with accidents, or urgency) with this dose, cut down to 1/2 capful daily. Continue to titrate down until you find the dose for you. This might be 1 tbsp daily. Wait 3 days before making a change.    If you have continued constipation with 1 capful daily, you may need a higher dose. Start with 1.5 capfuls daily and titrate upward. Eg might need 1 capful twice daily. There is no maximum dose, whatever works for you. Again, wait 3 days before making a change.      - pepcid - try taking as needed before lunch, before bedtime

## 2025-06-09 NOTE — ASSESSMENT & PLAN NOTE
Chronic symptoms > 12 months, refractory to medical therapy  Continues BID Prilosec- continues to have acid reflux symptoms that affect QOL  Objectively healing noted on EGD August 2024  + pH study  Emphasized weight loss and surgical options to treat symptoms -Recommend f/u with bariatric surgery, advanced endoscopist ?TIF and weight loss management- also considering GLP-1 agonist  - start scheduled pepcid AC lunch, before dinner  Orders:    famotidine (PEPCID) 20 mg tablet; Take 1 tablet (20 mg total) by mouth 2 (two) times a day as needed for heartburn    omeprazole (PriLOSEC) 40 MG capsule; Take 1 capsule (40 mg total) by mouth in the morning and 1 capsule (40 mg total) before bedtime.

## 2025-07-18 ENCOUNTER — HOSPITAL ENCOUNTER (OUTPATIENT)
Dept: ULTRASOUND IMAGING | Facility: HOSPITAL | Age: 45
Discharge: HOME | End: 2025-07-18
Payer: COMMERCIAL

## 2025-07-18 DIAGNOSIS — N50.89 TESTICULAR LUMP: ICD-10-CM

## 2025-07-18 PROCEDURE — 76870 US EXAM SCROTUM: CPT

## (undated) DEVICE — INTENDED FOR TISSUE SEPARATION, AND OTHER PROCEDURES THAT REQUIRE A SHARP SURGICAL BLADE TO PUNCTURE OR CUT.: Brand: BARD-PARKER SAFETY BLADES SIZE 11, STERILE

## (undated) DEVICE — BETHLEHEM UNIVERSAL OUTPATIENT: Brand: CARDINAL HEALTH

## (undated) DEVICE — TOWEL SURG XR DETECT GREEN STRL RFD

## (undated) DEVICE — TISSUE RETRIEVAL SYSTEM: Brand: INZII RETRIEVAL SYSTEM

## (undated) DEVICE — INTENDED FOR TISSUE SEPARATION, AND OTHER PROCEDURES THAT REQUIRE A SHARP SURGICAL BLADE TO PUNCTURE OR CUT.: Brand: BARD-PARKER SAFETY BLADES SIZE 15, STERILE

## (undated) DEVICE — SUT MONOCRYL 4-0 PS-2 27 IN Y426H

## (undated) DEVICE — PACK PBDS LAP CHOLE RF

## (undated) DEVICE — TROCAR: Brand: KII FIOS FIRST ENTRY

## (undated) DEVICE — PENCIL ELECTROSURG E-Z CLEAN -0035H

## (undated) DEVICE — LIGAMAX 5 MM ENDOSCOPIC MULTIPLE CLIP APPLIER: Brand: LIGAMAX

## (undated) DEVICE — PREMIUM DRY TRAY LF: Brand: MEDLINE INDUSTRIES, INC.

## (undated) DEVICE — ADHESIVE SKIN HIGH VISCOSITY EXOFIN 1ML

## (undated) DEVICE — CHLORHEXIDINE 4PCT 4 OZ

## (undated) DEVICE — MINOR PROCEDURE DRAPE: Brand: CONVERTORS

## (undated) DEVICE — SUT VICRYL 0 UR-6 27 IN J603H

## (undated) DEVICE — PAD GROUNDING ADULT

## (undated) DEVICE — NEEDLE 22 G X 1 1/2 SAFETY

## (undated) DEVICE — DECANTER: Brand: UNBRANDED

## (undated) DEVICE — SUT SILK 2-0 TIES 144 IN LA55G

## (undated) DEVICE — NEEDLE 25G X 1 1/2

## (undated) DEVICE — GLOVE SRG BIOGEL ECLIPSE 7

## (undated) DEVICE — HARMONIC 1100 SHEARS, 36CM SHAFT LENGTH: Brand: HARMONIC

## (undated) DEVICE — ADHESIVE SKIN CLOSURE SYS EXOFIN FUSION 22CM

## (undated) DEVICE — GLOVE SRG BIOGEL ORTHOPEDIC 8

## (undated) DEVICE — TROCAR: Brand: KII® SLEEVE